# Patient Record
Sex: MALE | Race: WHITE | Employment: FULL TIME | URBAN - METROPOLITAN AREA
[De-identification: names, ages, dates, MRNs, and addresses within clinical notes are randomized per-mention and may not be internally consistent; named-entity substitution may affect disease eponyms.]

---

## 2017-03-15 ENCOUNTER — HOSPITAL ENCOUNTER (EMERGENCY)
Age: 57
Discharge: HOME | End: 2017-03-15
Attending: EMERGENCY MEDICINE
Payer: COMMERCIAL

## 2017-03-15 VITALS
HEIGHT: 67 IN | BODY MASS INDEX: 30.61 KG/M2 | TEMPERATURE: 98 F | SYSTOLIC BLOOD PRESSURE: 143 MMHG | OXYGEN SATURATION: 95 % | HEART RATE: 60 BPM | RESPIRATION RATE: 16 BRPM | WEIGHT: 195 LBS | DIASTOLIC BLOOD PRESSURE: 92 MMHG

## 2017-03-15 DIAGNOSIS — R25.1 SHAKINESS: Primary | ICD-10-CM

## 2017-03-15 LAB
BACTERIA URNS QL MICRO: 0 /HPF
CASTS URNS QL MICRO: 0 /LPF
EPI CELLS #/AREA URNS HPF: NORMAL /HPF
FLUAV AG NPH QL IA: NEGATIVE
FLUBV AG NPH QL IA: NEGATIVE
RBC #/AREA URNS HPF: NORMAL /HPF
WBC URNS QL MICRO: NORMAL /HPF

## 2017-03-15 PROCEDURE — 81003 URINALYSIS AUTO W/O SCOPE: CPT | Performed by: EMERGENCY MEDICINE

## 2017-03-15 PROCEDURE — 96374 THER/PROPH/DIAG INJ IV PUSH: CPT | Performed by: EMERGENCY MEDICINE

## 2017-03-15 PROCEDURE — 74011250636 HC RX REV CODE- 250/636: Performed by: EMERGENCY MEDICINE

## 2017-03-15 PROCEDURE — 99285 EMERGENCY DEPT VISIT HI MDM: CPT | Performed by: EMERGENCY MEDICINE

## 2017-03-15 PROCEDURE — 96361 HYDRATE IV INFUSION ADD-ON: CPT | Performed by: EMERGENCY MEDICINE

## 2017-03-15 PROCEDURE — 87804 INFLUENZA ASSAY W/OPTIC: CPT | Performed by: EMERGENCY MEDICINE

## 2017-03-15 PROCEDURE — 81015 MICROSCOPIC EXAM OF URINE: CPT | Performed by: EMERGENCY MEDICINE

## 2017-03-15 PROCEDURE — 96375 TX/PRO/DX INJ NEW DRUG ADDON: CPT | Performed by: EMERGENCY MEDICINE

## 2017-03-15 RX ORDER — PROCHLORPERAZINE EDISYLATE 5 MG/ML
5 INJECTION INTRAMUSCULAR; INTRAVENOUS
Status: COMPLETED | OUTPATIENT
Start: 2017-03-15 | End: 2017-03-15

## 2017-03-15 RX ORDER — DIPHENHYDRAMINE HYDROCHLORIDE 50 MG/ML
12.5 INJECTION, SOLUTION INTRAMUSCULAR; INTRAVENOUS
Status: COMPLETED | OUTPATIENT
Start: 2017-03-15 | End: 2017-03-15

## 2017-03-15 RX ADMIN — PROCHLORPERAZINE EDISYLATE 5 MG: 5 INJECTION INTRAMUSCULAR; INTRAVENOUS at 19:34

## 2017-03-15 RX ADMIN — DIPHENHYDRAMINE HYDROCHLORIDE 12.5 MG: 50 INJECTION, SOLUTION INTRAMUSCULAR; INTRAVENOUS at 19:34

## 2017-03-15 RX ADMIN — SODIUM CHLORIDE 1000 ML: 900 INJECTION, SOLUTION INTRAVENOUS at 19:33

## 2017-03-15 NOTE — LETTER
[address] [facility] EMERGENCY DEPT One [address] [address] 
[phone] Work/School Note Date: 3/15/2017 To Whom It May concern: 
 
Rogelio Roberts was seen and treated today in the emergency room by the following provider(s): 
Attending Provider: Dale Petty MD. Troyernestina Betancourtt Special Instructions: Work excuse today and tomorrow Sincerely, 
 
 
 
 
Dale Petty MD

## 2017-03-15 NOTE — ED NOTES
Patient awake, A&O x3. Patient states that since about 2pm today he has been feeling sick & very weak. Presently, patient complains of a burning sensation substernally/epigastric area. Patient does have history of reflux and took home medication with no relief. Patient also states he took himself off his amitriptyline and the \"sharp needle like pain\" on the right side of his chest has stopped in the last week. Patient denies nausea/vomiting. Patient states at points his entire body feels like he is being electrocuted. Also complains of general weakness. States he has been shaking uncontrollably off and on.

## 2017-03-15 NOTE — Clinical Note
Follow for fever or any worsening symptoms. Follow-up with Dr. Chas Reid or return to the emergency room with any worsening.  
Plenty of fluids

## 2017-03-15 NOTE — ED PROVIDER NOTES
HPI Comments: Here with nausea that began after lunch today. Drank Hole 19 AUBREY just in case low blood sugar. He has generalized body aches or associated with this. \"Feels like out of body\". Additionally during this time he has had a sense of some chills though no documented fever. Somewhere between any close contacts are sick. No lower GI symptoms. He last 3 weeks's had some mild urinary symptoms. He has a history of ureteral lithiasis but no obstructive uropathy symptoms at present. Has removed himself from long term amitriptyline use. No substance abuse history. Lots of personal stressors    Patient is a 49 y.o. male presenting with general illness. The history is provided by the patient and a parent. Generalized Body Aches   This is a new problem. The current episode started 6 to 12 hours ago. The problem occurs constantly. The problem has been gradually worsening. Pertinent negatives include no chest pain and no shortness of breath. Nothing aggravates the symptoms. Nothing relieves the symptoms. Past Medical History:   Diagnosis Date    Chest discomfort 11/18/2015    Tightness, pressure     Depression     Dyspnea 11/18/2015    Shortness of breath      GERD (gastroesophageal reflux disease)     controlled with meds     Hypoglycemia     Mixed hyperlipidemia 11/18/2015       Past Surgical History:   Procedure Laterality Date    COLONOSCOPY           Family History:   Problem Relation Age of Onset    Heart Attack Mother 66    High Cholesterol Father        Social History     Social History    Marital status: LEGALLY      Spouse name: N/A    Number of children: N/A    Years of education: N/A     Occupational History    Not on file.      Social History Main Topics    Smoking status: Never Smoker    Smokeless tobacco: Never Used    Alcohol use No    Drug use: No    Sexual activity: Not on file     Other Topics Concern    Not on file     Social History Narrative ALLERGIES: Iohexol and Penicillin g    Review of Systems   Constitutional: Negative for chills and fever. Respiratory: Negative. Negative for shortness of breath. Cardiovascular: Negative for chest pain. Gastrointestinal: Negative. Genitourinary: Negative. Musculoskeletal: Negative. Neurological: Negative. Hematological: Negative. Psychiatric/Behavioral: Positive for dysphoric mood. Negative for agitation, behavioral problems, confusion and decreased concentration. The patient is nervous/anxious. All other systems reviewed and are negative. Vitals:    03/15/17 1600 03/15/17 1733   BP: (!) 129/95    Pulse: 88    Resp: 18    Temp: 97.9 °F (36.6 °C)    SpO2: 98% 98%   Weight: 88.5 kg (195 lb)    Height: 5' 7\" (1.702 m)             Physical Exam   Constitutional: He appears well-developed and well-nourished. No distress. Not toxic or septic   HENT:   Head: Atraumatic. Nose: Nose normal. Right sinus exhibits no maxillary sinus tenderness and no frontal sinus tenderness. Left sinus exhibits no maxillary sinus tenderness and no frontal sinus tenderness. Mouth/Throat: Oropharynx is clear and moist. No oropharyngeal exudate. Eyes: No scleral icterus. Neck: Neck supple. Cardiovascular: Normal rate, regular rhythm and intact distal pulses. Pulmonary/Chest: Effort normal. No respiratory distress. He has no wheezes. Clear all fields   Abdominal: Soft. There is no tenderness. There is no rigidity, no rebound, no guarding and no CVA tenderness. Benign. No CVAT   Musculoskeletal: Normal range of motion. He exhibits no edema or tenderness. Lymphadenopathy:     He has no cervical adenopathy. Neurological: He is alert. He exhibits normal muscle tone. Coordination normal.   Skin: Skin is warm and dry. Psychiatric: His behavior is normal. Thought content normal. His mood appears anxious. His affect is not labile and not inappropriate.  His speech is not rapid and/or pressured. He is not agitated, not aggressive and not actively hallucinating. Nursing note and vitals reviewed. MDM  Number of Diagnoses or Management Options  Diagnosis management comments: No defined cause, could be infectious such as viral and possibly anxiety/stress and with this stopped a med. To follow closely for any changes.        Amount and/or Complexity of Data Reviewed  Clinical lab tests: ordered and reviewed  Decide to obtain previous medical records or to obtain history from someone other than the patient: yes  Obtain history from someone other than the patient: yes (father)    Risk of Complications, Morbidity, and/or Mortality  Presenting problems: moderate  Diagnostic procedures: low  Management options: moderate    Patient Progress  Patient progress: stable    ED Course       Procedures    Recent Results (from the past 12 hour(s))   INFLUENZA A & B AG (RAPID TEST)    Collection Time: 03/15/17  4:05 PM   Result Value Ref Range    Influenza A Ag NEGATIVE  NEG      Influenza B Ag NEGATIVE  NEG     URINE MICROSCOPIC    Collection Time: 03/15/17  7:45 PM   Result Value Ref Range    WBC 0-3 0 /hpf    RBC 0-3 0 /hpf    Epithelial cells 0-3 0 /hpf    Bacteria 0 0 /hpf    Casts 0 0 /lpf

## 2017-03-15 NOTE — ED TRIAGE NOTES
Per ems the pt has body ache and is under a lot of stress. Pt states he ate around 1400hrs today and started getting sick at his stomach. Pt self treated with otc meds. Pt denies vomiting. Pt states he cannot stop shaking and has pressure all over his entire body.

## 2017-04-21 PROBLEM — F41.9 ANXIETY DISORDER: Status: ACTIVE | Noted: 2017-04-21

## 2017-10-22 ENCOUNTER — HOSPITAL ENCOUNTER (EMERGENCY)
Age: 57
Discharge: HOME | End: 2017-10-22
Attending: EMERGENCY MEDICINE
Payer: COMMERCIAL

## 2017-10-22 VITALS
WEIGHT: 195 LBS | BODY MASS INDEX: 30.61 KG/M2 | HEIGHT: 67 IN | OXYGEN SATURATION: 100 % | DIASTOLIC BLOOD PRESSURE: 85 MMHG | SYSTOLIC BLOOD PRESSURE: 141 MMHG | TEMPERATURE: 98 F | HEART RATE: 72 BPM | RESPIRATION RATE: 16 BRPM

## 2017-10-22 DIAGNOSIS — K21.9 GASTROESOPHAGEAL REFLUX DISEASE, ESOPHAGITIS PRESENCE NOT SPECIFIED: Primary | ICD-10-CM

## 2017-10-22 DIAGNOSIS — F41.9 ACUTE ANXIETY: ICD-10-CM

## 2017-10-22 LAB
ALBUMIN SERPL-MCNC: 3.7 G/DL (ref 3.5–5)
ALBUMIN/GLOB SERPL: 1.1 {RATIO} (ref 1.2–3.5)
ALP SERPL-CCNC: 132 U/L (ref 50–136)
ALT SERPL-CCNC: 23 U/L (ref 12–65)
ANION GAP SERPL CALC-SCNC: 9 MMOL/L (ref 7–16)
AST SERPL-CCNC: 15 U/L (ref 15–37)
ATRIAL RATE: 78 BPM
BASOPHILS # BLD: 0 K/UL (ref 0–0.2)
BASOPHILS NFR BLD: 0 % (ref 0–2)
BILIRUB SERPL-MCNC: 0.4 MG/DL (ref 0.2–1.1)
BUN SERPL-MCNC: 15 MG/DL (ref 6–23)
CALCIUM SERPL-MCNC: 8.5 MG/DL (ref 8.3–10.4)
CALCULATED P AXIS, ECG09: 34 DEGREES
CALCULATED R AXIS, ECG10: 14 DEGREES
CALCULATED T AXIS, ECG11: 20 DEGREES
CHLORIDE SERPL-SCNC: 107 MMOL/L (ref 98–107)
CO2 SERPL-SCNC: 26 MMOL/L (ref 21–32)
CREAT SERPL-MCNC: 1.02 MG/DL (ref 0.8–1.5)
DIAGNOSIS, 93000: NORMAL
DIFFERENTIAL METHOD BLD: ABNORMAL
EOSINOPHIL # BLD: 0.1 K/UL (ref 0–0.8)
EOSINOPHIL NFR BLD: 1 % (ref 0.5–7.8)
ERYTHROCYTE [DISTWIDTH] IN BLOOD BY AUTOMATED COUNT: 12.3 % (ref 11.9–14.6)
GLOBULIN SER CALC-MCNC: 3.5 G/DL (ref 2.3–3.5)
GLUCOSE BLD STRIP.AUTO-MCNC: 83 MG/DL (ref 65–100)
GLUCOSE SERPL-MCNC: 105 MG/DL (ref 65–100)
HCT VFR BLD AUTO: 46 % (ref 41.1–50.3)
HGB BLD-MCNC: 16 G/DL (ref 13.6–17.2)
IMM GRANULOCYTES # BLD: 0 K/UL (ref 0–0.5)
IMM GRANULOCYTES NFR BLD: 0 % (ref 0–5)
LYMPHOCYTES # BLD: 1.5 K/UL (ref 0.5–4.6)
LYMPHOCYTES NFR BLD: 22 % (ref 13–44)
MCH RBC QN AUTO: 30.5 PG (ref 26.1–32.9)
MCHC RBC AUTO-ENTMCNC: 34.8 G/DL (ref 31.4–35)
MCV RBC AUTO: 87.8 FL (ref 79.6–97.8)
MONOCYTES # BLD: 0.3 K/UL (ref 0.1–1.3)
MONOCYTES NFR BLD: 5 % (ref 4–12)
NEUTS SEG # BLD: 5 K/UL (ref 1.7–8.2)
NEUTS SEG NFR BLD: 72 % (ref 43–78)
P-R INTERVAL, ECG05: 156 MS
PLATELET # BLD AUTO: 264 K/UL (ref 150–450)
PMV BLD AUTO: 10.3 FL (ref 10.8–14.1)
POTASSIUM SERPL-SCNC: 3.7 MMOL/L (ref 3.5–5.1)
PROT SERPL-MCNC: 7.2 G/DL (ref 6.3–8.2)
Q-T INTERVAL, ECG07: 368 MS
QRS DURATION, ECG06: 78 MS
QTC CALCULATION (BEZET), ECG08: 419 MS
RBC # BLD AUTO: 5.24 M/UL (ref 4.23–5.67)
SODIUM SERPL-SCNC: 142 MMOL/L (ref 136–145)
TROPONIN I SERPL-MCNC: <0.02 NG/ML (ref 0.02–0.05)
VENTRICULAR RATE, ECG03: 78 BPM
WBC # BLD AUTO: 6.9 K/UL (ref 4.3–11.1)

## 2017-10-22 PROCEDURE — 96374 THER/PROPH/DIAG INJ IV PUSH: CPT | Performed by: EMERGENCY MEDICINE

## 2017-10-22 PROCEDURE — 93005 ELECTROCARDIOGRAM TRACING: CPT | Performed by: EMERGENCY MEDICINE

## 2017-10-22 PROCEDURE — 81003 URINALYSIS AUTO W/O SCOPE: CPT | Performed by: EMERGENCY MEDICINE

## 2017-10-22 PROCEDURE — 99285 EMERGENCY DEPT VISIT HI MDM: CPT | Performed by: EMERGENCY MEDICINE

## 2017-10-22 PROCEDURE — 74011250637 HC RX REV CODE- 250/637: Performed by: EMERGENCY MEDICINE

## 2017-10-22 PROCEDURE — 82962 GLUCOSE BLOOD TEST: CPT

## 2017-10-22 PROCEDURE — 80053 COMPREHEN METABOLIC PANEL: CPT | Performed by: EMERGENCY MEDICINE

## 2017-10-22 PROCEDURE — 84484 ASSAY OF TROPONIN QUANT: CPT | Performed by: EMERGENCY MEDICINE

## 2017-10-22 PROCEDURE — 74011000250 HC RX REV CODE- 250: Performed by: EMERGENCY MEDICINE

## 2017-10-22 PROCEDURE — 85025 COMPLETE CBC W/AUTO DIFF WBC: CPT | Performed by: EMERGENCY MEDICINE

## 2017-10-22 PROCEDURE — 74011250636 HC RX REV CODE- 250/636: Performed by: EMERGENCY MEDICINE

## 2017-10-22 RX ORDER — LORAZEPAM 2 MG/ML
1 INJECTION INTRAMUSCULAR
Status: COMPLETED | OUTPATIENT
Start: 2017-10-22 | End: 2017-10-22

## 2017-10-22 RX ORDER — SODIUM CHLORIDE 0.9 % (FLUSH) 0.9 %
5-10 SYRINGE (ML) INJECTION EVERY 8 HOURS
Status: DISCONTINUED | OUTPATIENT
Start: 2017-10-22 | End: 2017-10-22 | Stop reason: HOSPADM

## 2017-10-22 RX ORDER — ZOLPIDEM TARTRATE 5 MG/1
5 TABLET ORAL
Qty: 12 TAB | Refills: 0 | Status: SHIPPED | OUTPATIENT
Start: 2017-10-22 | End: 2018-06-07 | Stop reason: SDUPTHER

## 2017-10-22 RX ORDER — LORAZEPAM 2 MG/ML
1 INJECTION INTRAMUSCULAR
Status: DISCONTINUED | OUTPATIENT
Start: 2017-10-22 | End: 2017-10-22

## 2017-10-22 RX ORDER — LIDOCAINE HYDROCHLORIDE 20 MG/ML
15 SOLUTION OROPHARYNGEAL
Status: COMPLETED | OUTPATIENT
Start: 2017-10-22 | End: 2017-10-22

## 2017-10-22 RX ORDER — METOCLOPRAMIDE 5 MG/1
5 TABLET ORAL
Qty: 40 TAB | Refills: 0 | Status: SHIPPED | OUTPATIENT
Start: 2017-10-22 | End: 2017-11-01

## 2017-10-22 RX ORDER — SODIUM CHLORIDE 0.9 % (FLUSH) 0.9 %
5-10 SYRINGE (ML) INJECTION AS NEEDED
Status: DISCONTINUED | OUTPATIENT
Start: 2017-10-22 | End: 2017-10-22 | Stop reason: HOSPADM

## 2017-10-22 RX ADMIN — Medication 30 ML: at 16:08

## 2017-10-22 RX ADMIN — LORAZEPAM 1 MG: 2 INJECTION INTRAMUSCULAR; INTRAVENOUS at 17:26

## 2017-10-22 RX ADMIN — LIDOCAINE HYDROCHLORIDE 15 ML: 20 SOLUTION ORAL; TOPICAL at 16:07

## 2017-10-22 NOTE — DISCHARGE INSTRUCTIONS

## 2017-10-22 NOTE — ED PROVIDER NOTES
HPI Comments: Patient complains of feeling lightheaded at [employer], got up to get some orange juice and started shaking all over, and started to tingle and face felt very flushed. EMS was called because he thought he was going to pass out. Patient states he has long-standing history of low blood sugar,, and that is very similar and felt like today. He also states he has some problems with anxiety and not sure if that was part of the whole episode. Patient is a 49 y.o. male presenting with fatigue. The history is provided by the patient and a parent. Fatigue   This is a new problem. The current episode started 3 to 5 hours ago. The problem has been gradually improving. There was no focality noted. Primary symptoms include loss of sensation (hands and face were a little numb). Pertinent negatives include no focal weakness, no loss of balance, no slurred speech, no speech difficulty, no memory loss, no movement disorder, no agitation, no visual change, no auditory change, no mental status change, no unresponsiveness and no disorientation. There has been no fever. Pertinent negatives include no shortness of breath, no chest pain, no vomiting, no altered mental status, no confusion, no headaches, no choking, no nausea, no bowel incontinence and no bladder incontinence. There were no medications administered prior to arrival. Associated medical issues do not include trauma, mood changes, bleeding disorder, seizures, dementia, CVA or clotting disorder.         Past Medical History:   Diagnosis Date    Chest discomfort 11/18/2015    Tightness, pressure     Depression     Dyspnea 11/18/2015    Shortness of breath      GERD (gastroesophageal reflux disease)     controlled with meds     Hypoglycemia     Mixed hyperlipidemia 11/18/2015       Past Surgical History:   Procedure Laterality Date    COLONOSCOPY           Family History:   Problem Relation Age of Onset    Heart Attack Mother 66    High Cholesterol Father Social History     Social History    Marital status: LEGALLY      Spouse name: N/A    Number of children: N/A    Years of education: N/A     Occupational History    Not on file. Social History Main Topics    Smoking status: Never Smoker    Smokeless tobacco: Never Used    Alcohol use No    Drug use: No    Sexual activity: Not on file     Other Topics Concern    Not on file     Social History Narrative         ALLERGIES: Iohexol and Penicillin g    Review of Systems   Constitutional: Positive for fatigue. Negative for appetite change and chills. Respiratory: Negative for choking and shortness of breath. Cardiovascular: Negative for chest pain, palpitations and leg swelling. Gastrointestinal: Positive for abdominal pain (eepigastric, pretty much daily, for which he takes Prilosec and an H2 blocker). Negative for bowel incontinence, nausea and vomiting. Genitourinary: Positive for difficulty urinating. Negative for bladder incontinence, penile pain and urgency. Neurological: Negative for focal weakness, speech difficulty, headaches and loss of balance. Psychiatric/Behavioral: Positive for sleep disturbance (chronic problems with sleeping). Negative for agitation, confusion, decreased concentration, dysphoric mood, memory loss and suicidal ideas. The patient is nervous/anxious. All other systems reviewed and are negative. Vitals:    10/22/17 1328   BP: 127/84   Pulse: 77   Resp: 16   Temp: 98 °F (36.7 °C)   SpO2: 96%   Weight: 88.5 kg (195 lb)   Height: 5' 7\" (1.702 m)            Physical Exam   Constitutional: He is oriented to person, place, and time. He appears well-developed and well-nourished. No distress. HENT:   Head: Normocephalic and atraumatic.    Right Ear: Tympanic membrane and external ear normal.   Left Ear: Tympanic membrane and external ear normal.   Mouth/Throat: Oropharynx is clear and moist.   Eyes: Conjunctivae and EOM are normal. Pupils are equal, round, and reactive to light. Neck: Normal range of motion. Neck supple. No tracheal deviation present. Cardiovascular: Normal rate, regular rhythm, normal heart sounds and intact distal pulses. Exam reveals no gallop and no friction rub. No murmur heard. Pulmonary/Chest: Effort normal and breath sounds normal. No respiratory distress. He has no wheezes. Abdominal: Soft. Bowel sounds are normal. He exhibits no shifting dullness, no distension, no pulsatile liver, no fluid wave, no abdominal bruit, no pulsatile midline mass and no mass. There is no hepatosplenomegaly. There is tenderness (mild) in the epigastric area. There is no rebound, no guarding and no CVA tenderness. No hernia. Musculoskeletal: Normal range of motion. He exhibits no edema. Lymphadenopathy:     He has no cervical adenopathy. Neurological: He is alert and oriented to person, place, and time. He displays normal reflexes. No cranial nerve deficit. Skin: Skin is warm and dry. No rash noted. He is not diaphoretic. No erythema. Psychiatric: His speech is normal and behavior is normal. His mood appears anxious. Thought content is not paranoid and not delusional. Cognition and memory are normal.   Nursing note and vitals reviewed.        MDM  Number of Diagnoses or Management Options  Acute anxiety: established and worsening  Gastroesophageal reflux disease, esophagitis presence not specified: new and requires workup     Amount and/or Complexity of Data Reviewed  Clinical lab tests: ordered and reviewed  Obtain history from someone other than the patient: yes  Review and summarize past medical records: yes  Independent visualization of images, tracings, or specimens: yes    Risk of Complications, Morbidity, and/or Mortality  Presenting problems: moderate  Diagnostic procedures: minimal  Management options: moderate    Patient Progress  Patient progress: improved    ED Course       Procedures    Results Reviewed:      Recent Results (from the past 24 hour(s))   EKG, 12 LEAD, INITIAL    Collection Time: 10/22/17  1:27 PM   Result Value Ref Range    Ventricular Rate 78 BPM    Atrial Rate 78 BPM    P-R Interval 156 ms    QRS Duration 78 ms    Q-T Interval 368 ms    QTC Calculation (Bezet) 419 ms    Calculated P Axis 34 degrees    Calculated R Axis 14 degrees    Calculated T Axis 20 degrees    Diagnosis       Normal sinus rhythm  Normal ECG  No previous ECGs available     CBC WITH AUTOMATED DIFF    Collection Time: 10/22/17  1:38 PM   Result Value Ref Range    WBC 6.9 4.3 - 11.1 K/uL    RBC 5.24 4.23 - 5.67 M/uL    HGB 16.0 13.6 - 17.2 g/dL    HCT 46.0 41.1 - 50.3 %    MCV 87.8 79.6 - 97.8 FL    MCH 30.5 26.1 - 32.9 PG    MCHC 34.8 31.4 - 35.0 g/dL    RDW 12.3 11.9 - 14.6 %    PLATELET 679 815 - 794 K/uL    MPV 10.3 (L) 10.8 - 14.1 FL    DF AUTOMATED      NEUTROPHILS 72 43 - 78 %    LYMPHOCYTES 22 13 - 44 %    MONOCYTES 5 4.0 - 12.0 %    EOSINOPHILS 1 0.5 - 7.8 %    BASOPHILS 0 0.0 - 2.0 %    IMMATURE GRANULOCYTES 0 0.0 - 5.0 %    ABS. NEUTROPHILS 5.0 1.7 - 8.2 K/UL    ABS. LYMPHOCYTES 1.5 0.5 - 4.6 K/UL    ABS. MONOCYTES 0.3 0.1 - 1.3 K/UL    ABS. EOSINOPHILS 0.1 0.0 - 0.8 K/UL    ABS. BASOPHILS 0.0 0.0 - 0.2 K/UL    ABS. IMM. GRANS. 0.0 0.0 - 0.5 K/UL   METABOLIC PANEL, COMPREHENSIVE    Collection Time: 10/22/17  1:38 PM   Result Value Ref Range    Sodium 142 136 - 145 mmol/L    Potassium 3.7 3.5 - 5.1 mmol/L    Chloride 107 98 - 107 mmol/L    CO2 26 21 - 32 mmol/L    Anion gap 9 7 - 16 mmol/L    Glucose 105 (H) 65 - 100 mg/dL    BUN 15 6 - 23 MG/DL    Creatinine 1.02 0.8 - 1.5 MG/DL    GFR est AA >60 >60 ml/min/1.73m2    GFR est non-AA >60 >60 ml/min/1.73m2    Calcium 8.5 8.3 - 10.4 MG/DL    Bilirubin, total 0.4 0.2 - 1.1 MG/DL    ALT (SGPT) 23 12 - 65 U/L    AST (SGOT) 15 15 - 37 U/L    Alk.  phosphatase 132 50 - 136 U/L    Protein, total 7.2 6.3 - 8.2 g/dL    Albumin 3.7 3.5 - 5.0 g/dL    Globulin 3.5 2.3 - 3.5 g/dL    A-G Ratio 1.1 (L) 1.2 - 3. 5     TROPONIN I    Collection Time: 10/22/17  1:38 PM   Result Value Ref Range    Troponin-I, Qt. <0.02 (L) 0.02 - 0.05 NG/ML       I discussed the results of all labs, procedures, radiographs, and treatments with the patient and available family. Treatment plan is agreed upon and the patient is ready for discharge. All voiced understanding of the discharge plan and medication instructions or changes as appropriate. Questions about treatment in the ED were answered. All were encouraged to return should symptoms worsen or new problems develop.

## 2017-10-22 NOTE — ED TRIAGE NOTES
Pt states that he thought that his blood sugar was low. Per EMS, it was 120's. He states that he drank OJ prior to EMS checking. States that he feels weak.

## 2018-04-26 ENCOUNTER — HOSPITAL ENCOUNTER (OUTPATIENT)
Dept: CT IMAGING | Age: 58
Discharge: HOME | End: 2018-04-26
Attending: INTERNAL MEDICINE
Payer: COMMERCIAL

## 2018-04-26 VITALS — BODY MASS INDEX: 29.82 KG/M2 | HEIGHT: 67 IN | WEIGHT: 190 LBS

## 2018-04-26 DIAGNOSIS — R07.89 CHEST DISCOMFORT: ICD-10-CM

## 2018-04-26 LAB — CREAT BLD-MCNC: 1.1 MG/DL (ref 0.8–1.5)

## 2018-04-26 PROCEDURE — 74011000258 HC RX REV CODE- 258: Performed by: INTERNAL MEDICINE

## 2018-04-26 PROCEDURE — 74011636320 HC RX REV CODE- 636/320: Performed by: INTERNAL MEDICINE

## 2018-04-26 PROCEDURE — 82565 ASSAY OF CREATININE: CPT

## 2018-04-26 PROCEDURE — 75574 CT ANGIO HRT W/3D IMAGE: CPT

## 2018-04-26 RX ORDER — SODIUM CHLORIDE 0.9 % (FLUSH) 0.9 %
10 SYRINGE (ML) INJECTION
Status: COMPLETED | OUTPATIENT
Start: 2018-04-26 | End: 2018-04-26

## 2018-04-26 RX ADMIN — SODIUM CHLORIDE 100 ML: 900 INJECTION, SOLUTION INTRAVENOUS at 07:54

## 2018-04-26 RX ADMIN — Medication 10 ML: at 07:54

## 2018-04-26 RX ADMIN — IOPAMIDOL 119 ML: 755 INJECTION, SOLUTION INTRAVENOUS at 07:54

## 2018-07-18 PROBLEM — K29.00 ACUTE GASTRITIS: Status: ACTIVE | Noted: 2018-07-18

## 2018-07-18 PROBLEM — K21.9 GASTROESOPHAGEAL REFLUX DISEASE WITHOUT ESOPHAGITIS: Status: ACTIVE | Noted: 2018-07-18

## 2018-07-18 PROBLEM — N20.0 KIDNEY STONES: Status: ACTIVE | Noted: 2017-11-17

## 2018-07-18 PROBLEM — K58.9 IBS (IRRITABLE BOWEL SYNDROME): Status: ACTIVE | Noted: 2018-07-18

## 2018-07-18 PROBLEM — R74.8 ELEVATED ALKALINE PHOSPHATASE LEVEL: Status: ACTIVE | Noted: 2018-07-18

## 2018-07-18 PROBLEM — E34.9 TESTOSTERONE DEFICIENCY: Status: ACTIVE | Noted: 2018-03-02

## 2018-07-18 PROBLEM — R93.2 ABNORMAL ULTRASOUND OF LIVER: Status: ACTIVE | Noted: 2018-07-18

## 2018-07-18 PROBLEM — K64.8 INTERNAL HEMORRHOIDS: Status: ACTIVE | Noted: 2018-07-18
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
## 2018-12-11 PROBLEM — M54.50 ACUTE LEFT-SIDED LOW BACK PAIN WITHOUT SCIATICA: Status: ACTIVE | Noted: 2018-12-11

## 2018-12-11 PROBLEM — S80.02XA CONTUSION OF LEFT KNEE: Status: ACTIVE | Noted: 2018-12-11

## 2019-01-21 ENCOUNTER — HOSPITAL ENCOUNTER (OUTPATIENT)
Dept: GENERAL RADIOLOGY | Age: 59
Discharge: HOME | End: 2019-01-21
Payer: COMMERCIAL

## 2019-01-21 DIAGNOSIS — K22.9 ESOPHAGEAL ABNORMALITY: ICD-10-CM

## 2019-01-21 DIAGNOSIS — R12 HEARTBURN: ICD-10-CM

## 2019-01-21 DIAGNOSIS — K58.2 IRRITABLE BOWEL SYNDROME WITH CONSTIPATION AND DIARRHEA: ICD-10-CM

## 2019-01-21 PROCEDURE — 74011000255 HC RX REV CODE- 255: Performed by: PHYSICIAN ASSISTANT

## 2019-01-21 PROCEDURE — 74220 X-RAY XM ESOPHAGUS 1CNTRST: CPT

## 2019-01-21 PROCEDURE — 74011000250 HC RX REV CODE- 250: Performed by: PHYSICIAN ASSISTANT

## 2019-01-21 RX ADMIN — ANTACID/ANTIFLATULENT 4 G: 380; 550; 10; 10 GRANULE, EFFERVESCENT ORAL at 09:03

## 2019-01-21 RX ADMIN — BARIUM SULFATE 135 ML: 980 POWDER, FOR SUSPENSION ORAL at 09:03

## 2019-01-21 RX ADMIN — BARIUM SULFATE 355 ML: 0.6 SUSPENSION ORAL at 09:06

## 2019-10-05 ENCOUNTER — APPOINTMENT (OUTPATIENT)
Dept: GENERAL RADIOLOGY | Age: 59
End: 2019-10-05
Attending: EMERGENCY MEDICINE
Payer: COMMERCIAL

## 2019-10-05 ENCOUNTER — HOSPITAL ENCOUNTER (EMERGENCY)
Age: 59
Discharge: HOME | End: 2019-10-05
Attending: EMERGENCY MEDICINE
Payer: COMMERCIAL

## 2019-10-05 VITALS
BODY MASS INDEX: 29.03 KG/M2 | DIASTOLIC BLOOD PRESSURE: 84 MMHG | SYSTOLIC BLOOD PRESSURE: 132 MMHG | HEART RATE: 65 BPM | TEMPERATURE: 97.9 F | HEIGHT: 67 IN | WEIGHT: 185 LBS | OXYGEN SATURATION: 95 % | RESPIRATION RATE: 16 BRPM

## 2019-10-05 DIAGNOSIS — G89.29 CHRONIC RIGHT SHOULDER PAIN: Primary | ICD-10-CM

## 2019-10-05 DIAGNOSIS — R07.9 CHEST PAIN, UNSPECIFIED TYPE: ICD-10-CM

## 2019-10-05 DIAGNOSIS — M25.511 CHRONIC RIGHT SHOULDER PAIN: Primary | ICD-10-CM

## 2019-10-05 LAB
ALBUMIN SERPL-MCNC: 3.8 G/DL (ref 3.5–5)
ALBUMIN/GLOB SERPL: 1.2 {RATIO} (ref 1.2–3.5)
ALP SERPL-CCNC: 138 U/L (ref 50–136)
ALT SERPL-CCNC: 29 U/L (ref 12–65)
ANION GAP SERPL CALC-SCNC: 4 MMOL/L (ref 7–16)
AST SERPL-CCNC: 14 U/L (ref 15–37)
ATRIAL RATE: 65 BPM
BASOPHILS # BLD: 0 K/UL (ref 0–0.2)
BASOPHILS NFR BLD: 0 % (ref 0–2)
BILIRUB SERPL-MCNC: 0.6 MG/DL (ref 0.2–1.1)
BUN SERPL-MCNC: 16 MG/DL (ref 6–23)
CALCIUM SERPL-MCNC: 8.6 MG/DL (ref 8.3–10.4)
CALCULATED P AXIS, ECG09: 75 DEGREES
CALCULATED R AXIS, ECG10: 28 DEGREES
CALCULATED T AXIS, ECG11: 38 DEGREES
CHLORIDE SERPL-SCNC: 110 MMOL/L (ref 98–107)
CO2 SERPL-SCNC: 28 MMOL/L (ref 21–32)
CREAT SERPL-MCNC: 0.98 MG/DL (ref 0.8–1.5)
DIAGNOSIS, 93000: NORMAL
DIFFERENTIAL METHOD BLD: NORMAL
EOSINOPHIL # BLD: 0 K/UL (ref 0–0.8)
EOSINOPHIL NFR BLD: 1 % (ref 0.5–7.8)
ERYTHROCYTE [DISTWIDTH] IN BLOOD BY AUTOMATED COUNT: 11.9 % (ref 11.9–14.6)
GLOBULIN SER CALC-MCNC: 3.2 G/DL (ref 2.3–3.5)
GLUCOSE SERPL-MCNC: 91 MG/DL (ref 65–100)
HCT VFR BLD AUTO: 47.2 % (ref 41.1–50.3)
HGB BLD-MCNC: 15.8 G/DL (ref 13.6–17.2)
IMM GRANULOCYTES # BLD AUTO: 0 K/UL (ref 0–0.5)
IMM GRANULOCYTES NFR BLD AUTO: 1 % (ref 0–5)
LYMPHOCYTES # BLD: 1.4 K/UL (ref 0.5–4.6)
LYMPHOCYTES NFR BLD: 22 % (ref 13–44)
MCH RBC QN AUTO: 30.3 PG (ref 26.1–32.9)
MCHC RBC AUTO-ENTMCNC: 33.5 G/DL (ref 31.4–35)
MCV RBC AUTO: 90.4 FL (ref 79.6–97.8)
MONOCYTES # BLD: 0.5 K/UL (ref 0.1–1.3)
MONOCYTES NFR BLD: 8 % (ref 4–12)
NEUTS SEG # BLD: 4.2 K/UL (ref 1.7–8.2)
NEUTS SEG NFR BLD: 69 % (ref 43–78)
NRBC # BLD: 0 K/UL (ref 0–0.2)
P-R INTERVAL, ECG05: 156 MS
PLATELET # BLD AUTO: 248 K/UL (ref 150–450)
PMV BLD AUTO: 9.9 FL (ref 9.4–12.3)
POTASSIUM SERPL-SCNC: 4 MMOL/L (ref 3.5–5.1)
PROT SERPL-MCNC: 7 G/DL (ref 6.3–8.2)
Q-T INTERVAL, ECG07: 386 MS
QRS DURATION, ECG06: 76 MS
QTC CALCULATION (BEZET), ECG08: 401 MS
RBC # BLD AUTO: 5.22 M/UL (ref 4.23–5.6)
SODIUM SERPL-SCNC: 142 MMOL/L (ref 136–145)
TROPONIN I SERPL-MCNC: <0.02 NG/ML (ref 0.02–0.05)
TROPONIN I SERPL-MCNC: <0.02 NG/ML (ref 0.02–0.05)
VENTRICULAR RATE, ECG03: 65 BPM
WBC # BLD AUTO: 6.2 K/UL (ref 4.3–11.1)

## 2019-10-05 PROCEDURE — 96374 THER/PROPH/DIAG INJ IV PUSH: CPT | Performed by: EMERGENCY MEDICINE

## 2019-10-05 PROCEDURE — 80053 COMPREHEN METABOLIC PANEL: CPT

## 2019-10-05 PROCEDURE — 71046 X-RAY EXAM CHEST 2 VIEWS: CPT

## 2019-10-05 PROCEDURE — 85025 COMPLETE CBC W/AUTO DIFF WBC: CPT

## 2019-10-05 PROCEDURE — 74011250636 HC RX REV CODE- 250/636: Performed by: EMERGENCY MEDICINE

## 2019-10-05 PROCEDURE — 74011250637 HC RX REV CODE- 250/637: Performed by: EMERGENCY MEDICINE

## 2019-10-05 PROCEDURE — 73030 X-RAY EXAM OF SHOULDER: CPT

## 2019-10-05 PROCEDURE — 84484 ASSAY OF TROPONIN QUANT: CPT

## 2019-10-05 PROCEDURE — 99285 EMERGENCY DEPT VISIT HI MDM: CPT | Performed by: EMERGENCY MEDICINE

## 2019-10-05 PROCEDURE — 96375 TX/PRO/DX INJ NEW DRUG ADDON: CPT | Performed by: EMERGENCY MEDICINE

## 2019-10-05 PROCEDURE — 93005 ELECTROCARDIOGRAM TRACING: CPT | Performed by: EMERGENCY MEDICINE

## 2019-10-05 RX ORDER — KETOROLAC TROMETHAMINE 30 MG/ML
15 INJECTION, SOLUTION INTRAMUSCULAR; INTRAVENOUS
Status: COMPLETED | OUTPATIENT
Start: 2019-10-05 | End: 2019-10-05

## 2019-10-05 RX ORDER — GUAIFENESIN 100 MG/5ML
324 LIQUID (ML) ORAL
Status: COMPLETED | OUTPATIENT
Start: 2019-10-05 | End: 2019-10-05

## 2019-10-05 RX ADMIN — KETOROLAC TROMETHAMINE 15 MG: 30 INJECTION, SOLUTION INTRAMUSCULAR at 11:38

## 2019-10-05 RX ADMIN — ASPIRIN 81 MG 324 MG: 81 TABLET ORAL at 11:38

## 2019-10-05 NOTE — LETTER
[address] [address] EMERGENCY DEPT 
[address] [place] [address] [phone] Work/School Note Date: 10/5/2019 To Whom It May concern: 
 
Sandeep Peters was seen and treated today in the emergency room by the following provider(s): 
No providers found.    
 
Sandeep Peters may return to work on 10/7/19 but only on light duty until seen by Orthopedic MD. 
 
Sincerely, 
 
 
 
 
Maricruz Mark RN

## 2019-10-05 NOTE — ED NOTES
I have reviewed discharge instructions with the patient. The patient verbalized understanding. Patient left ED via Discharge Method: ambulatory to Home with self. Opportunity for questions and clarification provided. Patient given 0 scripts. To continue your aftercare when you leave the hospital, you may receive an automated call from our care team to check in on how you are doing. This is a free service and part of our promise to provide the best care and service to meet your aftercare needs.  If you have questions, or wish to unsubscribe from this service please call [phone]. Thank you for Choosing our [facility] Emergency Department.

## 2019-10-05 NOTE — ED PROVIDER NOTES
Patient is a 67 yo male who presents with right shoulder pain and chest pain. 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 No SOB, no nausea or vomiting, no cough or fevers, no further complaints. Overall patient is very well appearing and in NAD.            Past Medical History:   Diagnosis Date    Chest discomfort 11/18/2015    Tightness, pressure     Depression     Dyspnea 11/18/2015    Shortness of breath      GERD (gastroesophageal reflux disease)     controlled with meds     Hypoglycemia     Mixed hyperlipidemia 11/18/2015       Past Surgical History:   Procedure Laterality Date    COLONOSCOPY           Family History:   Problem Relation Age of Onset    Heart Attack Mother 66    High Cholesterol Father        Social History     Socioeconomic History    Marital status:      Spouse name: Not on file    Number of children: Not on file    Years of education: Not on file    Highest education level: Not on file   Occupational History    Not on file   Social Needs    Financial resource strain: Not on file    Food insecurity:     Worry: Not on file     Inability: Not on file    Transportation needs:     Medical: Not on file     Non-medical: Not on file   Tobacco Use    Smoking status: Never Smoker    Smokeless tobacco: Never Used   Substance and Sexual Activity    Alcohol use: No    Drug use: No    Sexual activity: Not on file   Lifestyle    Physical activity:     Days per week: Not on file     Minutes per session: Not on file    Stress: Not on file   Relationships    Social connections:     Talks on phone: Not on file     Gets together: Not on file     Attends Rastafari service: Not on file     Active member of club or organization: Not on file     Attends meetings of clubs or organizations: Not on file     Relationship status: Not on file    Intimate partner violence:     Fear of current or ex partner: Not on file     Emotionally abused: Not on file     Physically abused: Not on file     Forced sexual activity: Not on file   Other Topics Concern    Not on file   Social History Narrative    Not on file         ALLERGIES: Iohexol and Penicillin g    Review of Systems   Constitutional: Negative for chills and fever. HENT: Negative for rhinorrhea and sore throat. Eyes: Negative for visual disturbance. Respiratory: Negative for cough and shortness of breath. Cardiovascular: Positive for chest pain. Negative for leg swelling. Gastrointestinal: Negative for abdominal pain, diarrhea, nausea and vomiting. Genitourinary: Negative for dysuria. Musculoskeletal: Positive for arthralgias. Negative for back pain and neck pain. Skin: Negative for rash. Neurological: Negative for weakness and headaches. Psychiatric/Behavioral: The patient is not nervous/anxious. Vitals:    10/05/19 1027   BP: 120/82   Pulse: 72   Resp: 16   Temp: 97.9 °F (36.6 °C)   SpO2: 95%   Weight: 83.9 kg (185 lb)   Height: 5' 7\" (1.702 m)            Physical Exam   Constitutional: He is oriented to person, place, and time. He appears well-developed and well-nourished. HENT:   Head: Normocephalic. Right Ear: External ear normal.   Left Ear: External ear normal.   Eyes: Pupils are equal, round, and reactive to light. Conjunctivae and EOM are normal.   Neck: Normal range of motion. Neck supple. No tracheal deviation present. Cardiovascular: Normal rate, regular rhythm, normal heart sounds and intact distal pulses. No murmur heard. Pulmonary/Chest: Effort normal and breath sounds normal. No respiratory distress. Abdominal: Soft. There is no tenderness. Musculoskeletal: Normal range of motion.  He exhibits tenderness (to palpation along right deltoid and pectoralis muscles and with ROM of right shoulder. Full ROM intact, radial pulses 2+ bilaterally. ). Neurological: He is alert and oriented to person, place, and time. No cranial nerve deficit. Skin: No rash noted. Nursing note and vitals reviewed. MDM  Number of Diagnoses or Management Options     Amount and/or Complexity of Data Reviewed  Clinical lab tests: ordered and reviewed  Tests in the radiology section of CPT®: ordered and reviewed  Tests in the medicine section of CPT®: ordered and reviewed  Review and summarize past medical records: yes    Risk of Complications, Morbidity, and/or Mortality  Presenting problems: high  Diagnostic procedures: high  Management options: high    Patient Progress  Patient progress: stable         Procedures  Recent Results (from the past 12 hour(s))   EKG, 12 LEAD, INITIAL    Collection Time: 10/05/19 10:33 AM   Result Value Ref Range    Ventricular Rate 65 BPM    Atrial Rate 65 BPM    P-R Interval 156 ms    QRS Duration 76 ms    Q-T Interval 386 ms    QTC Calculation (Bezet) 401 ms    Calculated P Axis 75 degrees    Calculated R Axis 28 degrees    Calculated T Axis 38 degrees    Diagnosis       !! AGE AND GENDER SPECIFIC ECG ANALYSIS !!   Normal sinus rhythm  Normal ECG  When compared with ECG of 22-OCT-2017 13:27,  Nonspecific T wave abnormality no longer evident in Anterior leads     CBC WITH AUTOMATED DIFF    Collection Time: 10/05/19 10:37 AM   Result Value Ref Range    WBC 6.2 4.3 - 11.1 K/uL    RBC 5.22 4.23 - 5.6 M/uL    HGB 15.8 13.6 - 17.2 g/dL    HCT 47.2 41.1 - 50.3 %    MCV 90.4 79.6 - 97.8 FL    MCH 30.3 26.1 - 32.9 PG    MCHC 33.5 31.4 - 35.0 g/dL    RDW 11.9 11.9 - 14.6 %    PLATELET 888 262 - 381 K/uL    MPV 9.9 9.4 - 12.3 FL    ABSOLUTE NRBC 0.00 0.0 - 0.2 K/uL    DF AUTOMATED      NEUTROPHILS 69 43 - 78 %    LYMPHOCYTES 22 13 - 44 %    MONOCYTES 8 4.0 - 12.0 %    EOSINOPHILS 1 0.5 - 7.8 %    BASOPHILS 0 0.0 - 2.0 % IMMATURE GRANULOCYTES 1 0.0 - 5.0 %    ABS. NEUTROPHILS 4.2 1.7 - 8.2 K/UL    ABS. LYMPHOCYTES 1.4 0.5 - 4.6 K/UL    ABS. MONOCYTES 0.5 0.1 - 1.3 K/UL    ABS. EOSINOPHILS 0.0 0.0 - 0.8 K/UL    ABS. BASOPHILS 0.0 0.0 - 0.2 K/UL    ABS. IMM. GRANS. 0.0 0.0 - 0.5 K/UL   METABOLIC PANEL, COMPREHENSIVE    Collection Time: 10/05/19 10:37 AM   Result Value Ref Range    Sodium 142 136 - 145 mmol/L    Potassium 4.0 3.5 - 5.1 mmol/L    Chloride 110 (H) 98 - 107 mmol/L    CO2 28 21 - 32 mmol/L    Anion gap 4 (L) 7 - 16 mmol/L    Glucose 91 65 - 100 mg/dL    BUN 16 6 - 23 MG/DL    Creatinine 0.98 0.8 - 1.5 MG/DL    GFR est AA >60 >60 ml/min/1.73m2    GFR est non-AA >60 >60 ml/min/1.73m2    Calcium 8.6 8.3 - 10.4 MG/DL    Bilirubin, total 0.6 0.2 - 1.1 MG/DL    ALT (SGPT) 29 12 - 65 U/L    AST (SGOT) 14 (L) 15 - 37 U/L    Alk. phosphatase 138 (H) 50 - 136 U/L    Protein, total 7.0 6.3 - 8.2 g/dL    Albumin 3.8 3.5 - 5.0 g/dL    Globulin 3.2 2.3 - 3.5 g/dL    A-G Ratio 1.2 1.2 - 3.5     TROPONIN I    Collection Time: 10/05/19 10:37 AM   Result Value Ref Range    Troponin-I, Qt. <0.02 (L) 0.02 - 0.05 NG/ML   TROPONIN I    Collection Time: 10/05/19  1:48 PM   Result Value Ref Range    Troponin-I, Qt. <0.02 (L) 0.02 - 0.05 NG/ML     Xr Chest Pa Lat    Result Date: 10/5/2019  Chest X-ray INDICATION: Right chest pain PA and lateral views of the chest were obtained. FINDINGS: The lungs are clear. There are no infiltrates or effusions. The heart size is normal.  The bony thorax is intact. IMPRESSION: No acute findings in the chest     Xr Shoulder Rt Ap/lat Min 2 V    Result Date: 10/5/2019  Right Shoulder INDICATION: Shoulder pain Three views of the right shoulder were obtained FINDINGS: There is no evidence of fracture or dislocation. No bony lesions are seen in the proximal right humerus or adjacent scapula.      IMPRESSION: Negative right shoulder     EKG:NSR without acute ischemic chagnes    65 yo male with chest pain and shoulder pain:    Patient is VERY well appearing, pain improved and pain seems very much musculoskeletal in nature as it is painful to palpation of deltoid/trapezius and pectoralis along anterior chest well and his HEART score is 1 for age only so given EKG and negative 3 hr delta troponin very low suspicion ACS. No SOB and EKG is NSR without evidence of right hear strain or any further concerns to suspect PE on history or physical including no swelling of legs so very low suspicion of PE or further acute intrathoracic process. Patient placed in sling for right shoulder and discussed ibuprofen/tylenol and also need for follow up with ortho for chronic shoulder pain and also cardiology for pain in chest which is also chronic for the past year per patient. Patient agrees to return with any worsening or change in pain or any further concerns.

## 2019-10-05 NOTE — ED TRIAGE NOTES
Pt to ED via EMS c/o right shoulder pain that was made worse after PT yesterday. States aching pain. Pt sent to PT for frozen shoulder by Dr. Meliton Nascimento. Pt now stating that he has chest pain radiating up to right jaw.

## 2021-02-16 LAB — SARS-COV-2, NAA: DETECTED

## 2021-02-18 ENCOUNTER — APPOINTMENT (OUTPATIENT)
Dept: GENERAL RADIOLOGY | Age: 61
End: 2021-02-18
Attending: EMERGENCY MEDICINE
Payer: COMMERCIAL

## 2021-02-18 ENCOUNTER — HOSPITAL ENCOUNTER (EMERGENCY)
Age: 61
Discharge: HOME | End: 2021-02-18
Attending: EMERGENCY MEDICINE
Payer: COMMERCIAL

## 2021-02-18 VITALS
BODY MASS INDEX: 28.04 KG/M2 | HEIGHT: 68 IN | HEART RATE: 90 BPM | OXYGEN SATURATION: 95 % | SYSTOLIC BLOOD PRESSURE: 126 MMHG | DIASTOLIC BLOOD PRESSURE: 79 MMHG | TEMPERATURE: 98.3 F | WEIGHT: 185 LBS | RESPIRATION RATE: 18 BRPM

## 2021-02-18 DIAGNOSIS — U07.1 COVID-19: Primary | ICD-10-CM

## 2021-02-18 LAB
ALBUMIN SERPL-MCNC: 3.5 G/DL (ref 3.5–5)
ALBUMIN/GLOB SERPL: 0.9 {RATIO} (ref 1.2–3.5)
ALP SERPL-CCNC: 120 U/L (ref 50–136)
ALT SERPL-CCNC: 21 U/L (ref 12–65)
ANION GAP SERPL CALC-SCNC: 7 MMOL/L (ref 7–16)
AST SERPL-CCNC: 23 U/L (ref 15–37)
ATRIAL RATE: 98 BPM
BASOPHILS # BLD: 0 K/UL (ref 0–0.2)
BASOPHILS NFR BLD: 0 % (ref 0–2)
BILIRUB SERPL-MCNC: 0.4 MG/DL (ref 0.2–1.1)
BUN SERPL-MCNC: 13 MG/DL (ref 6–23)
CALCIUM SERPL-MCNC: 8.4 MG/DL (ref 8.3–10.4)
CALCULATED P AXIS, ECG09: 48 DEGREES
CALCULATED R AXIS, ECG10: 34 DEGREES
CALCULATED T AXIS, ECG11: 22 DEGREES
CHLORIDE SERPL-SCNC: 101 MMOL/L (ref 98–107)
CO2 SERPL-SCNC: 28 MMOL/L (ref 21–32)
CREAT SERPL-MCNC: 1.11 MG/DL (ref 0.8–1.5)
DIAGNOSIS, 93000: NORMAL
DIFFERENTIAL METHOD BLD: ABNORMAL
EOSINOPHIL # BLD: 0 K/UL (ref 0–0.8)
EOSINOPHIL NFR BLD: 0 % (ref 0.5–7.8)
ERYTHROCYTE [DISTWIDTH] IN BLOOD BY AUTOMATED COUNT: 12.1 % (ref 11.9–14.6)
GLOBULIN SER CALC-MCNC: 3.8 G/DL (ref 2.3–3.5)
GLUCOSE SERPL-MCNC: 89 MG/DL (ref 65–100)
HCT VFR BLD AUTO: 47.9 % (ref 41.1–50.3)
HGB BLD-MCNC: 16.1 G/DL (ref 13.6–17.2)
IMM GRANULOCYTES # BLD AUTO: 0 K/UL (ref 0–0.5)
IMM GRANULOCYTES NFR BLD AUTO: 0 % (ref 0–5)
LYMPHOCYTES # BLD: 1 K/UL (ref 0.5–4.6)
LYMPHOCYTES NFR BLD: 22 % (ref 13–44)
MCH RBC QN AUTO: 29.9 PG (ref 26.1–32.9)
MCHC RBC AUTO-ENTMCNC: 33.6 G/DL (ref 31.4–35)
MCV RBC AUTO: 88.9 FL (ref 79.6–97.8)
MONOCYTES # BLD: 0.2 K/UL (ref 0.1–1.3)
MONOCYTES NFR BLD: 5 % (ref 4–12)
NEUTS SEG # BLD: 3.4 K/UL (ref 1.7–8.2)
NEUTS SEG NFR BLD: 73 % (ref 43–78)
NRBC # BLD: 0 K/UL (ref 0–0.2)
P-R INTERVAL, ECG05: 148 MS
PLATELET # BLD AUTO: 174 K/UL (ref 150–450)
PMV BLD AUTO: 9.8 FL (ref 9.4–12.3)
POTASSIUM SERPL-SCNC: 3.3 MMOL/L (ref 3.5–5.1)
PROT SERPL-MCNC: 7.3 G/DL (ref 6.3–8.2)
Q-T INTERVAL, ECG07: 328 MS
QRS DURATION, ECG06: 74 MS
QTC CALCULATION (BEZET), ECG08: 418 MS
RBC # BLD AUTO: 5.39 M/UL (ref 4.23–5.6)
SODIUM SERPL-SCNC: 136 MMOL/L (ref 136–145)
TROPONIN-HIGH SENSITIVITY: 7.8 PG/ML (ref 0–14)
VENTRICULAR RATE, ECG03: 98 BPM
WBC # BLD AUTO: 4.6 K/UL (ref 4.3–11.1)

## 2021-02-18 PROCEDURE — 74011250636 HC RX REV CODE- 250/636: Performed by: EMERGENCY MEDICINE

## 2021-02-18 PROCEDURE — 80053 COMPREHEN METABOLIC PANEL: CPT

## 2021-02-18 PROCEDURE — 71045 X-RAY EXAM CHEST 1 VIEW: CPT

## 2021-02-18 PROCEDURE — 85025 COMPLETE CBC W/AUTO DIFF WBC: CPT

## 2021-02-18 PROCEDURE — 93005 ELECTROCARDIOGRAM TRACING: CPT | Performed by: EMERGENCY MEDICINE

## 2021-02-18 PROCEDURE — 99284 EMERGENCY DEPT VISIT MOD MDM: CPT

## 2021-02-18 PROCEDURE — 84484 ASSAY OF TROPONIN QUANT: CPT

## 2021-02-18 PROCEDURE — 74011250637 HC RX REV CODE- 250/637: Performed by: EMERGENCY MEDICINE

## 2021-02-18 PROCEDURE — 96360 HYDRATION IV INFUSION INIT: CPT

## 2021-02-18 RX ORDER — BENZONATATE 100 MG/1
100 CAPSULE ORAL
Status: COMPLETED | OUTPATIENT
Start: 2021-02-18 | End: 2021-02-18

## 2021-02-18 RX ORDER — BENZONATATE 100 MG/1
100 CAPSULE ORAL
Qty: 30 CAP | Refills: 0 | Status: SHIPPED | OUTPATIENT
Start: 2021-02-18 | End: 2021-03-12

## 2021-02-18 RX ADMIN — SODIUM CHLORIDE 1000 ML: 900 INJECTION, SOLUTION INTRAVENOUS at 21:14

## 2021-02-18 RX ADMIN — BENZONATATE 100 MG: 100 CAPSULE ORAL at 21:14

## 2021-02-19 NOTE — ED TRIAGE NOTES
Pt walks to triage in no distress mask in place. Pt states he was diagnosed with covid on 2/15. States 2 days ago started having chest px, SOB, cough, and abdominal px. States he had a fever today of 101.4 at 4 pm took 2 tylenol and it went down. Denies vomiting but states some nausea. States he feels dizzy when he stands up. Denies any cardiac hx or respiratory hx. States he has had chest px in the past with negative stress test. Denies blood thinners. Denies diabetes.

## 2021-02-19 NOTE — ED PROVIDER NOTES
60-year-old male presents with generalized weakness, cough, shortness of breath, chest pain, and nausea.  He was diagnosed with Covid February 15.  He started feeling sick February 14.  He states his entire family is sick.  Denies history of pulmonary disease.  Not on home oxygen.  He reports poor appetite.  No vomiting. Fever 101.4 today. Took tylenol.      Positive For Covid-19  Associated symptoms: chest pain, congestion, cough, myalgias and nausea    Associated symptoms: no confusion, no rash and no vomiting         Past Medical History:   Diagnosis Date   • Chest discomfort 11/18/2015    Tightness, pressure    • Chronic left maxillary sinusitis    • Depression    • Dyspnea 11/18/2015    Shortness of breath     • GERD (gastroesophageal reflux disease)     controlled with meds    • Hypoglycemia    • Mixed hyperlipidemia 11/18/2015       Past Surgical History:   Procedure Laterality Date   • COLONOSCOPY     • HX HEENT Left 12/10/2020    FESS w/ L max antrostomy/ant ethmoidectomy- Sathya         Family History:   Problem Relation Age of Onset   • Heart Attack Mother 78   • High Cholesterol Father        Social History     Socioeconomic History   • Marital status:      Spouse name: Not on file   • Number of children: Not on file   • Years of education: Not on file   • Highest education level: Not on file   Occupational History   • Not on file   Social Needs   • Financial resource strain: Not on file   • Food insecurity     Worry: Not on file     Inability: Not on file   • Transportation needs     Medical: Not on file     Non-medical: Not on file   Tobacco Use   • Smoking status: Never Smoker   • Smokeless tobacco: Never Used   Substance and Sexual Activity   • Alcohol use: No   • Drug use: No   • Sexual activity: Not on file   Lifestyle   • Physical activity     Days per week: Not on file     Minutes per session: Not on file   • Stress: Not on file   Relationships   • Social connections     Talks on phone:  Not on file     Gets together: Not on file     Attends Islam service: Not on file     Active member of club or organization: Not on file     Attends meetings of clubs or organizations: Not on file     Relationship status: Not on file    Intimate partner violence     Fear of current or ex partner: Not on file     Emotionally abused: Not on file     Physically abused: Not on file     Forced sexual activity: Not on file   Other Topics Concern    Not on file   Social History Narrative    Not on file         ALLERGIES: Iohexol and Penicillin g    Review of Systems   Constitutional: Positive for activity change, appetite change, fatigue and fever. HENT: Positive for congestion. Eyes: Negative for visual disturbance. Respiratory: Positive for cough and shortness of breath. Cardiovascular: Positive for chest pain. Gastrointestinal: Positive for abdominal pain and nausea. Negative for vomiting. Musculoskeletal: Positive for myalgias. Skin: Negative for rash. Neurological: Positive for light-headedness. Psychiatric/Behavioral: Negative for confusion. Vitals:    02/18/21 1911 02/18/21 2014 02/18/21 2015 02/18/21 2019   BP: 118/76  111/73 105/73   Pulse: 100  93 90   Resp: 18      Temp: 98.3 °F (36.8 °C)      SpO2: 98% 97% 99% 96%   Weight: 83.9 kg (185 lb)      Height: 5' 8\" (1.727 m)               Physical Exam  Vitals signs and nursing note reviewed. Constitutional:       Appearance: He is well-developed. HENT:      Head: Normocephalic and atraumatic. Eyes:      Pupils: Pupils are equal, round, and reactive to light. Neck:      Musculoskeletal: Normal range of motion and neck supple. Cardiovascular:      Rate and Rhythm: Regular rhythm. Heart sounds: Normal heart sounds. Pulmonary:      Effort: Pulmonary effort is normal.      Breath sounds: Normal breath sounds. Abdominal:      Palpations: Abdomen is soft. Tenderness: There is no abdominal tenderness. Musculoskeletal: Normal range of motion. Skin:     General: Skin is warm and dry. Neurological:      Mental Status: He is alert. Psychiatric:         Behavior: Behavior normal.          MDM  Number of Diagnoses or Management Options  Diagnosis management comments: Parts of this document were created using dragon voice recognition software. The chart has been reviewed but errors may still be present. I wore appropriate PPE throughout this patient's ED visit. Zoe Peterson MD, 8:47 PM    9:51 PM  Sats remain above 90% with ambulation. Given fluids. No indication for admission. I discussed the results of all labs, procedures, radiographs, and treatments with the patient and available family. Treatment plan is agreed upon and the patient is ready for discharge. Questions about treatment in the ED and differential diagnosis of presenting condition were answered. Patient was given verbal discharge instructions including, but not limited to, importance of returning to the emergency department for any concern of worsening or continued symptoms. Instructions were given to follow up with a primary care provider or specialist within 1-2 days. Adverse effects of medications, if prescribed, were discussed and patient was advised to refrain from significant physical activity until followed up by primary care physician and to not drive or operate heavy machinery after taking any sedating substances.            Amount and/or Complexity of Data Reviewed  Clinical lab tests: reviewed and ordered (Results for orders placed or performed during the hospital encounter of 02/18/21  -CBC WITH AUTOMATED DIFF       Result                      Value             Ref Range           WBC                         4.6               4.3 - 11.1 K*       RBC                         5.39              4.23 - 5.6 M*       HGB                         16.1              13.6 - 17.2 *       HCT                         47.9 41.1 - 50.3 %       MCV                         88.9              79.6 - 97.8 *       MCH                         29.9              26.1 - 32.9 *       MCHC                        33.6              31.4 - 35.0 *       RDW                         12.1              11.9 - 14.6 %       PLATELET                    174               150 - 450 K/*       MPV                         9.8               9.4 - 12.3 FL       ABSOLUTE NRBC               0.00              0.0 - 0.2 K/*       DF                          AUTOMATED                             NEUTROPHILS                 73                43 - 78 %           LYMPHOCYTES                 22                13 - 44 %           MONOCYTES                   5                 4.0 - 12.0 %        EOSINOPHILS                 0 (L)             0.5 - 7.8 %         BASOPHILS                   0                 0.0 - 2.0 %         IMMATURE GRANULOCYTES       0                 0.0 - 5.0 %         ABS. NEUTROPHILS            3.4               1.7 - 8.2 K/*       ABS. LYMPHOCYTES            1.0               0.5 - 4.6 K/*       ABS. MONOCYTES              0.2               0.1 - 1.3 K/*       ABS. EOSINOPHILS            0.0               0.0 - 0.8 K/*       ABS. BASOPHILS              0.0               0.0 - 0.2 K/*       ABS. IMM.  GRANS.            0.0               0.0 - 0.5 K/*  -METABOLIC PANEL, COMPREHENSIVE       Result                      Value             Ref Range           Sodium                      136               136 - 145 mm*       Potassium                   3.3 (L)           3.5 - 5.1 mm*       Chloride                    101               98 - 107 mmo*       CO2                         28                21 - 32 mmol*       Anion gap                   7                 7 - 16 mmol/L       Glucose                     89                65 - 100 mg/*       BUN                         13                6 - 23 MG/DL        Creatinine                  1.11 0.8 - 1.5 MG*       GFR est AA                  >60               >60 ml/min/1*       GFR est non-AA              >60               >60 ml/min/1*       Calcium                     8.4               8.3 - 10.4 M*       Bilirubin, total            0.4               0.2 - 1.1 MG*       ALT (SGPT)                  21                12 - 65 U/L         AST (SGOT)                  23                15 - 37 U/L         Alk. phosphatase            120               50 - 136 U/L        Protein, total              7.3               6.3 - 8.2 g/*       Albumin                     3.5               3.5 - 5.0 g/*       Globulin                    3.8 (H)           2.3 - 3.5 g/*       A-G Ratio                   0.9 (L)           1.2 - 3.5      -TROPONIN-HIGH SENSITIVITY       Result                      Value             Ref Range           Troponin-High Sensitiv*     7.8               0 - 14 pg/mL   -EKG, 12 LEAD, INITIAL       Result                      Value             Ref Range           Ventricular Rate            98                BPM                 Atrial Rate                 98                BPM                 P-R Interval                148               ms                  QRS Duration                74                ms                  Q-T Interval                328               ms                  QTC Calculation (Bezet)     418               ms                  Calculated P Axis           48                degrees             Calculated R Axis           34                degrees             Calculated T Axis           22                degrees             Diagnosis                                                     Normal sinus rhythm   Nonspecific ST and T wave abnormality   Abnormal ECG   When compared with ECG of 05-OCT-2019 10:33,   Vent.  rate has increased BY  33 BPM   T wave inversion now evident in Anterior leads   Confirmed by Alicia Abbott ([id number]) on 2/18/2021 7:53:07 PM     )  Tests in the radiology section of CPT®: ordered and reviewed (Xr Chest Port    Result Date: 2/18/2021  PA CHEST X-RAY. Clinical Indication: Covid 19 positive , shortness of breath, cough, fever Comparison: Chest x-ray dated 10/5/2019 Findings: Single view of the chest submitted demonstrate the cardiac silhouette and mediastinum to be unremarkable. There is no pleural effusion or pneumothorax. The lungs are underexpanded bilaterally. No definite airspace consolidation or infiltrate.      Underexpanded lungs without definite acute abnormality.    )  Tests in the medicine section of CPT®: ordered and reviewed           Procedures

## 2021-02-21 ENCOUNTER — APPOINTMENT (OUTPATIENT)
Dept: CT IMAGING | Age: 61
DRG: 177 | End: 2021-02-21
Attending: HOSPITALIST
Payer: COMMERCIAL

## 2021-02-21 ENCOUNTER — HOSPITAL ENCOUNTER (INPATIENT)
Age: 61
LOS: 19 days | Discharge: HOME HEALTH | DRG: 177 | End: 2021-03-12
Attending: EMERGENCY MEDICINE | Admitting: INTERNAL MEDICINE
Payer: COMMERCIAL

## 2021-02-21 ENCOUNTER — APPOINTMENT (OUTPATIENT)
Dept: GENERAL RADIOLOGY | Age: 61
DRG: 177 | End: 2021-02-21
Attending: EMERGENCY MEDICINE
Payer: COMMERCIAL

## 2021-02-21 DIAGNOSIS — U07.1 PNEUMONIA DUE TO COVID-19 VIRUS: ICD-10-CM

## 2021-02-21 DIAGNOSIS — J12.82 PNEUMONIA DUE TO COVID-19 VIRUS: ICD-10-CM

## 2021-02-21 DIAGNOSIS — J96.01 ACUTE RESPIRATORY FAILURE WITH HYPOXIA (HCC): Primary | ICD-10-CM

## 2021-02-21 PROBLEM — J96.91 RESPIRATORY FAILURE WITH HYPOXIA (HCC): Status: ACTIVE | Noted: 2021-02-21

## 2021-02-21 LAB
ALBUMIN SERPL-MCNC: 3.2 G/DL (ref 3.5–5)
ALBUMIN/GLOB SERPL: 0.7 {RATIO} (ref 1.2–3.5)
ALP SERPL-CCNC: 108 U/L (ref 50–136)
ALT SERPL-CCNC: 28 U/L (ref 12–65)
ANION GAP SERPL CALC-SCNC: 10 MMOL/L (ref 7–16)
APPEARANCE UR: CLEAR
AST SERPL-CCNC: 34 U/L (ref 15–37)
ATRIAL RATE: 94 BPM
BACTERIA URNS QL MICRO: 0 /HPF
BASOPHILS # BLD: 0 K/UL (ref 0–0.2)
BASOPHILS NFR BLD: 0 % (ref 0–2)
BILIRUB SERPL-MCNC: 0.4 MG/DL (ref 0.2–1.1)
BILIRUB UR QL: NEGATIVE
BUN SERPL-MCNC: 12 MG/DL (ref 6–23)
CALCIUM SERPL-MCNC: 9 MG/DL (ref 8.3–10.4)
CALCULATED P AXIS, ECG09: 50 DEGREES
CALCULATED R AXIS, ECG10: 27 DEGREES
CALCULATED T AXIS, ECG11: 25 DEGREES
CASTS URNS QL MICRO: ABNORMAL /LPF
CHLORIDE SERPL-SCNC: 103 MMOL/L (ref 98–107)
CO2 SERPL-SCNC: 27 MMOL/L (ref 21–32)
COLOR UR: YELLOW
CREAT SERPL-MCNC: 1.16 MG/DL (ref 0.8–1.5)
DIAGNOSIS, 93000: NORMAL
DIFFERENTIAL METHOD BLD: ABNORMAL
EOSINOPHIL # BLD: 0 K/UL (ref 0–0.8)
EOSINOPHIL NFR BLD: 0 % (ref 0.5–7.8)
EPI CELLS #/AREA URNS HPF: 0 /HPF
ERYTHROCYTE [DISTWIDTH] IN BLOOD BY AUTOMATED COUNT: 12.1 % (ref 11.9–14.6)
FIBRINOGEN PPP-MCNC: 662 MG/DL (ref 190–501)
GLOBULIN SER CALC-MCNC: 4.3 G/DL (ref 2.3–3.5)
GLUCOSE SERPL-MCNC: 130 MG/DL (ref 65–100)
GLUCOSE UR STRIP.AUTO-MCNC: NEGATIVE MG/DL
HCT VFR BLD AUTO: 46.9 % (ref 41.1–50.3)
HGB BLD-MCNC: 15.8 G/DL (ref 13.6–17.2)
HGB UR QL STRIP: NEGATIVE
IMM GRANULOCYTES # BLD AUTO: 0 K/UL (ref 0–0.5)
IMM GRANULOCYTES NFR BLD AUTO: 1 % (ref 0–5)
KETONES UR QL STRIP.AUTO: 40 MG/DL
LACTATE SERPL-SCNC: 1.5 MMOL/L (ref 0.4–2)
LACTATE SERPL-SCNC: 1.7 MMOL/L (ref 0.4–2)
LEUKOCYTE ESTERASE UR QL STRIP.AUTO: NEGATIVE
LIPASE SERPL-CCNC: 185 U/L (ref 73–393)
LYMPHOCYTES # BLD: 0.9 K/UL (ref 0.5–4.6)
LYMPHOCYTES NFR BLD: 14 % (ref 13–44)
MCH RBC QN AUTO: 29.6 PG (ref 26.1–32.9)
MCHC RBC AUTO-ENTMCNC: 33.7 G/DL (ref 31.4–35)
MCV RBC AUTO: 88 FL (ref 79.6–97.8)
MONOCYTES # BLD: 0.4 K/UL (ref 0.1–1.3)
MONOCYTES NFR BLD: 6 % (ref 4–12)
NEUTS SEG # BLD: 4.9 K/UL (ref 1.7–8.2)
NEUTS SEG NFR BLD: 79 % (ref 43–78)
NITRITE UR QL STRIP.AUTO: NEGATIVE
NRBC # BLD: 0 K/UL (ref 0–0.2)
P-R INTERVAL, ECG05: 146 MS
PH UR STRIP: 6 [PH] (ref 5–9)
PLATELET # BLD AUTO: 195 K/UL (ref 150–450)
PMV BLD AUTO: 9.7 FL (ref 9.4–12.3)
POTASSIUM SERPL-SCNC: 3.4 MMOL/L (ref 3.5–5.1)
PROT SERPL-MCNC: 7.5 G/DL (ref 6.3–8.2)
PROT UR STRIP-MCNC: 100 MG/DL
Q-T INTERVAL, ECG07: 322 MS
QRS DURATION, ECG06: 74 MS
QTC CALCULATION (BEZET), ECG08: 402 MS
RBC # BLD AUTO: 5.33 M/UL (ref 4.23–5.6)
RBC #/AREA URNS HPF: ABNORMAL /HPF
SODIUM SERPL-SCNC: 140 MMOL/L (ref 136–145)
SP GR UR REFRACTOMETRY: 1.02 (ref 1–1.02)
UROBILINOGEN UR QL STRIP.AUTO: 0.2 EU/DL (ref 0.2–1)
VENTRICULAR RATE, ECG03: 94 BPM
WBC # BLD AUTO: 6.3 K/UL (ref 4.3–11.1)
WBC URNS QL MICRO: ABNORMAL /HPF

## 2021-02-21 PROCEDURE — 74011250637 HC RX REV CODE- 250/637: Performed by: INTERNAL MEDICINE

## 2021-02-21 PROCEDURE — 3E0333Z INTRODUCTION OF ANTI-INFLAMMATORY INTO PERIPHERAL VEIN, PERCUTANEOUS APPROACH: ICD-10-PCS | Performed by: INTERNAL MEDICINE

## 2021-02-21 PROCEDURE — 77010033678 HC OXYGEN DAILY

## 2021-02-21 PROCEDURE — 94640 AIRWAY INHALATION TREATMENT: CPT

## 2021-02-21 PROCEDURE — 71260 CT THORAX DX C+: CPT

## 2021-02-21 PROCEDURE — 93005 ELECTROCARDIOGRAM TRACING: CPT | Performed by: INTERNAL MEDICINE

## 2021-02-21 PROCEDURE — 74011000258 HC RX REV CODE- 258: Performed by: EMERGENCY MEDICINE

## 2021-02-21 PROCEDURE — 74011250636 HC RX REV CODE- 250/636: Performed by: EMERGENCY MEDICINE

## 2021-02-21 PROCEDURE — 71045 X-RAY EXAM CHEST 1 VIEW: CPT

## 2021-02-21 PROCEDURE — 94760 N-INVAS EAR/PLS OXIMETRY 1: CPT

## 2021-02-21 PROCEDURE — XW033E5 INTRODUCTION OF REMDESIVIR ANTI-INFECTIVE INTO PERIPHERAL VEIN, PERCUTANEOUS APPROACH, NEW TECHNOLOGY GROUP 5: ICD-10-PCS | Performed by: INTERNAL MEDICINE

## 2021-02-21 PROCEDURE — 81001 URINALYSIS AUTO W/SCOPE: CPT

## 2021-02-21 PROCEDURE — 74011000250 HC RX REV CODE- 250: Performed by: INTERNAL MEDICINE

## 2021-02-21 PROCEDURE — 87040 BLOOD CULTURE FOR BACTERIA: CPT

## 2021-02-21 PROCEDURE — 85025 COMPLETE CBC W/AUTO DIFF WBC: CPT

## 2021-02-21 PROCEDURE — 80053 COMPREHEN METABOLIC PANEL: CPT

## 2021-02-21 PROCEDURE — 74011250636 HC RX REV CODE- 250/636: Performed by: INTERNAL MEDICINE

## 2021-02-21 PROCEDURE — 2709999900 HC NON-CHARGEABLE SUPPLY

## 2021-02-21 PROCEDURE — 85384 FIBRINOGEN ACTIVITY: CPT

## 2021-02-21 PROCEDURE — 83605 ASSAY OF LACTIC ACID: CPT

## 2021-02-21 PROCEDURE — 96375 TX/PRO/DX INJ NEW DRUG ADDON: CPT

## 2021-02-21 PROCEDURE — 65270000029 HC RM PRIVATE

## 2021-02-21 PROCEDURE — 96365 THER/PROPH/DIAG IV INF INIT: CPT

## 2021-02-21 PROCEDURE — 74011000636 HC RX REV CODE- 636: Performed by: INTERNAL MEDICINE

## 2021-02-21 PROCEDURE — 74011250636 HC RX REV CODE- 250/636: Performed by: HOSPITALIST

## 2021-02-21 PROCEDURE — 36415 COLL VENOUS BLD VENIPUNCTURE: CPT

## 2021-02-21 PROCEDURE — 74011000258 HC RX REV CODE- 258: Performed by: INTERNAL MEDICINE

## 2021-02-21 PROCEDURE — 94664 DEMO&/EVAL PT USE INHALER: CPT

## 2021-02-21 PROCEDURE — 93005 ELECTROCARDIOGRAM TRACING: CPT | Performed by: EMERGENCY MEDICINE

## 2021-02-21 PROCEDURE — 83690 ASSAY OF LIPASE: CPT

## 2021-02-21 PROCEDURE — 99285 EMERGENCY DEPT VISIT HI MDM: CPT

## 2021-02-21 RX ORDER — ONDANSETRON 2 MG/ML
4 INJECTION INTRAMUSCULAR; INTRAVENOUS
Status: COMPLETED | OUTPATIENT
Start: 2021-02-21 | End: 2021-02-21

## 2021-02-21 RX ORDER — LORAZEPAM 2 MG/ML
1 INJECTION INTRAMUSCULAR ONCE
Status: COMPLETED | OUTPATIENT
Start: 2021-02-21 | End: 2021-02-21

## 2021-02-21 RX ORDER — PANTOPRAZOLE SODIUM 40 MG/1
40 TABLET, DELAYED RELEASE ORAL
Status: DISCONTINUED | OUTPATIENT
Start: 2021-02-22 | End: 2021-02-21 | Stop reason: SDUPTHER

## 2021-02-21 RX ORDER — ZINC SULFATE 50(220)MG
1 CAPSULE ORAL DAILY
Status: DISCONTINUED | OUTPATIENT
Start: 2021-02-22 | End: 2021-03-03

## 2021-02-21 RX ORDER — ALBUTEROL SULFATE 90 UG/1
2 AEROSOL, METERED RESPIRATORY (INHALATION)
Status: DISCONTINUED | OUTPATIENT
Start: 2021-02-21 | End: 2021-03-03

## 2021-02-21 RX ORDER — CETIRIZINE HCL 10 MG
10 TABLET ORAL
COMMUNITY
End: 2022-01-24

## 2021-02-21 RX ORDER — MORPHINE SULFATE 2 MG/ML
2 INJECTION, SOLUTION INTRAMUSCULAR; INTRAVENOUS ONCE
Status: COMPLETED | OUTPATIENT
Start: 2021-02-21 | End: 2021-02-21

## 2021-02-21 RX ORDER — FUROSEMIDE 10 MG/ML
40 INJECTION INTRAMUSCULAR; INTRAVENOUS ONCE
Status: DISCONTINUED | OUTPATIENT
Start: 2021-02-21 | End: 2021-02-21

## 2021-02-21 RX ORDER — FUROSEMIDE 10 MG/ML
20 INJECTION INTRAMUSCULAR; INTRAVENOUS ONCE
Status: COMPLETED | OUTPATIENT
Start: 2021-02-21 | End: 2021-02-21

## 2021-02-21 RX ORDER — MONTELUKAST SODIUM 10 MG/1
10 TABLET ORAL DAILY
Status: DISCONTINUED | OUTPATIENT
Start: 2021-02-21 | End: 2021-03-12 | Stop reason: HOSPADM

## 2021-02-21 RX ORDER — ASCORBIC ACID 500 MG
1000 TABLET ORAL 2 TIMES DAILY
Status: DISCONTINUED | OUTPATIENT
Start: 2021-02-21 | End: 2021-03-03

## 2021-02-21 RX ORDER — SODIUM CHLORIDE 9 MG/ML
250 INJECTION, SOLUTION INTRAVENOUS AS NEEDED
Status: CANCELLED | OUTPATIENT
Start: 2021-02-21

## 2021-02-21 RX ORDER — ONDANSETRON 2 MG/ML
4 INJECTION INTRAMUSCULAR; INTRAVENOUS
Status: DISCONTINUED | OUTPATIENT
Start: 2021-02-21 | End: 2021-03-12 | Stop reason: HOSPADM

## 2021-02-21 RX ORDER — TIZANIDINE 2 MG/1
4 TABLET ORAL
Status: DISCONTINUED | OUTPATIENT
Start: 2021-02-21 | End: 2021-03-12 | Stop reason: HOSPADM

## 2021-02-21 RX ORDER — LORAZEPAM 1 MG/1
1 TABLET ORAL
Status: DISCONTINUED | OUTPATIENT
Start: 2021-02-21 | End: 2021-02-24

## 2021-02-21 RX ORDER — HYOSCYAMINE SULFATE 0.12 MG/1
0.25 TABLET SUBLINGUAL
Status: DISCONTINUED | OUTPATIENT
Start: 2021-02-21 | End: 2021-03-12 | Stop reason: HOSPADM

## 2021-02-21 RX ORDER — ACETAMINOPHEN 650 MG/1
650 SUPPOSITORY RECTAL
Status: DISCONTINUED | OUTPATIENT
Start: 2021-02-21 | End: 2021-03-12 | Stop reason: HOSPADM

## 2021-02-21 RX ORDER — MELATONIN
2000 DAILY
Status: DISCONTINUED | OUTPATIENT
Start: 2021-02-22 | End: 2021-03-03

## 2021-02-21 RX ORDER — GUAIFENESIN 100 MG/5ML
200 SOLUTION ORAL
Status: DISCONTINUED | OUTPATIENT
Start: 2021-02-21 | End: 2021-03-12 | Stop reason: HOSPADM

## 2021-02-21 RX ORDER — ENOXAPARIN SODIUM 100 MG/ML
30 INJECTION SUBCUTANEOUS EVERY 12 HOURS
Status: DISCONTINUED | OUTPATIENT
Start: 2021-02-21 | End: 2021-03-12 | Stop reason: HOSPADM

## 2021-02-21 RX ORDER — FAMOTIDINE 20 MG/1
20 TABLET, FILM COATED ORAL 2 TIMES DAILY
Status: DISCONTINUED | OUTPATIENT
Start: 2021-02-21 | End: 2021-03-06

## 2021-02-21 RX ORDER — FUROSEMIDE 10 MG/ML
40 INJECTION INTRAMUSCULAR; INTRAVENOUS ONCE
Status: DISCONTINUED | OUTPATIENT
Start: 2021-02-21 | End: 2021-02-21 | Stop reason: SDUPTHER

## 2021-02-21 RX ORDER — UREA 10 %
220 LOTION (ML) TOPICAL DAILY
Status: DISCONTINUED | OUTPATIENT
Start: 2021-02-22 | End: 2021-02-21

## 2021-02-21 RX ORDER — BENZONATATE 100 MG/1
100 CAPSULE ORAL
Status: DISCONTINUED | OUTPATIENT
Start: 2021-02-21 | End: 2021-03-12 | Stop reason: HOSPADM

## 2021-02-21 RX ORDER — KETOROLAC TROMETHAMINE 15 MG/ML
15 INJECTION, SOLUTION INTRAMUSCULAR; INTRAVENOUS ONCE
Status: COMPLETED | OUTPATIENT
Start: 2021-02-21 | End: 2021-02-22

## 2021-02-21 RX ORDER — DEXAMETHASONE SODIUM PHOSPHATE 100 MG/10ML
6 INJECTION INTRAMUSCULAR; INTRAVENOUS EVERY 24 HOURS
Status: DISCONTINUED | OUTPATIENT
Start: 2021-02-21 | End: 2021-02-25

## 2021-02-21 RX ORDER — HYDROMORPHONE HYDROCHLORIDE 1 MG/ML
0.5 INJECTION, SOLUTION INTRAMUSCULAR; INTRAVENOUS; SUBCUTANEOUS ONCE
Status: COMPLETED | OUTPATIENT
Start: 2021-02-21 | End: 2021-02-21

## 2021-02-21 RX ORDER — SODIUM CHLORIDE 0.9 % (FLUSH) 0.9 %
10 SYRINGE (ML) INJECTION
Status: COMPLETED | OUTPATIENT
Start: 2021-02-21 | End: 2021-02-21

## 2021-02-21 RX ORDER — AMITRIPTYLINE HYDROCHLORIDE 10 MG/1
10 TABLET, FILM COATED ORAL
Status: DISCONTINUED | OUTPATIENT
Start: 2021-02-21 | End: 2021-03-12 | Stop reason: HOSPADM

## 2021-02-21 RX ORDER — MORPHINE SULFATE 2 MG/ML
1 INJECTION, SOLUTION INTRAMUSCULAR; INTRAVENOUS ONCE
Status: COMPLETED | OUTPATIENT
Start: 2021-02-21 | End: 2021-02-21

## 2021-02-21 RX ORDER — ACETAMINOPHEN 325 MG/1
650 TABLET ORAL
Status: DISCONTINUED | OUTPATIENT
Start: 2021-02-21 | End: 2021-03-12 | Stop reason: HOSPADM

## 2021-02-21 RX ORDER — CODEINE PHOSPHATE AND GUAIFENESIN 10; 100 MG/5ML; MG/5ML
5 SOLUTION ORAL
Status: DISCONTINUED | OUTPATIENT
Start: 2021-02-21 | End: 2021-03-12 | Stop reason: HOSPADM

## 2021-02-21 RX ORDER — ZOLPIDEM TARTRATE 5 MG/1
5 TABLET ORAL
Status: DISCONTINUED | OUTPATIENT
Start: 2021-02-21 | End: 2021-03-06

## 2021-02-21 RX ADMIN — GUAIFENESIN AND CODEINE PHOSPHATE 5 ML: 100; 10 SOLUTION ORAL at 12:20

## 2021-02-21 RX ADMIN — AMITRIPTYLINE HYDROCHLORIDE 10 MG: 10 TABLET, FILM COATED ORAL at 21:12

## 2021-02-21 RX ADMIN — SODIUM CHLORIDE 100 ML: 900 INJECTION, SOLUTION INTRAVENOUS at 21:53

## 2021-02-21 RX ADMIN — OXYCODONE HYDROCHLORIDE AND ACETAMINOPHEN 1000 MG: 500 TABLET ORAL at 12:19

## 2021-02-21 RX ADMIN — ONDANSETRON 4 MG: 2 INJECTION INTRAMUSCULAR; INTRAVENOUS at 09:28

## 2021-02-21 RX ADMIN — AZITHROMYCIN MONOHYDRATE 500 MG: 500 INJECTION, POWDER, LYOPHILIZED, FOR SOLUTION INTRAVENOUS at 10:00

## 2021-02-21 RX ADMIN — ALBUTEROL SULFATE 2 PUFF: 108 INHALANT RESPIRATORY (INHALATION) at 21:08

## 2021-02-21 RX ADMIN — MONTELUKAST 10 MG: 10 TABLET, FILM COATED ORAL at 12:20

## 2021-02-21 RX ADMIN — LORAZEPAM 1 MG: 2 INJECTION INTRAMUSCULAR; INTRAVENOUS at 10:16

## 2021-02-21 RX ADMIN — FAMOTIDINE 20 MG: 20 TABLET ORAL at 18:19

## 2021-02-21 RX ADMIN — ENOXAPARIN SODIUM 30 MG: 100 INJECTION SUBCUTANEOUS at 12:19

## 2021-02-21 RX ADMIN — IOPAMIDOL 75 ML: 755 INJECTION, SOLUTION INTRAVENOUS at 21:52

## 2021-02-21 RX ADMIN — OXYCODONE HYDROCHLORIDE AND ACETAMINOPHEN 1000 MG: 500 TABLET ORAL at 18:20

## 2021-02-21 RX ADMIN — REMDESIVIR 200 MG: 100 INJECTION, POWDER, LYOPHILIZED, FOR SOLUTION INTRAVENOUS at 16:49

## 2021-02-21 RX ADMIN — DEXAMETHASONE SODIUM PHOSPHATE 6 MG: 10 INJECTION INTRAMUSCULAR; INTRAVENOUS at 12:00

## 2021-02-21 RX ADMIN — FAMOTIDINE 20 MG: 20 TABLET ORAL at 12:20

## 2021-02-21 RX ADMIN — MORPHINE SULFATE 2 MG: 2 INJECTION, SOLUTION INTRAMUSCULAR; INTRAVENOUS at 09:28

## 2021-02-21 RX ADMIN — Medication 10 ML: at 21:13

## 2021-02-21 RX ADMIN — HYDROMORPHONE HYDROCHLORIDE 0.5 MG: 1 INJECTION, SOLUTION INTRAMUSCULAR; INTRAVENOUS; SUBCUTANEOUS at 10:00

## 2021-02-21 RX ADMIN — CEFTRIAXONE 2 G: 2 INJECTION, POWDER, FOR SOLUTION INTRAMUSCULAR; INTRAVENOUS at 10:00

## 2021-02-21 RX ADMIN — LORAZEPAM 1 MG: 1 TABLET ORAL at 22:13

## 2021-02-21 RX ADMIN — HYOSCYAMINE SULFATE 0.25 MG: 0.12 TABLET ORAL; SUBLINGUAL at 20:12

## 2021-02-21 RX ADMIN — TIZANIDINE 4 MG: 2 TABLET ORAL at 15:28

## 2021-02-21 RX ADMIN — FUROSEMIDE 20 MG: 10 INJECTION, SOLUTION INTRAMUSCULAR; INTRAVENOUS at 12:20

## 2021-02-21 RX ADMIN — SODIUM CHLORIDE 1000 ML: 900 INJECTION, SOLUTION INTRAVENOUS at 09:28

## 2021-02-21 RX ADMIN — MORPHINE SULFATE 1 MG: 2 INJECTION, SOLUTION INTRAMUSCULAR; INTRAVENOUS at 21:12

## 2021-02-21 RX ADMIN — ONDANSETRON 4 MG: 2 INJECTION INTRAMUSCULAR; INTRAVENOUS at 15:22

## 2021-02-21 NOTE — ED NOTES
TRANSFER - OUT REPORT:    Verbal report given to Shalini Buchanan RN (name) on Devyn Maher  being transferred to [facility] (unit) for routine progression of care       Report consisted of patients Situation, Background, Assessment and   Recommendations(SBAR). Information from the following report(s) SBAR, ED Summary, Procedure Summary, Intake/Output, MAR and Recent Results was reviewed with the receiving nurse. Lines:   Peripheral IV 02/21/21 Left Arm (Active)        Opportunity for questions and clarification was provided.       Patient transported with:   O2 @ 4 liters

## 2021-02-21 NOTE — PROGRESS NOTES
TRANSFER - IN REPORT:    Verbal report received from KAIA Aggarwal on India Escobar  being received from ED for routine progression of care      Report consisted of patients Situation, Background, Assessment and   Recommendations(SBAR). Information from the following report(s) SBAR was reviewed with the receiving nurse. Opportunity for questions and clarification was provided. Assessment will be completed upon patients arrival to unit and care will be assumed.

## 2021-02-21 NOTE — ED PROVIDER NOTES
The history is provided by the patient. Shortness of Breath  This is a new problem. The average episode lasts 3 days. The problem occurs continuously. The current episode started more than 2 days ago. The problem has been gradually worsening. Associated symptoms include a fever, cough, sputum production, vomiting and abdominal pain. Pertinent negatives include no headaches, no coryza, no rhinorrhea, no sore throat, no swollen glands, no ear pain, no neck pain, no hemoptysis, no wheezing, no PND, no orthopnea, no chest pain, no syncope, no rash, no leg pain, no leg swelling and no claudication. Precipitated by: Vicky. He has tried nothing for the symptoms. The treatment provided no relief. He has had no prior hospitalizations. He has had no prior ED visits. He has had no prior ICU admissions. Associated medical issues do not include asthma, COPD, pneumonia, chronic lung disease, PE, CAD, heart failure, past MI, DVT or recent surgery.         Past Medical History:   Diagnosis Date    Chest discomfort 11/18/2015    Tightness, pressure     Chronic left maxillary sinusitis     Depression     Dyspnea 11/18/2015    Shortness of breath      GERD (gastroesophageal reflux disease)     controlled with meds     Hypoglycemia     Mixed hyperlipidemia 11/18/2015       Past Surgical History:   Procedure Laterality Date    COLONOSCOPY      HX HEENT Left 12/10/2020    FESS w/ L max antrostomy/ant ethmoidectomy- Jeremiah Dove         Family History:   Problem Relation Age of Onset    Heart Attack Mother 66    High Cholesterol Father        Social History     Socioeconomic History    Marital status:      Spouse name: Not on file    Number of children: Not on file    Years of education: Not on file    Highest education level: Not on file   Occupational History    Not on file   Social Needs    Financial resource strain: Not on file    Food insecurity     Worry: Not on file     Inability: Not on file   [employer] needs     Medical: Not on file     Non-medical: Not on file   Tobacco Use    Smoking status: Never Smoker    Smokeless tobacco: Never Used   Substance and Sexual Activity    Alcohol use: No    Drug use: No    Sexual activity: Not on file   Lifestyle    Physical activity     Days per week: Not on file     Minutes per session: Not on file    Stress: Not on file   Relationships    Social connections     Talks on phone: Not on file     Gets together: Not on file     Attends Religion service: Not on file     Active member of club or organization: Not on file     Attends meetings of clubs or organizations: Not on file     Relationship status: Not on file    Intimate partner violence     Fear of current or ex partner: Not on file     Emotionally abused: Not on file     Physically abused: Not on file     Forced sexual activity: Not on file   Other Topics Concern    Not on file   Social History Narrative    Not on file         ALLERGIES: Iohexol and Penicillin g    Review of Systems   Constitutional: Positive for activity change, appetite change, chills, fatigue and fever. HENT: Negative for congestion, ear pain, rhinorrhea and sore throat. Respiratory: Positive for cough, sputum production and shortness of breath. Negative for hemoptysis and wheezing. Cardiovascular: Negative for chest pain, orthopnea, claudication, leg swelling, syncope and PND. Gastrointestinal: Positive for abdominal pain, diarrhea, nausea and vomiting. Negative for blood in stool and constipation. Musculoskeletal: Positive for myalgias. Negative for neck pain. Skin: Negative for rash. Neurological: Negative for headaches. All other systems reviewed and are negative. Vitals:    02/21/21 0911   BP: 134/80   Pulse: 99   Resp: (!) 36   Temp: 99.1 °F (37.3 °C)   SpO2: 94%   Weight: 83.9 kg (185 lb)   Height: 5' 8\" (1.727 m)            Physical Exam  Vitals signs and nursing note reviewed.    Constitutional:       General: He is in acute distress. Appearance: He is well-developed. HENT:      Head: Normocephalic and atraumatic. Right Ear: External ear normal.      Left Ear: External ear normal.   Eyes:      Extraocular Movements: Extraocular movements intact. Conjunctiva/sclera: Conjunctivae normal.      Pupils: Pupils are equal, round, and reactive to light. Neck:      Musculoskeletal: Normal range of motion and neck supple. Cardiovascular:      Rate and Rhythm: Normal rate and regular rhythm. Heart sounds: Normal heart sounds. No murmur. Pulmonary:      Effort: Pulmonary effort is normal. Tachypnea present. No accessory muscle usage. Breath sounds: Examination of the right-lower field reveals rhonchi. Examination of the left-lower field reveals rhonchi. Rhonchi present. No decreased breath sounds, wheezing or rales. Abdominal:      General: Bowel sounds are normal.      Palpations: Abdomen is soft. Tenderness: There is no abdominal tenderness. Musculoskeletal: Normal range of motion. Right lower leg: No edema. Left lower leg: No edema. Skin:     General: Skin is warm and dry. Capillary Refill: Capillary refill takes less than 2 seconds. Neurological:      General: No focal deficit present. Mental Status: He is alert and oriented to person, place, and time.    Psychiatric:         Mood and Affect: Mood normal.         Behavior: Behavior normal.              MDM  Number of Diagnoses or Management Options  Acute respiratory failure with hypoxia ([address]): new and requires workup  Pneumonia due to COVID-19 virus: new and requires workup     Amount and/or Complexity of Data Reviewed  Clinical lab tests: ordered and reviewed  Tests in the radiology section of CPT®: ordered and reviewed  Tests in the medicine section of CPT®: ordered and reviewed (EKG dated 21 February 2021 at 9:15 AM reveals a normal sinus rhythm with a rate of 94 bpm with a normal ND and QTc interval and normal axis. interpretation: normal EKG. )  Review and summarize past medical records: yes  Discuss the patient with other providers: yes    Risk of Complications, Morbidity, and/or Mortality  Presenting problems: high  Diagnostic procedures: moderate  Management options: moderate    Patient Progress  Patient progress: stable         Procedures    The patient was observed in the ED. Patient was complaining of pain and nausea as well as anxiety related to his disease process. He was given Zofran, morphine and Dilaudid for his discomfort and nausea and Ativan for his anxiety. Oxygenation improved well with oxygen of supplementation. Case discussed with hospitalist will admit. Results Reviewed:      Recent Results (from the past 24 hour(s))   EKG, 12 LEAD, INITIAL    Collection Time: 02/21/21  9:15 AM   Result Value Ref Range    Ventricular Rate 94 BPM    Atrial Rate 94 BPM    P-R Interval 146 ms    QRS Duration 74 ms    Q-T Interval 322 ms    QTC Calculation (Bezet) 402 ms    Calculated P Axis 50 degrees    Calculated R Axis 27 degrees    Calculated T Axis 25 degrees    Diagnosis       !! AGE AND GENDER SPECIFIC ECG ANALYSIS !! Normal sinus rhythm  Normal ECG  When compared with ECG of 18-FEB-2021 19:10,  No significant change was found     CBC WITH AUTOMATED DIFF    Collection Time: 02/21/21  9:21 AM   Result Value Ref Range    WBC 6.3 4.3 - 11.1 K/uL    RBC 5.33 4.23 - 5.6 M/uL    HGB 15.8 13.6 - 17.2 g/dL    HCT 46.9 41.1 - 50.3 %    MCV 88.0 79.6 - 97.8 FL    MCH 29.6 26.1 - 32.9 PG    MCHC 33.7 31.4 - 35.0 g/dL    RDW 12.1 11.9 - 14.6 %    PLATELET 167 551 - 958 K/uL    MPV 9.7 9.4 - 12.3 FL    ABSOLUTE NRBC 0.00 0.0 - 0.2 K/uL    DF AUTOMATED      NEUTROPHILS 79 (H) 43 - 78 %    LYMPHOCYTES 14 13 - 44 %    MONOCYTES 6 4.0 - 12.0 %    EOSINOPHILS 0 (L) 0.5 - 7.8 %    BASOPHILS 0 0.0 - 2.0 %    IMMATURE GRANULOCYTES 1 0.0 - 5.0 %    ABS. NEUTROPHILS 4.9 1.7 - 8.2 K/UL    ABS. LYMPHOCYTES 0.9 0.5 - 4.6 K/UL    ABS. MONOCYTES 0.4 0.1 - 1.3 K/UL    ABS. EOSINOPHILS 0.0 0.0 - 0.8 K/UL    ABS. BASOPHILS 0.0 0.0 - 0.2 K/UL    ABS. IMM. GRANS. 0.0 0.0 - 0.5 K/UL   METABOLIC PANEL, COMPREHENSIVE    Collection Time: 02/21/21  9:21 AM   Result Value Ref Range    Sodium 140 136 - 145 mmol/L    Potassium 3.4 (L) 3.5 - 5.1 mmol/L    Chloride 103 98 - 107 mmol/L    CO2 27 21 - 32 mmol/L    Anion gap 10 7 - 16 mmol/L    Glucose 130 (H) 65 - 100 mg/dL    BUN 12 6 - 23 MG/DL    Creatinine 1.16 0.8 - 1.5 MG/DL    GFR est AA >60 >60 ml/min/1.73m2    GFR est non-AA >60 >60 ml/min/1.73m2    Calcium 9.0 8.3 - 10.4 MG/DL    Bilirubin, total 0.4 0.2 - 1.1 MG/DL    ALT (SGPT) 28 12 - 65 U/L    AST (SGOT) 34 15 - 37 U/L    Alk. phosphatase 108 50 - 136 U/L    Protein, total 7.5 6.3 - 8.2 g/dL    Albumin 3.2 (L) 3.5 - 5.0 g/dL    Globulin 4.3 (H) 2.3 - 3.5 g/dL    A-G Ratio 0.7 (L) 1.2 - 3.5     LACTIC ACID    Collection Time: 02/21/21  9:21 AM   Result Value Ref Range    Lactic acid 1.7 0.4 - 2.0 MMOL/L   LIPASE    Collection Time: 02/21/21  9:21 AM   Result Value Ref Range    Lipase 185 73 - 393 U/L       XR CHEST PORT   Final Result   Increased pulmonary infiltrates without consolidation. FINDINGS can   be seen with volume overload and/or infection.

## 2021-02-21 NOTE — ED TRIAGE NOTES
Pt ambulatory to triage. Pt states tested positive for COVID on Sunday and is SOB. SpO2 in 60's at home. 84% on RA in triage. Taken to room 11. Tachypneic. Placed on 4LNC and increased to 92%. WOB is labored and remains tachypneic with SpO2 increases.

## 2021-02-21 NOTE — ED NOTES
Pt marcy Gaffneymargaux Joe updated on pt status. No questions or concerns at this time. Given information after code obtained.

## 2021-02-21 NOTE — PROGRESS NOTES
Zofran 2mL IV and Tizanidine 4mg PO given for patient complaints of nausea and upper abdominal cramps
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
## 2021-02-21 NOTE — PROGRESS NOTES
Diet changed to mechanical soft as patient is complaining o fit being hard to swallow more solid food.  He can swallow pills and eat soft foods no problem, I just want to make sure he is getting proper nutrients

## 2021-02-22 LAB
ABO + RH BLD: NORMAL
ALBUMIN SERPL-MCNC: 2.5 G/DL (ref 3.5–5)
ALBUMIN/GLOB SERPL: 0.7 {RATIO} (ref 1.2–3.5)
ALP SERPL-CCNC: 85 U/L (ref 50–136)
ALT SERPL-CCNC: 32 U/L (ref 12–65)
ANION GAP SERPL CALC-SCNC: 13 MMOL/L (ref 7–16)
AST SERPL-CCNC: 32 U/L (ref 15–37)
ATRIAL RATE: 94 BPM
BASOPHILS # BLD: 0 K/UL (ref 0–0.2)
BASOPHILS NFR BLD: 0 % (ref 0–2)
BILIRUB SERPL-MCNC: 0.2 MG/DL (ref 0.2–1.1)
BUN SERPL-MCNC: 18 MG/DL (ref 6–23)
CALCIUM SERPL-MCNC: 8.4 MG/DL (ref 8.3–10.4)
CALCULATED P AXIS, ECG09: 71 DEGREES
CALCULATED R AXIS, ECG10: 14 DEGREES
CALCULATED T AXIS, ECG11: 55 DEGREES
CHLORIDE SERPL-SCNC: 110 MMOL/L (ref 98–107)
CO2 SERPL-SCNC: 22 MMOL/L (ref 21–32)
CREAT SERPL-MCNC: 0.95 MG/DL (ref 0.8–1.5)
D DIMER PPP FEU-MCNC: 0.52 UG/ML(FEU)
DIAGNOSIS, 93000: NORMAL
DIFFERENTIAL METHOD BLD: ABNORMAL
EOSINOPHIL # BLD: 0 K/UL (ref 0–0.8)
EOSINOPHIL NFR BLD: 0 % (ref 0.5–7.8)
ERYTHROCYTE [DISTWIDTH] IN BLOOD BY AUTOMATED COUNT: 12.1 % (ref 11.9–14.6)
GLOBULIN SER CALC-MCNC: 3.8 G/DL (ref 2.3–3.5)
GLUCOSE SERPL-MCNC: 108 MG/DL (ref 65–100)
HCT VFR BLD AUTO: 40 % (ref 41.1–50.3)
HGB BLD-MCNC: 13.3 G/DL (ref 13.6–17.2)
IMM GRANULOCYTES # BLD AUTO: 0.1 K/UL (ref 0–0.5)
IMM GRANULOCYTES NFR BLD AUTO: 1 % (ref 0–5)
LYMPHOCYTES # BLD: 0.6 K/UL (ref 0.5–4.6)
LYMPHOCYTES NFR BLD: 10 % (ref 13–44)
MAGNESIUM SERPL-MCNC: 1.9 MG/DL (ref 1.8–2.4)
MCH RBC QN AUTO: 29.6 PG (ref 26.1–32.9)
MCHC RBC AUTO-ENTMCNC: 33.3 G/DL (ref 31.4–35)
MCV RBC AUTO: 88.9 FL (ref 79.6–97.8)
MONOCYTES # BLD: 0.6 K/UL (ref 0.1–1.3)
MONOCYTES NFR BLD: 10 % (ref 4–12)
NEUTS SEG # BLD: 4.9 K/UL (ref 1.7–8.2)
NEUTS SEG NFR BLD: 78 % (ref 43–78)
NRBC # BLD: 0 K/UL (ref 0–0.2)
P-R INTERVAL, ECG05: 172 MS
PLATELET # BLD AUTO: 204 K/UL (ref 150–450)
PMV BLD AUTO: 9.8 FL (ref 9.4–12.3)
POTASSIUM SERPL-SCNC: 3.6 MMOL/L (ref 3.5–5.1)
PROT SERPL-MCNC: 6.3 G/DL (ref 6.3–8.2)
Q-T INTERVAL, ECG07: 328 MS
QRS DURATION, ECG06: 92 MS
QTC CALCULATION (BEZET), ECG08: 410 MS
RBC # BLD AUTO: 4.5 M/UL (ref 4.23–5.6)
SODIUM SERPL-SCNC: 145 MMOL/L (ref 136–145)
VENTRICULAR RATE, ECG03: 94 BPM
WBC # BLD AUTO: 6.2 K/UL (ref 4.3–11.1)

## 2021-02-22 PROCEDURE — 36415 COLL VENOUS BLD VENIPUNCTURE: CPT

## 2021-02-22 PROCEDURE — 74011250636 HC RX REV CODE- 250/636: Performed by: INTERNAL MEDICINE

## 2021-02-22 PROCEDURE — 94762 N-INVAS EAR/PLS OXIMTRY CONT: CPT

## 2021-02-22 PROCEDURE — XW13325 TRANSFUSION OF CONVALESCENT PLASMA (NONAUTOLOGOUS) INTO PERIPHERAL VEIN, PERCUTANEOUS APPROACH, NEW TECHNOLOGY GROUP 5: ICD-10-PCS | Performed by: INTERNAL MEDICINE

## 2021-02-22 PROCEDURE — 94760 N-INVAS EAR/PLS OXIMETRY 1: CPT

## 2021-02-22 PROCEDURE — 65270000029 HC RM PRIVATE

## 2021-02-22 PROCEDURE — 77010033711 HC HIGH FLOW OXYGEN

## 2021-02-22 PROCEDURE — 85025 COMPLETE CBC W/AUTO DIFF WBC: CPT

## 2021-02-22 PROCEDURE — 74011250637 HC RX REV CODE- 250/637: Performed by: INTERNAL MEDICINE

## 2021-02-22 PROCEDURE — 80053 COMPREHEN METABOLIC PANEL: CPT

## 2021-02-22 PROCEDURE — 94640 AIRWAY INHALATION TREATMENT: CPT

## 2021-02-22 PROCEDURE — 85379 FIBRIN DEGRADATION QUANT: CPT

## 2021-02-22 PROCEDURE — 74011000250 HC RX REV CODE- 250: Performed by: INTERNAL MEDICINE

## 2021-02-22 PROCEDURE — 86900 BLOOD TYPING SEROLOGIC ABO: CPT

## 2021-02-22 PROCEDURE — 74011000258 HC RX REV CODE- 258: Performed by: INTERNAL MEDICINE

## 2021-02-22 PROCEDURE — 74011250636 HC RX REV CODE- 250/636: Performed by: HOSPITALIST

## 2021-02-22 PROCEDURE — 36430 TRANSFUSION BLD/BLD COMPNT: CPT

## 2021-02-22 PROCEDURE — 83735 ASSAY OF MAGNESIUM: CPT

## 2021-02-22 RX ORDER — SODIUM CHLORIDE 9 MG/ML
250 INJECTION, SOLUTION INTRAVENOUS AS NEEDED
Status: DISCONTINUED | OUTPATIENT
Start: 2021-02-22 | End: 2021-03-12 | Stop reason: HOSPADM

## 2021-02-22 RX ADMIN — BENZONATATE 100 MG: 100 CAPSULE ORAL at 21:04

## 2021-02-22 RX ADMIN — KETOROLAC TROMETHAMINE 15 MG: 15 INJECTION, SOLUTION INTRAMUSCULAR; INTRAVENOUS at 00:04

## 2021-02-22 RX ADMIN — ENOXAPARIN SODIUM 30 MG: 100 INJECTION SUBCUTANEOUS at 23:01

## 2021-02-22 RX ADMIN — MONTELUKAST 10 MG: 10 TABLET, FILM COATED ORAL at 08:20

## 2021-02-22 RX ADMIN — ZINC SULFATE 220 MG (50 MG) CAPSULE 1 CAPSULE: CAPSULE at 08:20

## 2021-02-22 RX ADMIN — ALBUTEROL SULFATE 2 PUFF: 108 INHALANT RESPIRATORY (INHALATION) at 07:40

## 2021-02-22 RX ADMIN — ENOXAPARIN SODIUM 30 MG: 100 INJECTION SUBCUTANEOUS at 11:24

## 2021-02-22 RX ADMIN — ALBUTEROL SULFATE 2 PUFF: 108 INHALANT RESPIRATORY (INHALATION) at 19:33

## 2021-02-22 RX ADMIN — ENOXAPARIN SODIUM 30 MG: 100 INJECTION SUBCUTANEOUS at 00:04

## 2021-02-22 RX ADMIN — OXYCODONE HYDROCHLORIDE AND ACETAMINOPHEN 1000 MG: 500 TABLET ORAL at 17:06

## 2021-02-22 RX ADMIN — FAMOTIDINE 20 MG: 20 TABLET ORAL at 08:20

## 2021-02-22 RX ADMIN — REMDESIVIR 100 MG: 100 INJECTION, POWDER, LYOPHILIZED, FOR SOLUTION INTRAVENOUS at 16:00

## 2021-02-22 RX ADMIN — LORAZEPAM 1 MG: 1 TABLET ORAL at 19:59

## 2021-02-22 RX ADMIN — LORAZEPAM 1 MG: 1 TABLET ORAL at 11:25

## 2021-02-22 RX ADMIN — GUAIFENESIN AND CODEINE PHOSPHATE 5 ML: 100; 10 SOLUTION ORAL at 08:20

## 2021-02-22 RX ADMIN — ALBUTEROL SULFATE 2 PUFF: 108 INHALANT RESPIRATORY (INHALATION) at 13:29

## 2021-02-22 RX ADMIN — OXYCODONE HYDROCHLORIDE AND ACETAMINOPHEN 1000 MG: 500 TABLET ORAL at 08:20

## 2021-02-22 RX ADMIN — VITAMIN D, TAB 1000IU (100/BT) 2000 UNITS: 25 TAB at 08:20

## 2021-02-22 RX ADMIN — DEXAMETHASONE SODIUM PHOSPHATE 6 MG: 10 INJECTION INTRAMUSCULAR; INTRAVENOUS at 11:25

## 2021-02-22 RX ADMIN — FAMOTIDINE 20 MG: 20 TABLET ORAL at 17:06

## 2021-02-22 RX ADMIN — AMITRIPTYLINE HYDROCHLORIDE 10 MG: 10 TABLET, FILM COATED ORAL at 23:01

## 2021-02-22 NOTE — PROGRESS NOTES
Briefed by staff on Mr. Sil Winters new diagnosis.  follow-up is planned to explore spiritual/emotional needs as patient is able.     Jose Husain 70  Board Certified

## 2021-02-22 NOTE — ACP (ADVANCE CARE PLANNING)
Advance Care Planning     General Advance Care Planning (ACP) Conversation      Date of Conversation: 2/21/2021  Conducted with: Patient with Decision Making Capacity    Healthcare Decision Maker:     Click here to complete [address] including selection of the [address] Relationship (ie \"Primary\")  Today we documented Decision Maker(s) consistent with Legal Next of Kin hierarchy.     Content/Action Overview:   Has NO ACP documents/care preferences - refer to ACP Clinical Specialist  Reviewed DNR/DNI and patient elects Full Code (Attempt Resuscitation)  Topics discussed: ventilation preferences and resuscitation preferences  Additional Comments: No POA     Length of Voluntary ACP Conversation in minutes:  16 minutes    Una Anna

## 2021-02-22 NOTE — PROGRESS NOTES
[facility]ist Service Progress Note    INTERVAL HISTORY  / Subjective: Worsening Shortness and cough     Assessment / Plan:  Acute hypoxemic respiratory failure secondary to bilateral COVID-19 pneumonia  -He had acute oxygen desaturations  87% on 4.5 NC, requiring high flow nasal cannula which  Improved his oxygen saturations to 90%, not at desired goal of 92% SPO2  -Note, high flow nasal cannula has long green tubing, bore size of tubing is adequate but long length the tubing is probably limiting flow rate  -Also he is not prone positioning, advised left, right, hyper angulated in supine, and prone positioning, prone orders   -Start EARLY  heated high flow nasal cannula ( Airvo) 40-60lpm, FIO2 Titrate for SPO2 > 92%  - Request CTA eval for P/E  - No evidence of volume overload on exam  - Again advised importance of Prone positioning                   This visit took in excess of 45 minutes of Critical Care time in evaluation and management of a critically ill or injured patient, such that the critical illness or injury acutely impairs one or more organ system: Acute Hypoxemic Respiratory failure  such that there is a high probability of imminent or life-threatening deterioration in the patient's condition needing immediate attention or presence of critical care physician near bedside for the above time . The time listed is exclusive of any procedures which may have been performed. Critical care time includes time spent at bedside performing patient interview and physical exam, time spent researching patient prior to interaction with patient, time spent discussing findings and treatment plan with patient and/or family, time spent discussing patient with consultants and colleagues, time spent reviewing pertinent laboratory and radiographic evaluations, time spent re-evaluating patient, or time spent discussing patient with nursing staff.          Chief Complaint : Shortness of Breath and Concern For COVID-19 (Coronavirus)        Objective:  Visit Vitals  /73 (BP 1 Location: Right upper arm, BP Patient Position: At rest;Supine)   Pulse 88   Temp 98.8 °F (37.1 °C)   Resp 18   Ht 5' 8\" (1.727 m)   Wt 83.9 kg (185 lb)   SpO2 92%   BMI 28.13 kg/m²                 Physical Exam:  General:  speaking in full sentences but visibly dyspneic, using sternal clinoid mastoid muscles HEENT: Pupils equal and reactive to light and accommodation, oropharynx is clear   Neck: Supple, no lymphadenopathy, no JVD   Lungs: diminished  Clear to auscultation bilaterally   Cardiovascular: Regular rate and rhythm with normal S1 and S2   Abdomen: Soft, nontender, nondistended, normoactive bowel sounds   Extremities: No cyanosis clubbing or edema   Neuro: Nonfocal, A&O x3   Psych: Normal affect     Intake and Output:  Date 02/20/21 1900 - 02/21/21 0659(Not Admitted) 02/21/21 0700 - 02/22/21 0659   Shift 1614-6519 24 Hour Total 3852-4692 5753-8835 24 Hour Total   INTAKE   P.O.   114  114     P.O.   114  114   I.V.   1050(1)  1050     Volume (sodium chloride 0.9 % bolus infusion 1,000 mL)   1000  1000     Volume (cefTRIAXone (ROCEPHIN) 2 g in 0.9% sodium chloride (MBP/ADV) 50 mL MBP)   50  50   Shift Total(mL/kg)   1164(13.9)  6107(29.1)   OUTPUT   Urine   250(0.2)  250     Urine Voided   250  250   Shift Total(mL/kg)   250(3)  250(3)   NET   914  914   Weight (kg)   83.9 83.9 83.9       LAB:  Admission on 02/21/2021   Component Date Value    WBC 02/21/2021 6.3     RBC 02/21/2021 5.33     HGB 02/21/2021 15.8     HCT 02/21/2021 46.9     MCV 02/21/2021 88.0     MCH 02/21/2021 29.6     MCHC 02/21/2021 33.7     RDW 02/21/2021 12.1     PLATELET 55/22/4207 984     MPV 02/21/2021 9.7     ABSOLUTE NRBC 02/21/2021 0.00     DF 02/21/2021 AUTOMATED     NEUTROPHILS 02/21/2021 79*    LYMPHOCYTES 02/21/2021 14     MONOCYTES 02/21/2021 6     EOSINOPHILS 02/21/2021 0*    BASOPHILS 02/21/2021 0     IMMATURE GRANULOCYTES 02/21/2021 1  ABS. NEUTROPHILS 02/21/2021 4.9     ABS. LYMPHOCYTES 02/21/2021 0.9     ABS. MONOCYTES 02/21/2021 0.4     ABS. EOSINOPHILS 02/21/2021 0.0     ABS. BASOPHILS 02/21/2021 0.0     ABS. IMM. GRANS. 02/21/2021 0.0     Sodium 02/21/2021 140     Potassium 02/21/2021 3.4*    Chloride 02/21/2021 103     CO2 02/21/2021 27     Anion gap 02/21/2021 10     Glucose 02/21/2021 130*    BUN 02/21/2021 12     Creatinine 02/21/2021 1.16     GFR est AA 02/21/2021 >60     GFR est non-AA 02/21/2021 >60     Calcium 02/21/2021 9.0     Bilirubin, total 02/21/2021 0.4     ALT (SGPT) 02/21/2021 28     AST (SGOT) 02/21/2021 34     Alk. phosphatase 02/21/2021 108     Protein, total 02/21/2021 7.5     Albumin 02/21/2021 3.2*    Globulin 02/21/2021 4.3*    A-G Ratio 02/21/2021 0.7*    Lactic acid 02/21/2021 1.7     Lactic acid 02/21/2021 1.5     Lipase 02/21/2021 185     Color 02/21/2021 YELLOW     Appearance 02/21/2021 CLEAR     Specific gravity 02/21/2021 1.018     pH (UA) 02/21/2021 6.0     Protein 02/21/2021 100*    Glucose 02/21/2021 Negative     Ketone 02/21/2021 40*    Bilirubin 02/21/2021 Negative     Blood 02/21/2021 Negative     Urobilinogen 02/21/2021 0.2     Nitrites 02/21/2021 Negative     Leukocyte Esterase 02/21/2021 Negative     WBC 02/21/2021 0-3     RBC 02/21/2021 0-3     Epithelial cells 02/21/2021 0     Bacteria 02/21/2021 0     Casts 02/21/2021 0-3     Ventricular Rate 02/21/2021 94     Atrial Rate 02/21/2021 94     P-R Interval 02/21/2021 146     QRS Duration 02/21/2021 74     Q-T Interval 02/21/2021 322     QTC Calculation (Bezet) 02/21/2021 402     Calculated P Axis 02/21/2021 50     Calculated R Axis 02/21/2021 27     Calculated T Axis 02/21/2021 25     Diagnosis 02/21/2021                      Value:!! AGE AND GENDER SPECIFIC ECG ANALYSIS !!   Normal sinus rhythm  Normal ECG  When compared with ECG of 18-FEB-2021 19:10,  No significant change was found  Confirmed by ST KACY RIZZO MD (), EDWARD SYLVESTER ([id number]) on 2/21/2021 4:49:02 PM      Fibrinogen 02/21/2021 662*    SARS-CoV-2, EL 02/15/2021 DETECTED*       IMAGING:  Xr Chest Port    Result Date: 2/21/2021  Increased pulmonary infiltrates without consolidation. FINDINGS can be seen with volume overload and/or infection. Xr Chest Port    Result Date: 2/18/2021  Underexpanded lungs without definite acute abnormality.       EKG:  Results for orders placed or performed in visit on 09/02/15   [facility] POC EKG ROUTINE W/ 12 LEADS, INTER & REP     Status: None    Impression    Normal               Buck Hernandez MD  2/21/2021 9:12 PM

## 2021-02-22 NOTE — PROGRESS NOTES
Pt c/o of cramping pain in LUQ of abdomen. Gave PRN hyoscyamine 0.25 as order. No relief. Tachypenic at 43 resp/min. Guarding abdomen. Coughing up white phlem. O2 at 4.5 L 88%. Increased O2 at to HF at 5 L. No response in O2. Increased O2 to 15 L HF, O2 only at 90%. MD on the floor, notified of changes. CT of chest w/ contrast ordered. Placed patient on HF heated NC via [employer]. Safety measures in place.

## 2021-02-22 NOTE — PROGRESS NOTES
[employer] received request to offer assistance with [address]. Spoke with nurse to ensure that patient was sufficiently alert and oriented to proceed with consult. Provided nurse with HCPOA document to share with Mr. Suhail Poole along with instructions for reaching the  for assistance if needed.  follow-up is planned as patient is available. Rev.  Steve Piedra MDiv, BS  Board Certified

## 2021-02-22 NOTE — ACP (ADVANCE CARE PLANNING)
[employer] received request to offer assistance with [address]. Spoke with nurse to ensure that patient was sufficiently alert and oriented to proceed with consult. Provided nurse with HCPOA document to share with Mr. Dana Cazares along with instructions for reaching the  for assistance if needed.  follow-up is planned as patient is available. Rev.  Brett Anderson MDiv, BS  Board Certified

## 2021-02-22 NOTE — H&P
[facility]  History and Physical    Patient ID:  Miki Flores  male  1960  [id number]    Admission Date: 2/21/2021  Chief Complaint: Worsening shortness of breath   Reason for Admission: Acute hypoxic respiratory failure secondary to COVID-19    ASSESSMENT & PLAN:    Acute hypoxic respiratory failure-the patient presented with sats of 88 to 84% on room air per ER staff. He is currently now on 4 L of oxygen and satting 95%. We will continue supplemental oxygen. Treat him appropriately for the underlying cause which is a COVID-19 and monitor for resolution. COVID-19 pneumonia-we will treat him appropriately with Decadron remdesivir and convalescent plasma. He is agreeable to all 3 modalities of therapy. Concern for possible superimposed bacterial pneumonia-we will check a pro-Ishan and lactic acid and start him empirically on ceftriaxone and doxycycline    Recurrent episodes of hypoglycemia at baseline for years per the patient -will monitor his blood sugars here in the hospital and manage him as appropriate    Continue other current medical management   further work-up and management based on his clinical course this    Disposition: For now a Covid unit. Likely to home on discharge  Diet: Regular  VTE ppx: Lovenox  GI ppx: Pepcid  CODE STATUS: Full  Surrogate decision-maker: Marcus Cedillo- [phone]    HISTORY OF PRESENT ILLNESS:  Patient is a 64 y.o. male with medical h/o anxiety and chronic gastric who was seen in the ER on February 18, 2021 and diagnosed with COVID-19 pneumonia. He was discharged to home but per him he continued to get progressively worse and short of breath. Per the patient he denies entire family are sick with the COVID-19. They did have a gathering for the Super Bowl and this is where he thinks he may have gotten infected. In the ER the patient was found to have O2 sats of 80 to 84% on room air. He was placed on 4 L of oxygen as above.   And the hospitalist were asked to admit him for further management and treatment. The patient is very anxious. He is short of breath and nausea. But he denies any fevers, chills, diarrhea or burning or pain on micturition. He has had worsening of his baseline nausea and vomiting over the last several days with the COVID-19. [facility] and has had extensive work-up in the past for the nausea and vomiting as well as general dyspeptic symptoms with no etiology found. He states that he does run in his family and they called it the Adrienne Jacques family curse. Allergies   Allergen Reactions    Iohexol Diarrhea     Other reaction(s): Abdominal pain-I    Penicillin G Swelling       Prior to Admission Medications   Prescriptions Last Dose Informant Patient Reported? Taking? LORazepam (ATIVAN) 1 mg tablet   No No   Sig: Take 1 Tab by mouth every eight (8) hours as needed for Anxiety. Max Daily Amount: 3 mg. Lactobacillus acidophilus 10 billion cell cap   Yes No   Si Billion One capsule daily   amitriptyline (ELAVIL) 10 mg tablet   Yes No   Sig: TAKE 1 TABLET BY MOUTH EVERYDAY AT BEDTIME   benzonatate (Tessalon Perles) 100 mg capsule   No No   Sig: Take 1 Cap by mouth three (3) times daily as needed for Cough for up to 7 days. famotidine (PEPCID) 20 mg tablet   Yes No   Sig: Take 20 mg by mouth two (2) times a day. guaiFENesin-codeine (GUAIFENESIN AC) 100-10 mg/5 mL solution   No No   Sig: Take 5 mL by mouth three (3) times daily as needed for Cough. Max Daily Amount: 15 mL.   hyoscyamine SL (LEVSIN/SL) 0.125 mg SL tablet   Yes No   Sig: TAKE 1 TABLET BY SUBLINGUAL ROUTE 4 TIMES EVERY DAY AS NEEDED FOR PAIN/CRAMPING   montelukast (SINGULAIR) 10 mg tablet   No No   Sig: Take 1 Tab by mouth daily. naproxen (NAPROSYN) 500 mg tablet   No No   Sig: Take 1 Tab by mouth two (2) times daily (with meals). omeprazole (PRILOSEC) 40 mg capsule   Yes No   Sig: Take 40 mg by mouth daily. ondansetron (ZOFRAN ODT) 8 mg disintegrating tablet   No No   Sig: Take 1 Tab by mouth every eight (8) hours as needed for Nausea. tiZANidine (ZANAFLEX) 4 mg tablet   No No   Sig: Take 1 Tab by mouth three (3) times daily as needed. zolpidem (AMBIEN) 5 mg tablet   No No   Sig: Take 1 Tab by mouth nightly as needed for Sleep. Max Daily Amount: 5 mg. Facility-Administered Medications: None         Past Medical History:   Diagnosis Date    Chest discomfort 11/18/2015    Tightness, pressure     Chronic left maxillary sinusitis     Depression     Dyspnea 11/18/2015    Shortness of breath      GERD (gastroesophageal reflux disease)     controlled with meds     Hypoglycemia     Mixed hyperlipidemia 11/18/2015   Past Medical History:   Diagnosis Date    Chest discomfort 11/18/2015    Tightness, pressure     Chronic left maxillary sinusitis     Depression     Dyspnea 11/18/2015    Shortness of breath      GERD (gastroesophageal reflux disease)     controlled with meds     Hypoglycemia     Mixed hyperlipidemia 11/18/2015      Procedure Laterality Date    COLONOSCOPY      HX HEENT Left 12/10/2020    FESS w/ L max antrostomy/ant ethmoidectomy- Jeremiah Dove         Social History     Tobacco Use    Smoking status: Never Smoker    Smokeless tobacco: Never Used   Substance Use Topics    Alcohol use: No       FAMILY HISTORY: Mother suffers from diabetes hypertension. Family History   Problem Relation Age of Onset    Heart Attack Mother 66    High Cholesterol Father        REVIEW OF SYSTEMS:  A 14 point review of systems was taken and pertinent positive as per HPI. PHYSICAL EXAM:    Visit Vitals  /73 (BP 1 Location: Right upper arm, BP Patient Position: At rest;Supine)   Pulse 86   Temp 98.8 °F (37.1 °C)   Resp 18   Ht 5' 8\" (1.727 m)   Wt 83.9 kg (185 lb)   SpO2 90%   BMI 28.13 kg/m²       General: Middle-aged gentleman in obvious distress secondary to shortness of breath and anxiety.   He is not speaking in full sentences and it takes him a while to gather his breath even though he is on 4 L of oxygen some use of accessory muscles .     HEENT: Pupils equal and reactive to light and accommodation, oropharynx is clear   Neck: Supple, no lymphadenopathy, no JVD     Lungs: Equal to auscultation bilaterally diminished in the bases   Cardiovascular: Regular rate and rhythm with normal S1 and S2     Abdomen: Soft, nontender, nondistended, normoactive bowel sounds   Extremities: No cyanosis clubbing or edema     Neuro: Nonfocal, A&O x3   Psych: Normal mood and affect     Intake/Output last 3 shifts:    Date 02/20/21 1900 - 02/21/21 0659(Not Admitted) 02/21/21 0700 - 02/22/21 0659   Shift 4290-7629 24 Hour Total 8457-3482 0673-0289 24 Hour Total   INTAKE   P.O.   114  114     P.O.   114  114   I.V.   1050(1)  1050     Volume (sodium chloride 0.9 % bolus infusion 1,000 mL)   1000  1000     Volume (cefTRIAXone (ROCEPHIN) 2 g in 0.9% sodium chloride (MBP/ADV) 50 mL MBP)   50  50   Shift Total(mL/kg)   2214(94.2)  8202(19.3)   OUTPUT   Urine   250(0.2)  250     Urine Voided   250  250   Shift Total(mL/kg)   250(3)  250(3)   NET   914  914   Weight (kg)   83.9 83.9 83.9         Labs:  CMP:   Lab Results   Component Value Date/Time     02/21/2021 09:21 AM    K 3.4 (L) 02/21/2021 09:21 AM     02/21/2021 09:21 AM    CO2 27 02/21/2021 09:21 AM    AGAP 10 02/21/2021 09:21 AM     (H) 02/21/2021 09:21 AM    BUN 12 02/21/2021 09:21 AM    CREA 1.16 02/21/2021 09:21 AM    GFRAA >60 02/21/2021 09:21 AM    GFRNA >60 02/21/2021 09:21 AM    CA 9.0 02/21/2021 09:21 AM    ALB 3.2 (L) 02/21/2021 09:21 AM    TBILI 0.4 02/21/2021 09:21 AM    TP 7.5 02/21/2021 09:21 AM    GLOB 4.3 (H) 02/21/2021 09:21 AM    AGRAT 0.7 (L) 02/21/2021 09:21 AM    ALT 28 02/21/2021 09:21 AM         CBC:    Lab Results   Component Value Date/Time    WBC 6.3 02/21/2021 09:21 AM    HGB 15.8 02/21/2021 09:21 AM    HCT 46.9 02/21/2021 09:21 AM  02/21/2021 09:21 AM       No results found for: INR, PTMR, PTP, PT1, PT2, INREXT, INREXT    ABG:  No results found for: PH, PHI, PCO2, PCO2I, PO2, PO2I, HCO3, HCO3I, FIO2, FIO2I        Lab Results   Component Value Date/Time    Troponin-I, Qt. <0.02 (L) 10/05/2019 01:48 PM         Imaging & Other Studies:    XR Results (maximum last 3): Results from Hospital Encounter encounter on 02/21/21   XR CHEST PORT    Narrative Portable chest xray      Comparison: 2/18/2021 and prior    Indication: Shortness of breath    Findings: Mild cardiac enlargement. Increased mild perihilar pulmonary  infiltrates without consolidation, effusion, pneumothorax. No discrete osseous  abnormality on the single view. Impression Increased pulmonary infiltrates without consolidation. FINDINGS can  be seen with volume overload and/or infection. Results from [facility] encounter on 02/18/21   XR CHEST PORT    Narrative PA CHEST X-RAY. Clinical Indication: Covid 19 positive , shortness of breath, cough, fever    Comparison: Chest x-ray dated 10/5/2019    Findings: Single view of the chest submitted demonstrate the cardiac silhouette  and mediastinum to be unremarkable. There is no pleural effusion or  pneumothorax. The lungs are underexpanded bilaterally. No definite airspace  consolidation or infiltrate. Impression Underexpanded lungs without definite acute abnormality. Results from [place] [facility] encounter on 10/05/19   XR CHEST PA LAT    Narrative Chest X-ray    INDICATION: Right chest pain    PA and lateral views of the chest were obtained. FINDINGS: The lungs are clear. There are no infiltrates or effusions. The heart  size is normal.  The bony thorax is intact. Impression IMPRESSION: No acute findings in the chest         CT Results (maximum last 3): Results from [facility] encounter on 04/26/18   CT CARDIAC OVER READ    Narrative CT CORONARY ANGIOGRAPHY WITH CONTRAST.     HISTORY: Chest pain. TECHNIQUE: 2.5 mm noncontrast axial scans with small field-of-view centered  about the heart were followed by 119 cc contrast intravenously and 0.625 mm  axial scans through the heart. FINDINGS: Please see cardiologist's report regarding the coronary arteries. Within the included portion of the thorax and abdomen: No pulmonary nodules, air  space disease or pleural effusion. No evidence of aortic dissection. No gross  bony lesions. Pulmonary arteries are grossly unremarkable. Multiple  low-attenuation foci scattered throughout the liver consistent with simple  cysts. No gross bony lesions. Impression IMPRESSION: Simple liver cysts, otherwise unremarkable. CT CORONARY ART W CA+ W EF    Narrative CT Coronary Arteries with Calcium 4/26/2018    INDICATION: 65-year-old male referred for cardiac CTA with a history of chest  pain and negative stress test in the past.    PROCEDURE: A 64 slice CT coronary angiogram procedure was performed. 110 mL of  Optiray 350 were administered intravenously followed by axial imaging through  the chest with a standard cardiac reconstruction protocol. Patient was given  oral beta blockers prior to the procedure. The average heart rate was 62 beats  per minute. The calcium score was calculated and left ventricular function was  not assessed. Technical Quality: Fair quality with some misregistration artifact noted to the  coronary arteries    INTERPRETATION:     Coronary Calcium Score: 0     Coronary Angiography:    1. The left main originates from the left cusp and divides into the left  anterior descending circumflex artery appears normal   2. The left anterior descending large vessel coursing the apex. It appears  normal throughout its very large caliber in the proximal segment. There is no  calcium identified.  A some misregistration artifact in the mid vessel which cuts  off and I believe overall it is normal. The distal LAD at the apex is normal  large diagonal branches appear normal  3. The left circumflex large vessel which appears normal. Provides a fairly  large obtuse marginal branch which appears normal   4. The dominant right coronary artery dominant right coronary artery which  appears normal.    Left Ventricle: Normal size and thickness   Left Atrium: Normal size left atrium with the normal appendage and pulmonary  veins. Right Ventricle: Normal size right ventricle   Right Atrium: Normal size  Pericardium: No pericardial effusion  Aorta: Normal caliber aorta with no calcium identified. Pulmonary Artery: Normal   Valves: Grossly normal-appearing valves   Mediastinum / Lung Fields: Normal. Lung fields. There are some cysts in the  liver. This portion be over read by radiology. Impression IMPRESSION:  1. Coronary Calcium Score 0    2. Normal appearing coronary arteries. 3. Normal chamber dimensions. Results from [facility] encounter on 17   CT ABD PELV W CONT       MRI Results (maximum last 3): Results from [facility] encounter on 10/14/08   MRI LUMBAR SPINE W/O CONT    Narrative [address]                                                            [facility]          NAME: Shaila Salo                                                           PT : 1960               SEX: M         MR#: [id number]     LOCATION/NS: MR-                 AGE: 47Y      ACCT: [de-identified]     ORDERING: Brennan Escobar                    PT TYPE: Julio Ramirez                         RADIOLOGIST: Perla James ([id number])  Final Report       ICD Codes / Adm. Diagnosis:                  /     Examination:  MRI LUMBAR SPINE WO  - [id number] - Oct 14 2008  8:27AM       MRI LUMBAR SPINE WITHOUT CONTRAST 10/14/08     Reason:   41year-old with bilateral lower extremity pain, no known injury. No history of prior surgery.   Patient has a known sebaceous cyst in the back. REPORT:      TECHNIQUE:  Sagittal T1, T2, STIR, axial T1 and T2-weighted sequences are   available for review. COMPARISON: None available. FINDINGS: There is degenerative disc signal present at L5-S1. There is   preservation of vertebral body height throughout. There is normal alignment   present. The remaining discs are normal in signal intensity. The conus is   normal in configuration and signal intensity throughout. Incompletely   evaluated, as seen on the  sequence, there are multiple bilateral renal   cysts. These measure up to 8 cm. There is a subcutaneous cystic structure   present which measures 12 x 26 x 25 mm. L2-3:  No significant disc herniation, neural foraminal narrowing or spinal   canal stenosis. L3-4:  No disc herniation, neural foraminal narrowing or spinal canal   stenosis. L4-5:  No significant disc herniation, neural foraminal narrowing or spinal   canal stenosis. L5-S1:  There is a circumferential disc bulge at this level with loss of   disc height and degenerative disc signal.  There is no significant mass   effect upon the descending or exiting nerve roots. IMPRESSION:      1. THERE IS DEGENERATIVE DISC DISEASE AT L5-S1 WITH A CIRCUMFERENTIAL DISC   BULGE, LOSS OF DISC HEIGHT AND LOSS OF DISC SIGNAL. 2. MULTIPLE BILATERAL RENAL CYSTS ARE PRESENT. 3. PROBABLE SEBACEOUS CYST. Interpreting/Reading Doctor: Aleisha Dickson ([id number])  Transcribed: PRECIOUS on 10/14/2008  Approved: Aleisha Dickson ([id number])  10/14/2008             Distribution:  Attending Doctor: Thu Coyne               Nuclear Medicine Results (maximum last 3): No results found for this or any previous visit. US Results (maximum last 3):   Results from [facility] encounter on 12/17/10   [address] [address]                                                            ULTRASOUND                          NAME: Ana María Ivan                                                           PT : 1960               SEX: M         MR#: [id number]     LOCATION/NS: US-                 AGE: [address]      ACCT: [de-identified]     ORDERING: Andrew Taylor                    PT TYPE: Jose Carrasco ([id number])  Final Report       ICD Codes / Adm. Diagnosis:    /     Examination:  ABDOMINAL ULTRASOUND  - [id number] - Dec 17 2010  8:00AM    Reason:  ABD PAIN    REPORT:  Abdominal Ultrasound    INDICATION:  Abdominal pain    FINDINGS: There are multiple cysts in the liver measuring up to 2 cm in   size. No definite solid liver masses seen. There are no lesions visualized   portions of the spleen or pancreas. . There is no bile duct dilatation. The   common bile duct measures 5 mm. There several fixed echogenic polyps in the   gallbladder. The major 6 mm in size. There is no wall thickening of   gallbladder distention. .    The right kidney measures 10.9 cm in length. The left kidney measures 11.7   cm. There is no hydronephrosis. There are complex cyst in both kidneys. The   cyst in the left kidney measures 7.6 cm in size. The cyst in the right   kidney measures 5.4 cm. The cysts were noted on prior ultrasound 2008   and are relatively stable in size. . There is no ascites. The aorta and   inferior vena cava are normal in caliber. IMPRESSION:   1. Stable small gallbladder polyps.   2. Stable cysts in the liver and kidneys        Interpreting/Reading Doctor: Jeff Brower ([id number])  Transcribed:  on 2010  Approved: Jeff Brower ([id number])  2010             Distribution:  Attending Doctor: Andrew Taylor           Results from Hospital Encounter encounter on 08   [address] [facility], [address] [place] [address]                                                            ULTRASOUND                          NAME: Matthew Cedeno                                                           PT : 1960               SEX: M         MR#: [id number]     LOCATION/NS: CT--                AGE: 47Y      ACCT: [de-identified]     ORDERING: Bronwyn KRISHNA              PT TYPE: Melia Hicks                         Jerilyn Sargent ([id number])               ICD Codes / Adm. Diagnosis:                  /     Examination:  ABDOMINAL ULTRASOUND  - [id number] - 2008 12:46PM       ABDOMINAL ULTRASOUND, 08:    Reason:   Renal and hepatic cysts demonstrated on the CT. TECHNIQUE:  Sonographic images of the abdomen were obtained. REPORT:       Multiple simple cysts are present throughout the liver,   measuring up to 1.8 cm in diameter in the left hepatic lobe. No solid   hepatic masses or biliary ductal dilatation is present. The pancreas is   normal in appearance. A few small polyps are present in the gallbladder. The common bile duct is 4.1 mm in diameter. The right kidney is 12.0 cm in   length. The right kidney contains a 4.7 cm simple cyst.    The left kidney contains a multicystic mass measuring 7.5 x 6.8 cm in   diameter. The septations are thin and linear and no solid nodular areas are   present. This may be a complex cyst.  The spleen is 10.0 cm in length. IMPRESSION:    1. MULTIPLE HEPATIC CYSTS. 2.     MULTI-SEPTATED CYST IN THE LEFT KIDNEY MEASURING UP TO 6.9 CM IN   DIAMETER. THIS MAY BE A CLASS IIF CYST. NO DEFINITE SOURCE FOR THE   PATIENT'S HEMATURIA IS IDENTIFIED. AS A NEXT STEP, THIS COULD BE FURTHER   CHARACTERIZED WITH A RENAL PROTOCOL CT WITHOUT AND WITH CONTRAST TO ASSESS   FOR ANY SOLID ENHANCEMENT.          Interpreting/Reading Doctor: Kathya Dan ([id number])  Transcribed: TLS on 02/15/2008  Approved: Kathya Dan ([id number])  02/15/2008 Distribution:  Attending Doctor: Kareen  Results (maximum last 3): No results found for this or any previous visit. LEXX Results (maximum last 3): No results found for this or any previous visit. IR Results (maximum last 3): No results found for this or any previous visit. VAS/US Results (maximum last 3): No results found for this or any previous visit. PET Results (maximum last 3): No results found for this or any previous visit. EKG Results     Procedure 720 Value Units Date/Time    EKG 12 LEAD INITIAL [[id number]] Collected: 02/21/21 0915    Order Status: Completed Updated: 02/21/21 1649     Ventricular Rate 94 BPM      Atrial Rate 94 BPM      P-R Interval 146 ms      QRS Duration 74 ms      Q-T Interval 322 ms      QTC Calculation (Bezet) 402 ms      Calculated P Axis 50 degrees      Calculated R Axis 27 degrees      Calculated T Axis 25 degrees      Diagnosis --     !! AGE AND GENDER SPECIFIC ECG ANALYSIS !!   Normal sinus rhythm  Normal ECG  When compared with ECG of 18-FEB-2021 19:10,  No significant change was found  Confirmed by ST KACY RIZZO MD (), EDWARD SYLVESTER ([id number]) on 2/21/2021 4:49:02 PM            Active Problems:    Patient Active Problem List    Diagnosis Date Noted    Respiratory failure with hypoxia ([facility] [address] 02/21/2021    COVID-19 02/21/2021    Fall 12/11/2018    Acute left-sided low back pain without sciatica 12/11/2018    Contusion of left knee 12/11/2018    Abnormal ultrasound of liver 07/18/2018    Acute gastritis 07/18/2018    Gastroesophageal reflux disease without esophagitis 07/18/2018    Internal hemorrhoids 07/18/2018    IBS (irritable bowel syndrome) 07/18/2018    Elevated alkaline phosphatase level 07/18/2018    Testosterone deficiency 03/02/2018    Kidney stones 11/17/2017    Anxiety disorder 04/21/2017    Mixed hyperlipidemia 11/18/2015    Chest discomfort 11/18/2015    Dyspnea 11/18/2015    Dyslipidemia 09/02/2015 Tess Taveras MD  [facility]  2/21/2021 11:12 AM

## 2021-02-22 NOTE — PROGRESS NOTES
Morphine 1 mg IV given to pt for pain 9/10 in abdomen as ordered. Pt in bed. Safety measures in place. Respirations even and unlabored.

## 2021-02-22 NOTE — PROGRESS NOTES
Am assessment completed. Pt is alert and oriented x 4 but very anxious. Had hycodan cough syrup this am. Jazzy Fire needs well. Consent signed for Plasma. Safety measures in place.

## 2021-02-22 NOTE — PROGRESS NOTES
[facility] Hospitalist Service  History and Physical    Patient ID:  Jorge Luis Haney  male  1960  [id number]    Addendum: renal mass noted on ct chest. Renal ultrasound ordered. Admission Date: 2/21/2021  Chief Complaint: Worsening shortness of breath   Reason for Admission: Acute hypoxic respiratory failure secondary to COVID-19    ASSESSMENT & PLAN:    Acute hypoxic respiratory failure- the patient presented with sats of 88 to 84% on room air per ER staff. He is currently now on 4 L of oxygen and satting 95%. We will continue supplemental oxygen. Treat him appropriately for the underlying cause which is a COVID-19 and monitor for resolution. February 22, 2021-  -Now on Airvo we will continue and wean As tolerated  -Continue steroids  -Encourage proning  -Continue incentive spirometry      COVID-19 pneumonia-  February 22, 2021  -Continue steroids and remdesivir. Convalescent plasma is pending  -Encourage proning    Concern for possible superimposed bacterial pneumonia-  February 22, 2021-continue ceftriaxone and doxycycline      Recurrent episodes of hypoglycemia at baseline for years per the patient -  February 22, 2021-continue to monitor blood sugars and cover with insulin if needed    Continue other current medical management   further work-up and management based on his clinical course this    Disposition: Continue on the COVID-19 unit for now. likely to home on discharge  Diet: Regular  VTE ppx: Lovenox  GI ppx: Pepcid  CODE STATUS: Full  Surrogate decision-maker: Tiffani Stauffer [phone]    HISTORY OF PRESENT ILLNESS:  Patient is a 64 y.o. male with medical h/o anxiety and chronic gastric who was seen in the ER on February 18, 2021 and diagnosed with COVID-19 pneumonia. He was discharged to home but per him he continued to get progressively worse and short of breath. Per the patient he denies entire family are sick with the COVID-19.   They did have a gathering for the Super Bowl and this is where he thinks he may have gotten infected. In the ER the patient was found to have O2 sats of 80 to 84% on room air. He was placed on 4 L of oxygen as above. And the hospitalist were asked to admit him for further management and treatment. The patient is very anxious. He is short of breath and nausea. But he denies any fevers, chills, diarrhea or burning or pain on micturition. He has had worsening of his baseline nausea and vomiting over the last several days with the COVID-19. [facility] and has had extensive work-up in the past for the nausea and vomiting as well as general dyspeptic symptoms with no etiology found. He states that he does run in his family and they called it the [employer] curse. 2021- he had to be placed on Airvo overnight. He still has not proned. Denies any chest pain fevers chills nausea \"of breath. Feels like the dyspnea comes and goes. Afebrile overnight. Allergies   Allergen Reactions    Iohexol Diarrhea     Other reaction(s): Abdominal pain-I    Penicillin G Swelling       Prior to Admission Medications   Prescriptions Last Dose Informant Patient Reported? Taking? LORazepam (ATIVAN) 1 mg tablet   No No   Sig: Take 1 Tab by mouth every eight (8) hours as needed for Anxiety. Max Daily Amount: 3 mg. Lactobacillus acidophilus 10 billion cell cap   Yes No   Si Billion One capsule daily   amitriptyline (ELAVIL) 10 mg tablet   Yes No   Sig: TAKE 1 TABLET BY MOUTH EVERYDAY AT BEDTIME   benzonatate (Tessalon Perles) 100 mg capsule   No No   Sig: Take 1 Cap by mouth three (3) times daily as needed for Cough for up to 7 days. famotidine (PEPCID) 20 mg tablet   Yes No   Sig: Take 20 mg by mouth two (2) times a day. guaiFENesin-codeine (GUAIFENESIN AC) 100-10 mg/5 mL solution   No No   Sig: Take 5 mL by mouth three (3) times daily as needed for Cough.  Max Daily Amount: 15 mL.   hyoscyamine SL (LEVSIN/SL) 0.125 mg SL tablet   Yes No   Sig: TAKE 1 TABLET BY SUBLINGUAL ROUTE 4 TIMES EVERY DAY AS NEEDED FOR PAIN/CRAMPING   montelukast (SINGULAIR) 10 mg tablet   No No   Sig: Take 1 Tab by mouth daily. naproxen (NAPROSYN) 500 mg tablet   No No   Sig: Take 1 Tab by mouth two (2) times daily (with meals). omeprazole (PRILOSEC) 40 mg capsule   Yes No   Sig: Take 40 mg by mouth daily. ondansetron (ZOFRAN ODT) 8 mg disintegrating tablet   No No   Sig: Take 1 Tab by mouth every eight (8) hours as needed for Nausea. tiZANidine (ZANAFLEX) 4 mg tablet   No No   Sig: Take 1 Tab by mouth three (3) times daily as needed. zolpidem (AMBIEN) 5 mg tablet   No No   Sig: Take 1 Tab by mouth nightly as needed for Sleep. Max Daily Amount: 5 mg. Facility-Administered Medications: None       Diagnosis Date    Chest discomfort 11/18/2015    Tightness, pressure     Chronic left maxillary sinusitis     Depression     Dyspnea 11/18/2015    Shortness of breath      GERD (gastroesophageal reflux disease)     controlled with meds     Hypoglycemia     Mixed hyperlipidemia 11/18/2015   Past Medical History:   Diagnosis Date    Chest discomfort 11/18/2015    Tightness, pressure     Chronic left maxillary sinusitis     Depression     Dyspnea 11/18/2015    Shortness of breath      GERD (gastroesophageal reflux disease)     controlled with meds     Hypoglycemia     Mixed hyperlipidemia 11/18/2015      Procedure Laterality Date    COLONOSCOPY      HX HEENT Left 12/10/2020    FESS w/ L max antrostomy/ant ethmoidectomy- Marydel Fair         Social History     Tobacco Use    Smoking status: Never Smoker    Smokeless tobacco: Never Used   Substance Use Topics    Alcohol use: No       FAMILY HISTORY: Mother suffers from diabetes hypertension.   Family History   Problem Relation Age of Onset    Heart Attack Mother 66    High Cholesterol Father        REVIEW OF SYSTEMS:  A 14 point review of systems was taken and pertinent positive as per HPI. PHYSICAL EXAM:    Visit Vitals  /73 (BP 1 Location: Right upper arm, BP Patient Position: At rest;Supine)   Pulse 86   Temp 98.8 °F (37.1 °C)   Resp 18   Ht 5' 8\" (1.727 m)   Wt 83.9 kg (185 lb)   SpO2 90%   BMI 28.13 kg/m²       General: Middle-aged gentleman in obvious distress secondary to shortness of breath and anxiety. He is not speaking in full sentences but no use of accessory muscles of respiration today. Appears ill. HEENT: Pupils equal and reactive to light and accommodation, oropharynx is clear   Neck: Supple, no lymphadenopathy, no JVD     Lungs: Equal to auscultation bilaterally.   Air entry equal bilaterally and diminished in the bases   Cardiovascular: Regular rate and rhythm with normal S1 and S2     Abdomen: Soft, nontender, nondistended, normoactive bowel sounds   Extremities: No cyanosis clubbing or edema     Neuro: Nonfocal, A&O x3   Psych: Normal mood and affect     Intake/Output last 3 shifts:    Date 02/20/21 1900 - 02/21/21 0659(Not Admitted) 02/21/21 0700 - 02/22/21 0659   Shift 7897-6267 24 Hour Total 2755-6113 6101-6982 24 Hour Total   INTAKE   P.O.   114  114     P.O.   114  114   I.V.   1050(1)  1050     Volume (sodium chloride 0.9 % bolus infusion 1,000 mL)   1000  1000     Volume (cefTRIAXone (ROCEPHIN) 2 g in 0.9% sodium chloride (MBP/ADV) 50 mL MBP)   50  50   Shift Total(mL/kg)   6569(63.3)  0728(04.7)   OUTPUT   Urine   250(0.2)  250     Urine Voided   250  250   Shift Total(mL/kg)   250(3)  250(3)   NET   914  914   Weight (kg)   83.9 83.9 83.9         Labs:  CMP:   Lab Results   Component Value Date/Time     02/21/2021 09:21 AM    K 3.4 (L) 02/21/2021 09:21 AM     02/21/2021 09:21 AM    CO2 27 02/21/2021 09:21 AM    AGAP 10 02/21/2021 09:21 AM     (H) 02/21/2021 09:21 AM    BUN 12 02/21/2021 09:21 AM    CREA 1.16 02/21/2021 09:21 AM    GFRAA >60 02/21/2021 09:21 AM    GFRNA >60 02/21/2021 09:21 AM    CA 9.0 02/21/2021 09:21 AM    ALB 3.2 (L) 02/21/2021 09:21 AM    TBILI 0.4 02/21/2021 09:21 AM    TP 7.5 02/21/2021 09:21 AM    GLOB 4.3 (H) 02/21/2021 09:21 AM    AGRAT 0.7 (L) 02/21/2021 09:21 AM    ALT 28 02/21/2021 09:21 AM         CBC:    Lab Results   Component Value Date/Time    WBC 6.3 02/21/2021 09:21 AM    HGB 15.8 02/21/2021 09:21 AM    HCT 46.9 02/21/2021 09:21 AM     02/21/2021 09:21 AM       No results found for: INR, PTMR, PTP, PT1, PT2, INREXT, INREXT    ABG:  No results found for: PH, PHI, PCO2, PCO2I, PO2, PO2I, HCO3, HCO3I, FIO2, FIO2I        Lab Results   Component Value Date/Time    Troponin-I, Qt. <0.02 (L) 10/05/2019 01:48 PM         Imaging & Other Studies:    XR Results (maximum last 3): Results from Hospital Encounter encounter on 02/21/21   XR CHEST PORT    Narrative Portable chest xray      Comparison: 2/18/2021 and prior    Indication: Shortness of breath    Findings: Mild cardiac enlargement. Increased mild perihilar pulmonary  infiltrates without consolidation, effusion, pneumothorax. No discrete osseous  abnormality on the single view. Impression Increased pulmonary infiltrates without consolidation. FINDINGS can  be seen with volume overload and/or infection. Results from [place] encounter on 02/18/21   XR CHEST PORT    Narrative PA CHEST X-RAY. Clinical Indication: Covid 19 positive , shortness of breath, cough, fever    Comparison: Chest x-ray dated 10/5/2019    Findings: Single view of the chest submitted demonstrate the cardiac silhouette  and mediastinum to be unremarkable. There is no pleural effusion or  pneumothorax. The lungs are underexpanded bilaterally. No definite airspace  consolidation or infiltrate. Impression Underexpanded lungs without definite acute abnormality.    Results from [place] encounter on 10/05/19   XR CHEST PA LAT    Narrative Chest X-ray    INDICATION: Right chest pain    PA and lateral views of the chest were obtained. FINDINGS: The lungs are clear. There are no infiltrates or effusions. The heart  size is normal.  The bony thorax is intact. Impression IMPRESSION: No acute findings in the chest         CT Results (maximum last 3): Results from [place] encounter on 04/26/18   CT CARDIAC OVER READ    Narrative CT CORONARY ANGIOGRAPHY WITH CONTRAST. HISTORY: Chest pain. TECHNIQUE: 2.5 mm noncontrast axial scans with small field-of-view centered  about the heart were followed by 119 cc contrast intravenously and 0.625 mm  axial scans through the heart. FINDINGS: Please see cardiologist's report regarding the coronary arteries. Within the included portion of the thorax and abdomen: No pulmonary nodules, air  space disease or pleural effusion. No evidence of aortic dissection. No gross  bony lesions. Pulmonary arteries are grossly unremarkable. Multiple  low-attenuation foci scattered throughout the liver consistent with simple  cysts. No gross bony lesions. Impression IMPRESSION: Simple liver cysts, otherwise unremarkable. CT CORONARY ART W CA+ W EF    Narrative CT Coronary Arteries with Calcium 4/26/2018    INDICATION: 37-year-old male referred for cardiac CTA with a history of chest  pain and negative stress test in the past.    PROCEDURE: A 64 slice CT coronary angiogram procedure was performed. 110 mL of  Optiray 350 were administered intravenously followed by axial imaging through  the chest with a standard cardiac reconstruction protocol. Patient was given  oral beta blockers prior to the procedure. The average heart rate was 62 beats  per minute. The calcium score was calculated and left ventricular function was  not assessed. Technical Quality: Fair quality with some misregistration artifact noted to the  coronary arteries    INTERPRETATION:     Coronary Calcium Score: 0     Coronary Angiography:    1.  The left main originates from the left cusp and divides into the left  anterior descending circumflex artery appears normal   2. The left anterior descending large vessel coursing the apex. It appears  normal throughout its very large caliber in the proximal segment. There is no  calcium identified. A some misregistration artifact in the mid vessel which cuts  off and I believe overall it is normal. The distal LAD at the apex is normal  large diagonal branches appear normal  3. The left circumflex large vessel which appears normal. Provides a fairly  large obtuse marginal branch which appears normal   4. The dominant right coronary artery dominant right coronary artery which  appears normal.    Left Ventricle: Normal size and thickness   Left Atrium: Normal size left atrium with the normal appendage and pulmonary  veins. Right Ventricle: Normal size right ventricle   Right Atrium: Normal size  Pericardium: No pericardial effusion  Aorta: Normal caliber aorta with no calcium identified. Pulmonary Artery: Normal   Valves: Grossly normal-appearing valves   Mediastinum / Lung Fields: Normal. Lung fields. There are some cysts in the  liver. This portion be over read by radiology. Impression IMPRESSION:  1. Coronary Calcium Score 0    2. Normal appearing coronary arteries. 3. Normal chamber dimensions. Results from [facility] encounter on 17   CT ABD PELV W CONT       MRI Results (maximum last 3):   Results from [place] encounter on 10/14/08   MRI LUMBAR SPINE W/O CONT    Narrative [address]                                                            [facility]          NAME: Matthew Cedeno                                                           PT : 1960               SEX: M         MR#: [id number]     LOCATION/NS: MR-                 AGE: 47Y      ACCT: [de-identified]     ORDERING: Jose Manuel Esquivel PT TYPE: O                         RADIOLOGIST: Jos Hill ([id number])  Final Report       ICD Codes / Adm. Diagnosis:                  /     Examination:  MRI LUMBAR SPINE WO  - [id number] - Oct 14 2008  8:27AM       MRI LUMBAR SPINE WITHOUT CONTRAST 10/14/08     Reason:   41year-old with bilateral lower extremity pain, no known injury. No history of prior surgery. Patient has a known sebaceous cyst in the back. REPORT:      TECHNIQUE:  Sagittal T1, T2, STIR, axial T1 and T2-weighted sequences are   available for review. COMPARISON: None available. FINDINGS: There is degenerative disc signal present at L5-S1. There is   preservation of vertebral body height throughout. There is normal alignment   present. The remaining discs are normal in signal intensity. The conus is   normal in configuration and signal intensity throughout. Incompletely   evaluated, as seen on the  sequence, there are multiple bilateral renal   cysts. These measure up to 8 cm. There is a subcutaneous cystic structure   present which measures 12 x 26 x 25 mm. L2-3:  No significant disc herniation, neural foraminal narrowing or spinal   canal stenosis. L3-4:  No disc herniation, neural foraminal narrowing or spinal canal   stenosis. L4-5:  No significant disc herniation, neural foraminal narrowing or spinal   canal stenosis. L5-S1:  There is a circumferential disc bulge at this level with loss of   disc height and degenerative disc signal.  There is no significant mass   effect upon the descending or exiting nerve roots. IMPRESSION:      1. THERE IS DEGENERATIVE DISC DISEASE AT L5-S1 WITH A CIRCUMFERENTIAL DISC   BULGE, LOSS OF DISC HEIGHT AND LOSS OF DISC SIGNAL. 2. MULTIPLE BILATERAL RENAL CYSTS ARE PRESENT. 3. PROBABLE SEBACEOUS CYST.      Interpreting/Reading Doctor: Jos Hill ([id number])  Transcribed: TLS on 10/14/2008  Approved: Jos Hill ([id number])  10/14/2008             Distribution: Attending Doctor: Mert Sosa               Nuclear Medicine Results (maximum last 3): No results found for this or any previous visit. US Results (maximum last 3): [facility]   [place] [address]                    [address] [facility] [place], [address]                                                            ULTRASOUND                          NAME: Carlos Brewster                                                           PT : 1960               SEX: M         MR#: [id number]     LOCATION/NS: US-                 AGE: [address] [place]      ACCT: [de-identified]     ORDERING: Mert Sosa                    PT TYPE: Deshawn Winton                         RADIOLOGISTPrincella Dance ([id number])  Final Report       ICD Codes / Adm. Diagnosis:    /     Examination:  ABDOMINAL ULTRASOUND  - [id number] - Dec 17 2010  8:00AM    Reason:  ABD PAIN    REPORT:  Abdominal Ultrasound    INDICATION:  Abdominal pain    FINDINGS: There are multiple cysts in the liver measuring up to 2 cm in   size. No definite solid liver masses seen. There are no lesions visualized   portions of the spleen or pancreas. . There is no bile duct dilatation. The   common bile duct measures 5 mm. There several fixed echogenic polyps in the   gallbladder. The major 6 mm in size. There is no wall thickening of   gallbladder distention. .    The right kidney measures 10.9 cm in length. The left kidney measures 11.7   cm. There is no hydronephrosis. There are complex cyst in both kidneys. The   cyst in the left kidney measures 7.6 cm in size. The cyst in the right   kidney measures 5.4 cm. The cysts were noted on prior ultrasound 2008   and are relatively stable in size. . There is no ascites. The aorta and   inferior vena cava are normal in caliber. IMPRESSION:   1. Stable small gallbladder polyps.   2. Stable cysts in the liver and kidneys        Interpreting/Reading Doctor: Chapis Chacon ([id number])  Transcribed:  on 2010  Approved: Chapis Chacon ([id number])  2010             Distribution:  Attending Doctor: Braulio Aguilar           Results from Hospital Encounter encounter on 08   [address], [address] [place] [address] [facility]                                                            ULTRASOUND                          NAME: Kalpana Carlisle                                                           PT : 1960               SEX: M         MR#: [id number]     LOCATION/NS: CT--                AGE: 47Y      ACCT: [de-identified]     ORDERING: Martinez KRISHNA              [facility] ([id number])               ICD Codes / Adm. Diagnosis:                  /     Examination:  ABDOMINAL ULTRASOUND  - [id number] - 2008 12:46PM       ABDOMINAL ULTRASOUND, 08:    Reason:   Renal and hepatic cysts demonstrated on the CT. TECHNIQUE:  Sonographic images of the abdomen were obtained. REPORT:       Multiple simple cysts are present throughout the liver,   measuring up to 1.8 cm in diameter in the left hepatic lobe. No solid   hepatic masses or biliary ductal dilatation is present. The pancreas is   normal in appearance. A few small polyps are present in the gallbladder. The common bile duct is 4.1 mm in diameter. The right kidney is 12.0 cm in   length. The right kidney contains a 4.7 cm simple cyst.    The left kidney contains a multicystic mass measuring 7.5 x 6.8 cm in   diameter. The septations are thin and linear and no solid nodular areas are   present. This may be a complex cyst.  The spleen is 10.0 cm in length. IMPRESSION:    1. MULTIPLE HEPATIC CYSTS. 2.     MULTI-SEPTATED CYST IN THE LEFT KIDNEY MEASURING UP TO 6.9 CM IN   DIAMETER. THIS MAY BE A CLASS IIF CYST. NO DEFINITE SOURCE FOR THE   PATIENT'S HEMATURIA IS IDENTIFIED. AS A NEXT STEP, THIS COULD BE FURTHER   CHARACTERIZED WITH A RENAL PROTOCOL CT WITHOUT AND WITH CONTRAST TO ASSESS   FOR ANY SOLID ENHANCEMENT. Interpreting/Reading Doctor: Coco Rocha ([id number])  Transcribed: TLS on 02/15/2008  Approved: Coco Rocha ([id number])  02/15/2008             Distribution:  Attending Doctor: Yanique Genao Results (maximum last 3): No results found for this or any previous visit. LEXX Results (maximum last 3): No results found for this or any previous visit. IR Results (maximum last 3): No results found for this or any previous visit. VAS/US Results (maximum last 3): No results found for this or any previous visit. PET Results (maximum last 3): No results found for this or any previous visit. EKG Results     Procedure 720 Value Units Date/Time    EKG 12 LEAD INITIAL [[id number]] Collected: 02/21/21 0915    Order Status: Completed Updated: 02/21/21 1649     Ventricular Rate 94 BPM      Atrial Rate 94 BPM      P-R Interval 146 ms      QRS Duration 74 ms      Q-T Interval 322 ms      QTC Calculation (Bezet) 402 ms      Calculated P Axis 50 degrees      Calculated R Axis 27 degrees      Calculated T Axis 25 degrees      Diagnosis --     !! AGE AND GENDER SPECIFIC ECG ANALYSIS !!   Normal sinus rhythm  Normal ECG  When compared with ECG of 18-FEB-2021 19:10,  No significant change was found  Confirmed by ST KACY RIZZO MD (), EDWARD SYLVESTER ([id number]) on 2/21/2021 4:49:02 PM            Active Problems:    Patient Active Problem List    Diagnosis Date Noted    Respiratory failure with hypoxia ([address] 02/21/2021    COVID-19 02/21/2021    Fall 12/11/2018    Acute left-sided low back pain without sciatica 12/11/2018    Contusion of left knee 12/11/2018    Abnormal ultrasound of liver 07/18/2018    Acute gastritis 07/18/2018    Gastroesophageal reflux disease without esophagitis 07/18/2018    Internal hemorrhoids 07/18/2018    IBS (irritable bowel syndrome) 07/18/2018    Elevated alkaline phosphatase level 07/18/2018    Testosterone deficiency 03/02/2018    Kidney stones 11/17/2017    Anxiety disorder 04/21/2017    Mixed hyperlipidemia 11/18/2015    Chest discomfort 11/18/2015    Dyspnea 11/18/2015    Dyslipidemia 09/02/2015         Hortensia Pelayo MD  [facility]  2/21/2021 11:12 AM

## 2021-02-22 NOTE — PROGRESS NOTES
Follow-up phone call to convey care and concern and explore spiritual/emotional concerns.      Jose Grimaldo 70  Board Certified

## 2021-02-23 ENCOUNTER — APPOINTMENT (OUTPATIENT)
Dept: ULTRASOUND IMAGING | Age: 61
DRG: 177 | End: 2021-02-23
Attending: INTERNAL MEDICINE
Payer: COMMERCIAL

## 2021-02-23 LAB
ALBUMIN SERPL-MCNC: 2.7 G/DL (ref 3.5–5)
ALBUMIN/GLOB SERPL: 0.8 {RATIO} (ref 1.2–3.5)
ALP SERPL-CCNC: 88 U/L (ref 50–136)
ALT SERPL-CCNC: 28 U/L (ref 12–65)
ANION GAP SERPL CALC-SCNC: 6 MMOL/L (ref 7–16)
AST SERPL-CCNC: 28 U/L (ref 15–37)
BASOPHILS # BLD: 0 K/UL (ref 0–0.2)
BASOPHILS NFR BLD: 0 % (ref 0–2)
BILIRUB SERPL-MCNC: 0.4 MG/DL (ref 0.2–1.1)
BLD PROD TYP BPU: NORMAL
BLOOD BANK CMNT PATIENT-IMP: NORMAL
BPU ID: NORMAL
BUN SERPL-MCNC: 23 MG/DL (ref 6–23)
CALCIUM SERPL-MCNC: 8.4 MG/DL (ref 8.3–10.4)
CHLORIDE SERPL-SCNC: 110 MMOL/L (ref 98–107)
CO2 SERPL-SCNC: 27 MMOL/L (ref 21–32)
CREAT SERPL-MCNC: 0.79 MG/DL (ref 0.8–1.5)
D DIMER PPP FEU-MCNC: 0.53 UG/ML(FEU)
DIFFERENTIAL METHOD BLD: ABNORMAL
EOSINOPHIL # BLD: 0 K/UL (ref 0–0.8)
EOSINOPHIL NFR BLD: 0 % (ref 0.5–7.8)
ERYTHROCYTE [DISTWIDTH] IN BLOOD BY AUTOMATED COUNT: 12 % (ref 11.9–14.6)
GLOBULIN SER CALC-MCNC: 3.5 G/DL (ref 2.3–3.5)
GLUCOSE SERPL-MCNC: 120 MG/DL (ref 65–100)
HCT VFR BLD AUTO: 39.9 % (ref 41.1–50.3)
HGB BLD-MCNC: 13.6 G/DL (ref 13.6–17.2)
IMM GRANULOCYTES # BLD AUTO: 0.1 K/UL (ref 0–0.5)
IMM GRANULOCYTES NFR BLD AUTO: 1 % (ref 0–5)
LYMPHOCYTES # BLD: 0.6 K/UL (ref 0.5–4.6)
LYMPHOCYTES NFR BLD: 7 % (ref 13–44)
MCH RBC QN AUTO: 30.2 PG (ref 26.1–32.9)
MCHC RBC AUTO-ENTMCNC: 34.1 G/DL (ref 31.4–35)
MCV RBC AUTO: 88.5 FL (ref 79.6–97.8)
MONOCYTES # BLD: 0.8 K/UL (ref 0.1–1.3)
MONOCYTES NFR BLD: 8 % (ref 4–12)
NEUTS SEG # BLD: 8.3 K/UL (ref 1.7–8.2)
NEUTS SEG NFR BLD: 84 % (ref 43–78)
NRBC # BLD: 0 K/UL (ref 0–0.2)
PLATELET # BLD AUTO: 246 K/UL (ref 150–450)
PMV BLD AUTO: 9.8 FL (ref 9.4–12.3)
POTASSIUM SERPL-SCNC: 3.6 MMOL/L (ref 3.5–5.1)
PROT SERPL-MCNC: 6.2 G/DL (ref 6.3–8.2)
RBC # BLD AUTO: 4.51 M/UL (ref 4.23–5.6)
SODIUM SERPL-SCNC: 143 MMOL/L (ref 138–145)
STATUS OF UNIT,%ST: NORMAL
UNIT DIVISION, %UDIV: NORMAL
WBC # BLD AUTO: 9.9 K/UL (ref 4.3–11.1)

## 2021-02-23 PROCEDURE — 85379 FIBRIN DEGRADATION QUANT: CPT

## 2021-02-23 PROCEDURE — 94640 AIRWAY INHALATION TREATMENT: CPT

## 2021-02-23 PROCEDURE — 80053 COMPREHEN METABOLIC PANEL: CPT

## 2021-02-23 PROCEDURE — 94760 N-INVAS EAR/PLS OXIMETRY 1: CPT

## 2021-02-23 PROCEDURE — 74011000258 HC RX REV CODE- 258: Performed by: INTERNAL MEDICINE

## 2021-02-23 PROCEDURE — 77010033711 HC HIGH FLOW OXYGEN

## 2021-02-23 PROCEDURE — 74011000250 HC RX REV CODE- 250: Performed by: INTERNAL MEDICINE

## 2021-02-23 PROCEDURE — 65270000029 HC RM PRIVATE

## 2021-02-23 PROCEDURE — 74011250636 HC RX REV CODE- 250/636: Performed by: INTERNAL MEDICINE

## 2021-02-23 PROCEDURE — 76770 US EXAM ABDO BACK WALL COMP: CPT

## 2021-02-23 PROCEDURE — 85025 COMPLETE CBC W/AUTO DIFF WBC: CPT

## 2021-02-23 PROCEDURE — 94762 N-INVAS EAR/PLS OXIMTRY CONT: CPT

## 2021-02-23 PROCEDURE — 74011250637 HC RX REV CODE- 250/637: Performed by: INTERNAL MEDICINE

## 2021-02-23 RX ORDER — FUROSEMIDE 10 MG/ML
20 INJECTION INTRAMUSCULAR; INTRAVENOUS ONCE
Status: COMPLETED | OUTPATIENT
Start: 2021-02-23 | End: 2021-02-23

## 2021-02-23 RX ADMIN — OXYCODONE HYDROCHLORIDE AND ACETAMINOPHEN 1000 MG: 500 TABLET ORAL at 16:33

## 2021-02-23 RX ADMIN — ALBUTEROL SULFATE 2 PUFF: 108 INHALANT RESPIRATORY (INHALATION) at 20:00

## 2021-02-23 RX ADMIN — ALBUTEROL SULFATE 2 PUFF: 108 INHALANT RESPIRATORY (INHALATION) at 15:15

## 2021-02-23 RX ADMIN — FUROSEMIDE 20 MG: 10 INJECTION, SOLUTION INTRAMUSCULAR; INTRAVENOUS at 16:32

## 2021-02-23 RX ADMIN — LORAZEPAM 1 MG: 1 TABLET ORAL at 08:22

## 2021-02-23 RX ADMIN — OXYCODONE HYDROCHLORIDE AND ACETAMINOPHEN 1000 MG: 500 TABLET ORAL at 08:22

## 2021-02-23 RX ADMIN — ENOXAPARIN SODIUM 30 MG: 100 INJECTION SUBCUTANEOUS at 23:29

## 2021-02-23 RX ADMIN — VITAMIN D, TAB 1000IU (100/BT) 2000 UNITS: 25 TAB at 08:22

## 2021-02-23 RX ADMIN — DEXAMETHASONE SODIUM PHOSPHATE 6 MG: 10 INJECTION INTRAMUSCULAR; INTRAVENOUS at 12:21

## 2021-02-23 RX ADMIN — AMITRIPTYLINE HYDROCHLORIDE 10 MG: 10 TABLET, FILM COATED ORAL at 22:48

## 2021-02-23 RX ADMIN — FAMOTIDINE 20 MG: 20 TABLET ORAL at 08:22

## 2021-02-23 RX ADMIN — ONDANSETRON 4 MG: 2 INJECTION INTRAMUSCULAR; INTRAVENOUS at 22:47

## 2021-02-23 RX ADMIN — ALBUTEROL SULFATE 2 PUFF: 108 INHALANT RESPIRATORY (INHALATION) at 02:30

## 2021-02-23 RX ADMIN — LORAZEPAM 1 MG: 1 TABLET ORAL at 22:48

## 2021-02-23 RX ADMIN — FAMOTIDINE 20 MG: 20 TABLET ORAL at 16:33

## 2021-02-23 RX ADMIN — REMDESIVIR 100 MG: 100 INJECTION, POWDER, LYOPHILIZED, FOR SOLUTION INTRAVENOUS at 16:35

## 2021-02-23 RX ADMIN — ZINC SULFATE 220 MG (50 MG) CAPSULE 1 CAPSULE: CAPSULE at 08:22

## 2021-02-23 RX ADMIN — MONTELUKAST 10 MG: 10 TABLET, FILM COATED ORAL at 08:22

## 2021-02-23 RX ADMIN — ALBUTEROL SULFATE 2 PUFF: 108 INHALANT RESPIRATORY (INHALATION) at 07:21

## 2021-02-23 RX ADMIN — ENOXAPARIN SODIUM 30 MG: 100 INJECTION SUBCUTANEOUS at 12:21

## 2021-02-23 RX ADMIN — ONDANSETRON 4 MG: 2 INJECTION INTRAMUSCULAR; INTRAVENOUS at 04:17

## 2021-02-23 NOTE — PROGRESS NOTES
[facility]  History and Physical    Patient ID:  Diomedes Kingston  male  1960  [id number]      Admission Date: 2/21/2021  Chief Complaint: Worsening shortness of breath   Reason for Admission: Acute hypoxic respiratory failure secondary to COVID-19    ASSESSMENT & PLAN:    Acute hypoxic respiratory failure- the patient initially presented with sats of 88 to 84% on room air per ER staff.  He was placed on 4 L of oxygen and satting 95%.  But decompensated overnight on February 21, 2021 and had to be placed on Airvo.  We will continue [place] and wean as tolerated.  He is actively proning and doing incentive spirometry.  We will give 1 dose of Lasix      COVID-19 pneumonia-    -Continue steroids and remdesivir.  He is status post convalescent plasma x1  -Continue to encourage proning    Concern for possible superimposed bacterial pneumonia-  - Continue ceftriaxone and doxycycline      Recurrent episodes of hypoglycemia at baseline for years per the patient -  -continue to monitor blood sugars and cover with insulin if needed    Renal mass noted on ct chest- Renal ultrasound ordered.  The patient states that the mass is not new and he is known about it for about 15 years.  Wants to have ultrasound.      Continue other current medical management   further work-up and management based on his clinical course this    Disposition: Continue on the COVID-19 unit for now.  likely to home on discharge  Diet: Regular  VTE ppx: Lovenox  GI ppx: Pepcid  CODE STATUS: Full  Surrogate decision-maker: Homar Brunson [phone]    HISTORY OF PRESENT ILLNESS:  Patient is a 60 y.o. male with medical h/o anxiety and chronic gastric who was seen in the ER on February 18, 2021 and diagnosed with COVID-19 pneumonia.  He was discharged to home but per him he continued to get progressively worse and short of breath.  Per the patient he denies entire family are sick with the COVID-19.  They did have a gathering for the  [employer] and this is where he thinks he may have gotten infected. In the ER the patient was found to have O2 sats of 80 to 84% on room air. He was placed on 4 L of oxygen as above. And the hospitalist were asked to admit him for further management and treatment. The patient is very anxious. He is short of breath and nausea. But he denies any fevers, chills, diarrhea or burning or pain on micturition. He has had worsening of his baseline nausea and vomiting over the last several days with the COVID-19. [facility] and has had extensive work-up in the past for the nausea and vomiting as well as general dyspeptic symptoms with no etiology found. He states that he does run in his family and they called it the [place] curse. 2021- he had to be placed on Airvo overnight. He still has not proned. Denies any chest pain fevers chills nausea \"of breath. Feels like the dyspnea comes and goes. Afebrile overnight. Allergies   Allergen Reactions    Iohexol Diarrhea     Other reaction(s): Abdominal pain-I    Penicillin G Swelling       Prior to Admission Medications   Prescriptions Last Dose Informant Patient Reported? Taking? LORazepam (ATIVAN) 1 mg tablet   No No   Sig: Take 1 Tab by mouth every eight (8) hours as needed for Anxiety. Max Daily Amount: 3 mg. Lactobacillus acidophilus 10 billion cell cap   Yes No   Si Billion One capsule daily   amitriptyline (ELAVIL) 10 mg tablet   Yes No   Sig: TAKE 1 TABLET BY MOUTH EVERYDAY AT BEDTIME   benzonatate (Tessalon Perles) 100 mg capsule   No No   Sig: Take 1 Cap by mouth three (3) times daily as needed for Cough for up to 7 days. famotidine (PEPCID) 20 mg tablet   Yes No   Sig: Take 20 mg by mouth two (2) times a day. guaiFENesin-codeine (GUAIFENESIN AC) 100-10 mg/5 mL solution   No No   Sig: Take 5 mL by mouth three (3) times daily as needed for Cough. Max Daily Amount: 15 mL. hyoscyamine SL (LEVSIN/SL) 0.125 mg SL tablet   Yes No   Sig: TAKE 1 TABLET BY SUBLINGUAL ROUTE 4 TIMES EVERY DAY AS NEEDED FOR PAIN/CRAMPING   montelukast (SINGULAIR) 10 mg tablet   No No   Sig: Take 1 Tab by mouth daily. naproxen (NAPROSYN) 500 mg tablet   No No   Sig: Take 1 Tab by mouth two (2) times daily (with meals). omeprazole (PRILOSEC) 40 mg capsule   Yes No   Sig: Take 40 mg by mouth daily. ondansetron (ZOFRAN ODT) 8 mg disintegrating tablet   No No   Sig: Take 1 Tab by mouth every eight (8) hours as needed for Nausea. tiZANidine (ZANAFLEX) 4 mg tablet   No No   Sig: Take 1 Tab by mouth three (3) times daily as needed. zolpidem (AMBIEN) 5 mg tablet   No No   Sig: Take 1 Tab by mouth nightly as needed for Sleep. Max Daily Amount: 5 mg. Facility-Administered Medications: None       Diagnosis Date    Chest discomfort 11/18/2015    Tightness, pressure     Chronic left maxillary sinusitis     Depression     Dyspnea 11/18/2015    Shortness of breath      GERD (gastroesophageal reflux disease)     controlled with meds     Hypoglycemia     Mixed hyperlipidemia 11/18/2015   Past Medical History:   Diagnosis Date    Chest discomfort 11/18/2015    Tightness, pressure     Chronic left maxillary sinusitis     Depression     Dyspnea 11/18/2015    Shortness of breath      GERD (gastroesophageal reflux disease)     controlled with meds     Hypoglycemia     Mixed hyperlipidemia 11/18/2015      Procedure Laterality Date    COLONOSCOPY      HX HEENT Left 12/10/2020    FESS w/ L max antrostomy/ant ethmoidectomy- Marco Menendez         Social History     Tobacco Use    Smoking status: Never Smoker    Smokeless tobacco: Never Used   Substance Use Topics    Alcohol use: No       FAMILY HISTORY: Mother suffers from diabetes hypertension.   Family History   Problem Relation Age of Onset    Heart Attack Mother 66    High Cholesterol Father        REVIEW OF SYSTEMS:  A 14 point review of systems was taken and pertinent positive as per HPI. PHYSICAL EXAM:    Visit Vitals  /73 (BP 1 Location: Right upper arm, BP Patient Position: At rest;Supine)   Pulse 86   Temp 98.8 °F (37.1 °C)   Resp 18   Ht 5' 8\" (1.727 m)   Wt 83.9 kg (185 lb)   SpO2 90%   BMI 28.13 kg/m²       General: Middle-aged gentleman in obvious distress secondary to shortness of breath and anxiety. He is not speaking in full sentences but no use of accessory muscles of respiration today. Appears ill. HEENT: Pupils equal and reactive to light and accommodation, oropharynx is clear   Neck: Supple, no lymphadenopathy, no JVD     Lungs: Equal to auscultation bilaterally.   Air entry equal bilaterally and diminished in the bases   Cardiovascular: Regular rate and rhythm with normal S1 and S2     Abdomen: Soft, nontender, nondistended, normoactive bowel sounds   Extremities: No cyanosis clubbing or edema     Neuro: Nonfocal, A&O x3   Psych: Normal mood and affect     Intake/Output last 3 shifts:    Date 02/20/21 1900 - 02/21/21 0659(Not Admitted) 02/21/21 0700 - 02/22/21 0659   Shift 8896-9081 24 Hour Total 9915-8734 7468-6970 24 Hour Total   INTAKE   P.O.   114  114     P.O.   114  114   I.V.   1050(1)  1050     Volume (sodium chloride 0.9 % bolus infusion 1,000 mL)   1000  1000     Volume (cefTRIAXone (ROCEPHIN) 2 g in 0.9% sodium chloride (MBP/ADV) 50 mL MBP)   50  50   Shift Total(mL/kg)   2671(19.5)  8023(17.8)   OUTPUT   Urine   250(0.2)  250     Urine Voided   250  250   Shift Total(mL/kg)   250(3)  250(3)   NET   914  914   Weight (kg)   83.9 83.9 83.9         Labs:  CMP:   Lab Results   Component Value Date/Time     02/21/2021 09:21 AM    K 3.4 (L) 02/21/2021 09:21 AM     02/21/2021 09:21 AM    CO2 27 02/21/2021 09:21 AM    AGAP 10 02/21/2021 09:21 AM     (H) 02/21/2021 09:21 AM    BUN 12 02/21/2021 09:21 AM    CREA 1.16 02/21/2021 09:21 AM    GFRAA >60 02/21/2021 09:21 AM    GFRNA >60 02/21/2021 09:21 AM CA 9.0 02/21/2021 09:21 AM    ALB 3.2 (L) 02/21/2021 09:21 AM    TBILI 0.4 02/21/2021 09:21 AM    TP 7.5 02/21/2021 09:21 AM    GLOB 4.3 (H) 02/21/2021 09:21 AM    AGRAT 0.7 (L) 02/21/2021 09:21 AM    ALT 28 02/21/2021 09:21 AM         CBC:    Lab Results   Component Value Date/Time    WBC 6.3 02/21/2021 09:21 AM    HGB 15.8 02/21/2021 09:21 AM    HCT 46.9 02/21/2021 09:21 AM     02/21/2021 09:21 AM       No results found for: INR, PTMR, PTP, PT1, PT2, INREXT, INREXT    ABG:  No results found for: PH, PHI, PCO2, PCO2I, PO2, PO2I, HCO3, HCO3I, FIO2, FIO2I        Lab Results   Component Value Date/Time    Troponin-I, Qt. <0.02 (L) 10/05/2019 01:48 PM         Imaging & Other Studies:    XR Results (maximum last 3): Results from Hospital Encounter encounter on 02/21/21   XR CHEST PORT    Narrative Portable chest xray      Comparison: 2/18/2021 and prior    Indication: Shortness of breath    Findings: Mild cardiac enlargement. Increased mild perihilar pulmonary  infiltrates without consolidation, effusion, pneumothorax. No discrete osseous  abnormality on the single view. Impression Increased pulmonary infiltrates without consolidation. FINDINGS can  be seen with volume overload and/or infection. Results from [place] encounter on 02/18/21   XR CHEST PORT    Narrative PA CHEST X-RAY. Clinical Indication: Covid 19 positive , shortness of breath, cough, fever    Comparison: Chest x-ray dated 10/5/2019    Findings: Single view of the chest submitted demonstrate the cardiac silhouette  and mediastinum to be unremarkable. There is no pleural effusion or  pneumothorax. The lungs are underexpanded bilaterally. No definite airspace  consolidation or infiltrate. Impression Underexpanded lungs without definite acute abnormality.    Results from [facility] encounter on 10/05/19   XR CHEST PA LAT    Narrative Chest X-ray    INDICATION: Right chest pain    PA and lateral views of the chest were obtained. FINDINGS: The lungs are clear. There are no infiltrates or effusions. The heart  size is normal.  The bony thorax is intact. Impression IMPRESSION: No acute findings in the chest         CT Results (maximum last 3): Results from [place] [facility] encounter on 04/26/18   CT CARDIAC OVER READ    Narrative CT CORONARY ANGIOGRAPHY WITH CONTRAST. HISTORY: Chest pain. TECHNIQUE: 2.5 mm noncontrast axial scans with small field-of-view centered  about the heart were followed by 119 cc contrast intravenously and 0.625 mm  axial scans through the heart. FINDINGS: Please see cardiologist's report regarding the coronary arteries. Within the included portion of the thorax and abdomen: No pulmonary nodules, air  space disease or pleural effusion. No evidence of aortic dissection. No gross  bony lesions. Pulmonary arteries are grossly unremarkable. Multiple  low-attenuation foci scattered throughout the liver consistent with simple  cysts. No gross bony lesions. Impression IMPRESSION: Simple liver cysts, otherwise unremarkable. CT CORONARY ART W CA+ W EF    Narrative CT Coronary Arteries with Calcium 4/26/2018    INDICATION: 57-year-old male referred for cardiac CTA with a history of chest  pain and negative stress test in the past.    PROCEDURE: A 64 slice CT coronary angiogram procedure was performed. 110 mL of  Optiray 350 were administered intravenously followed by axial imaging through  the chest with a standard cardiac reconstruction protocol. Patient was given  oral beta blockers prior to the procedure. The average heart rate was 62 beats  per minute. The calcium score was calculated and left ventricular function was  not assessed. Technical Quality: Fair quality with some misregistration artifact noted to the  coronary arteries    INTERPRETATION:     Coronary Calcium Score: 0     Coronary Angiography:    1.  The left main originates from the left cusp and divides into the left  anterior descending circumflex artery appears normal   2. The left anterior descending large vessel coursing the apex. It appears  normal throughout its very large caliber in the proximal segment. There is no  calcium identified. A some misregistration artifact in the mid vessel which cuts  off and I believe overall it is normal. The distal LAD at the apex is normal  large diagonal branches appear normal  3. The left circumflex large vessel which appears normal. Provides a fairly  large obtuse marginal branch which appears normal   4. The dominant right coronary artery dominant right coronary artery which  appears normal.    Left Ventricle: Normal size and thickness   Left Atrium: Normal size left atrium with the normal appendage and pulmonary  veins. Right Ventricle: Normal size right ventricle   Right Atrium: Normal size  Pericardium: No pericardial effusion  Aorta: Normal caliber aorta with no calcium identified. Pulmonary Artery: Normal   Valves: Grossly normal-appearing valves   Mediastinum / Lung Fields: Normal. Lung fields. There are some cysts in the  liver. This portion be over read by radiology. Impression IMPRESSION:  1. Coronary Calcium Score 0    2. Normal appearing coronary arteries. 3. Normal chamber dimensions. Results from [facility] encounter on 17   CT ABD PELV W CONT       MRI Results (maximum last 3):   Results from [facility] encounter on 10/14/08   MRI LUMBAR SPINE W/O CONT    Narrative [address]                                                            [facility]          NAME: Evelin Castellanos                                                           PT : 1960               SEX: M         MR#: [id number]     LOCATION/NS: MR-                 AGE: 47Y      ACCT: [de-identified]     ORDERING: Raisa Palacios PT TYPE: O                         RADIOLOGIST: Candy Thomas ([id number])  Final Report       ICD Codes / Adm. Diagnosis:                  /     Examination:  MRI LUMBAR SPINE WO  - [id number] - Oct 14 2008  8:27AM       MRI LUMBAR SPINE WITHOUT CONTRAST 10/14/08     Reason:   41year-old with bilateral lower extremity pain, no known injury. No history of prior surgery. Patient has a known sebaceous cyst in the back. REPORT:      TECHNIQUE:  Sagittal T1, T2, STIR, axial T1 and T2-weighted sequences are   available for review. COMPARISON: None available. FINDINGS: There is degenerative disc signal present at L5-S1. There is   preservation of vertebral body height throughout. There is normal alignment   present. The remaining discs are normal in signal intensity. The conus is   normal in configuration and signal intensity throughout. Incompletely   evaluated, as seen on the  sequence, there are multiple bilateral renal   cysts. These measure up to 8 cm. There is a subcutaneous cystic structure   present which measures 12 x 26 x 25 mm. L2-3:  No significant disc herniation, neural foraminal narrowing or spinal   canal stenosis. L3-4:  No disc herniation, neural foraminal narrowing or spinal canal   stenosis. L4-5:  No significant disc herniation, neural foraminal narrowing or spinal   canal stenosis. L5-S1:  There is a circumferential disc bulge at this level with loss of   disc height and degenerative disc signal.  There is no significant mass   effect upon the descending or exiting nerve roots. IMPRESSION:      1. THERE IS DEGENERATIVE DISC DISEASE AT L5-S1 WITH A CIRCUMFERENTIAL DISC   BULGE, LOSS OF DISC HEIGHT AND LOSS OF DISC SIGNAL. 2. MULTIPLE BILATERAL RENAL CYSTS ARE PRESENT. 3. PROBABLE SEBACEOUS CYST.      Interpreting/Reading Doctor: Candy Thomas ([id number])  Transcribed: TLS on 10/14/2008  Approved: Candy Thomas ([id number])  10/14/2008             Distribution: Attending Doctor: Michelle Yates               Nuclear Medicine Results (maximum last 3): No results found for this or any previous visit. US Results (maximum last 3): Results from [facility] encounter on 12/17/10   [place] [address]                    [address] [facility]                                            Kathryn Tijerina [facility]                                                            ULTRASOUND                          NAME: Dave Caicedo                                                           PT : 1960               SEX: M         MR#: [id number]     LOCATION/NS: US-                 AGE: [address]      ACCT: [de-identified]     ORDERING: Michelle Yates                    PT TYPE: Nicho Lowe                         RADIOLOGISTGaKinderhook Ye ([id number])  Final Report       ICD Codes / Adm. Diagnosis:    /     Examination:  ABDOMINAL ULTRASOUND  - [id number] - Dec 17 2010  8:00AM    Reason:  ABD PAIN    REPORT:  Abdominal Ultrasound    INDICATION:  Abdominal pain    FINDINGS: There are multiple cysts in the liver measuring up to 2 cm in   size. No definite solid liver masses seen. There are no lesions visualized   portions of the spleen or pancreas. . There is no bile duct dilatation. The   common bile duct measures 5 mm. There several fixed echogenic polyps in the   gallbladder. The major 6 mm in size. There is no wall thickening of   gallbladder distention. .    The right kidney measures 10.9 cm in length. The left kidney measures 11.7   cm. There is no hydronephrosis. There are complex cyst in both kidneys. The   cyst in the left kidney measures 7.6 cm in size. The cyst in the right   kidney measures 5.4 cm. The cysts were noted on prior ultrasound 2008   and are relatively stable in size. . There is no ascites. The aorta and   inferior vena cava are normal in caliber. IMPRESSION:   1. Stable small gallbladder polyps.   2. Stable cysts in the liver and kidneys        Interpreting/Reading Doctor: Terell Arechiga ([id number])  Transcribed:  on 2010  Approved: Terell rAechiga ([id number])  2010             Distribution:  Attending Doctor: Francisco Powell           Results from Hospital Encounter encounter on 08   [address]                                            [address] [place] [address] [facility]                                                            ULTRASOUND                          NAME: Connie Salas                                                           PT : 1960               SEX: M         MR#: [id number]     LOCATION/NS: CT--                AGE: 47Y      ACCT: [de-identified]     ORDERING: Yesica Joshua FNP              PT TYPE: Britney Villa ([id number])               ICD Codes / Adm. Diagnosis:                  /     Examination:  ABDOMINAL ULTRASOUND  - [id number] - 2008 12:46PM       ABDOMINAL ULTRASOUND, 08:    Reason:   Renal and hepatic cysts demonstrated on the CT. TECHNIQUE:  Sonographic images of the abdomen were obtained. REPORT:       Multiple simple cysts are present throughout the liver,   measuring up to 1.8 cm in diameter in the left hepatic lobe. No solid   hepatic masses or biliary ductal dilatation is present. The pancreas is   normal in appearance. A few small polyps are present in the gallbladder. The common bile duct is 4.1 mm in diameter. The right kidney is 12.0 cm in   length. The right kidney contains a 4.7 cm simple cyst.    The left kidney contains a multicystic mass measuring 7.5 x 6.8 cm in   diameter. The septations are thin and linear and no solid nodular areas are   present. This may be a complex cyst.  The spleen is 10.0 cm in length. IMPRESSION:    1. MULTIPLE HEPATIC CYSTS. 2.     MULTI-SEPTATED CYST IN THE LEFT KIDNEY MEASURING UP TO 6.9 CM IN   DIAMETER. THIS MAY BE A CLASS IIF CYST. NO DEFINITE SOURCE FOR THE   PATIENT'S HEMATURIA IS IDENTIFIED. AS A NEXT STEP, THIS COULD BE FURTHER   CHARACTERIZED WITH A RENAL PROTOCOL CT WITHOUT AND WITH CONTRAST TO ASSESS   FOR ANY SOLID ENHANCEMENT. Interpreting/Reading Doctor: Brennan Walker ([id number])  Transcribed: TLS on 02/15/2008  Approved: Brennan Walker ([id number])  02/15/2008             Distribution:  Attending Doctor: Seble Zimmerman Results (maximum last 3): No results found for this or any previous visit. LEXX Results (maximum last 3): No results found for this or any previous visit. IR Results (maximum last 3): No results found for this or any previous visit. VAS/US Results (maximum last 3): No results found for this or any previous visit. PET Results (maximum last 3): No results found for this or any previous visit. EKG Results     Procedure 720 Value Units Date/Time    EKG 12 LEAD INITIAL [[id number]] Collected: 02/21/21 0915    Order Status: Completed Updated: 02/21/21 1649     Ventricular Rate 94 BPM      Atrial Rate 94 BPM      P-R Interval 146 ms      QRS Duration 74 ms      Q-T Interval 322 ms      QTC Calculation (Bezet) 402 ms      Calculated P Axis 50 degrees      Calculated R Axis 27 degrees      Calculated T Axis 25 degrees      Diagnosis --     !! AGE AND GENDER SPECIFIC ECG ANALYSIS !!   Normal sinus rhythm  Normal ECG  When compared with ECG of 18-FEB-2021 19:10,  No significant change was found  Confirmed by ST KACY RIZZO MD (), EDWARD SYLVESTER ([id number]) on 2/21/2021 4:49:02 PM            Active Problems:    Patient Active Problem List    Diagnosis Date Noted    Respiratory failure with hypoxia ([address] 02/21/2021    COVID-19 02/21/2021    Fall 12/11/2018    Acute left-sided low back pain without sciatica 12/11/2018    Contusion of left knee 12/11/2018    Abnormal ultrasound of liver 07/18/2018    Acute gastritis 07/18/2018    Gastroesophageal reflux disease without esophagitis 07/18/2018    Internal hemorrhoids 07/18/2018    IBS (irritable bowel syndrome) 07/18/2018    Elevated alkaline phosphatase level 07/18/2018    Testosterone deficiency 03/02/2018    Kidney stones 11/17/2017    Anxiety disorder 04/21/2017    Mixed hyperlipidemia 11/18/2015    Chest discomfort 11/18/2015    Dyspnea 11/18/2015    Dyslipidemia 09/02/2015         Negro Chávez MD  [facility]  2/21/2021 11:12 AM clear

## 2021-02-23 NOTE — PROGRESS NOTES
Shift Assessment. Pt is A&O x 4. Pt cardiac rhythm is regular. Pt lungs are diminished. Pt is on airvo 40L at 60%. Bowel sounds present. Pt denies nausea. Pt states he has pain. See MAR for pain intervention. Pt is resting in bed with the bed in the lowest position and the call light within reach.

## 2021-02-23 NOTE — ROUTINE PROCESS
Am assessment completed. Verbalizes needs well. Up with assist. Has been very nauseated. Had zofran earlier and has also received an Ativan. Continue to monitor.

## 2021-02-24 LAB
ALBUMIN SERPL-MCNC: 2.4 G/DL (ref 3.5–5)
ALBUMIN/GLOB SERPL: 0.6 {RATIO} (ref 1.2–3.5)
ALP SERPL-CCNC: 89 U/L (ref 50–136)
ALT SERPL-CCNC: 31 U/L (ref 12–65)
ANION GAP SERPL CALC-SCNC: 9 MMOL/L (ref 7–16)
AST SERPL-CCNC: 28 U/L (ref 15–37)
BILIRUB SERPL-MCNC: 0.4 MG/DL (ref 0.2–1.1)
BUN SERPL-MCNC: 25 MG/DL (ref 6–23)
CALCIUM SERPL-MCNC: 8.4 MG/DL (ref 8.3–10.4)
CHLORIDE SERPL-SCNC: 110 MMOL/L (ref 98–107)
CO2 SERPL-SCNC: 26 MMOL/L (ref 21–32)
CREAT SERPL-MCNC: 0.81 MG/DL (ref 0.8–1.5)
D DIMER PPP FEU-MCNC: 0.49 UG/ML(FEU)
GLOBULIN SER CALC-MCNC: 3.8 G/DL (ref 2.3–3.5)
GLUCOSE SERPL-MCNC: 106 MG/DL (ref 65–100)
POTASSIUM SERPL-SCNC: 3.8 MMOL/L (ref 3.5–5.1)
PROT SERPL-MCNC: 6.2 G/DL (ref 6.3–8.2)
SODIUM SERPL-SCNC: 145 MMOL/L (ref 136–145)

## 2021-02-24 PROCEDURE — 74011000258 HC RX REV CODE- 258: Performed by: INTERNAL MEDICINE

## 2021-02-24 PROCEDURE — 94640 AIRWAY INHALATION TREATMENT: CPT

## 2021-02-24 PROCEDURE — 77010033711 HC HIGH FLOW OXYGEN

## 2021-02-24 PROCEDURE — 74011250636 HC RX REV CODE- 250/636: Performed by: INTERNAL MEDICINE

## 2021-02-24 PROCEDURE — 94762 N-INVAS EAR/PLS OXIMTRY CONT: CPT

## 2021-02-24 PROCEDURE — 80053 COMPREHEN METABOLIC PANEL: CPT

## 2021-02-24 PROCEDURE — 74011250637 HC RX REV CODE- 250/637: Performed by: INTERNAL MEDICINE

## 2021-02-24 PROCEDURE — 74011000250 HC RX REV CODE- 250: Performed by: INTERNAL MEDICINE

## 2021-02-24 PROCEDURE — 65270000029 HC RM PRIVATE

## 2021-02-24 PROCEDURE — 85379 FIBRIN DEGRADATION QUANT: CPT

## 2021-02-24 RX ORDER — FUROSEMIDE 10 MG/ML
40 INJECTION INTRAMUSCULAR; INTRAVENOUS ONCE
Status: COMPLETED | OUTPATIENT
Start: 2021-02-24 | End: 2021-02-24

## 2021-02-24 RX ORDER — POTASSIUM CHLORIDE 20 MEQ/1
40 TABLET, EXTENDED RELEASE ORAL ONCE
Status: COMPLETED | OUTPATIENT
Start: 2021-02-24 | End: 2021-02-24

## 2021-02-24 RX ORDER — LORAZEPAM 2 MG/ML
1 INJECTION INTRAMUSCULAR ONCE
Status: COMPLETED | OUTPATIENT
Start: 2021-02-24 | End: 2021-02-24

## 2021-02-24 RX ORDER — LORAZEPAM 1 MG/1
1 TABLET ORAL
Status: DISCONTINUED | OUTPATIENT
Start: 2021-02-24 | End: 2021-03-12 | Stop reason: HOSPADM

## 2021-02-24 RX ADMIN — FAMOTIDINE 20 MG: 20 TABLET ORAL at 16:37

## 2021-02-24 RX ADMIN — REMDESIVIR 100 MG: 100 INJECTION, POWDER, LYOPHILIZED, FOR SOLUTION INTRAVENOUS at 18:12

## 2021-02-24 RX ADMIN — OXYCODONE HYDROCHLORIDE AND ACETAMINOPHEN 1000 MG: 500 TABLET ORAL at 08:01

## 2021-02-24 RX ADMIN — LORAZEPAM 1 MG: 1 TABLET ORAL at 08:00

## 2021-02-24 RX ADMIN — ONDANSETRON 4 MG: 2 INJECTION INTRAMUSCULAR; INTRAVENOUS at 08:00

## 2021-02-24 RX ADMIN — ALBUTEROL SULFATE 2 PUFF: 108 INHALANT RESPIRATORY (INHALATION) at 08:30

## 2021-02-24 RX ADMIN — FUROSEMIDE 40 MG: 10 INJECTION, SOLUTION INTRAMUSCULAR; INTRAVENOUS at 08:00

## 2021-02-24 RX ADMIN — AMITRIPTYLINE HYDROCHLORIDE 10 MG: 10 TABLET, FILM COATED ORAL at 21:23

## 2021-02-24 RX ADMIN — DEXAMETHASONE SODIUM PHOSPHATE 6 MG: 10 INJECTION INTRAMUSCULAR; INTRAVENOUS at 12:00

## 2021-02-24 RX ADMIN — ENOXAPARIN SODIUM 30 MG: 100 INJECTION SUBCUTANEOUS at 11:44

## 2021-02-24 RX ADMIN — LORAZEPAM 1 MG: 1 TABLET ORAL at 21:23

## 2021-02-24 RX ADMIN — POTASSIUM CHLORIDE 40 MEQ: 1500 TABLET, EXTENDED RELEASE ORAL at 08:00

## 2021-02-24 RX ADMIN — ALBUTEROL SULFATE 2 PUFF: 108 INHALANT RESPIRATORY (INHALATION) at 14:10

## 2021-02-24 RX ADMIN — LORAZEPAM 1 MG: 2 INJECTION INTRAMUSCULAR; INTRAVENOUS at 16:37

## 2021-02-24 RX ADMIN — FAMOTIDINE 20 MG: 20 TABLET ORAL at 08:00

## 2021-02-24 RX ADMIN — VITAMIN D, TAB 1000IU (100/BT) 2000 UNITS: 25 TAB at 08:00

## 2021-02-24 RX ADMIN — TIZANIDINE 4 MG: 2 TABLET ORAL at 08:00

## 2021-02-24 RX ADMIN — MONTELUKAST 10 MG: 10 TABLET, FILM COATED ORAL at 08:00

## 2021-02-24 RX ADMIN — FAMOTIDINE 20 MG: 20 TABLET ORAL at 07:59

## 2021-02-24 RX ADMIN — ZINC SULFATE 220 MG (50 MG) CAPSULE 1 CAPSULE: CAPSULE at 08:01

## 2021-02-24 RX ADMIN — ALBUTEROL SULFATE 2 PUFF: 108 INHALANT RESPIRATORY (INHALATION) at 19:20

## 2021-02-24 RX ADMIN — OXYCODONE HYDROCHLORIDE AND ACETAMINOPHEN 1000 MG: 500 TABLET ORAL at 16:37

## 2021-02-24 NOTE — PROGRESS NOTES
[facility]  History and Physical    Patient ID:  Celio Irizarry  male  1960  [id number]      Admission Date: 2/21/2021  Chief Complaint: Worsening shortness of breath   Reason for Admission: Acute hypoxic respiratory failure secondary to COVID-19    ASSESSMENT & PLAN:    Acute hypoxic respiratory failure- the patient initially presented with sats of 88 to 84% on room air per ER staff. He was placed on 4 L of oxygen and satting 95%. But decompensated overnight on February 21, 2021 and had to be placed on Airvo. We will continue [place] and wean as tolerated. He has been using incentive spirometry but has to be reminded to do so today. Very anxious-we will increase his Ativan that he takes at home from every 8 hours to every 6 hours with a one-time dose of 1 mg IV    COVID-19 pneumonia-    -Continue steroids and remdesivir. He is status post convalescent plasma x1  -Continue to encourage proning    Concern for possible superimposed bacterial pneumonia-  - Continue ceftriaxone and doxycycline      Recurrent episodes of hypoglycemia at baseline for years per the patient -  -continue to monitor blood sugars and cover with insulin if needed    Renal mass noted on ct chest- Renal ultrasound ordered. The patient states that the mass is not new and he is known about it for about 15 years. Renal ultrasound results are pending. Continue other current medical management   further work-up and management based on his clinical course this    Disposition: Continue on the COVID-19 unit for now. likely to home on discharge. Hopefully will be improved in the next 3 to 4 days. Diet: Regular  VTE ppx: Lovenox  GI ppx: Pepcid  CODE STATUS: Full  Surrogate decision-maker: Yancy Dominguez [phone]    HISTORY OF PRESENT ILLNESS:  Patient is a 64 y.o. male with medical h/o anxiety and chronic gastric who was seen in the ER on February 18, 2021 and diagnosed with COVID-19 pneumonia.   He was discharged to home but per him he continued to get progressively worse and short of breath. Per the patient he denies entire family are sick with the COVID-19. They did have a gathering for the Super Bowl and this is where he thinks he may have gotten infected. In the ER the patient was found to have O2 sats of 80 to 84% on room air. He was placed on 4 L of oxygen as above. And the hospitalist were asked to admit him for further management and treatment. The patient is very anxious. He is short of breath and nausea. But he denies any fevers, chills, diarrhea or burning or pain on micturition. He has had worsening of his baseline nausea and vomiting over the last several days with the COVID-19. [facility] and has had extensive work-up in the past for the nausea and vomiting as well as general dyspeptic symptoms with no etiology found. He states that he does run in his family and they called it the PLUQ curse. 2021- he had to be placed on Airvo overnight. He still has not proned. Denies any chest pain fevers chills nausea \"of breath. Feels like the dyspnea comes and goes. Afebrile overnight. Allergies   Allergen Reactions    Iohexol Diarrhea     Other reaction(s): Abdominal pain-I    Penicillin G Swelling       Prior to Admission Medications   Prescriptions Last Dose Informant Patient Reported? Taking? LORazepam (ATIVAN) 1 mg tablet   No No   Sig: Take 1 Tab by mouth every eight (8) hours as needed for Anxiety. Max Daily Amount: 3 mg. Lactobacillus acidophilus 10 billion cell cap   Yes No   Si Billion One capsule daily   amitriptyline (ELAVIL) 10 mg tablet   Yes No   Sig: TAKE 1 TABLET BY MOUTH EVERYDAY AT BEDTIME   benzonatate (Tessalon Perles) 100 mg capsule   No No   Sig: Take 1 Cap by mouth three (3) times daily as needed for Cough for up to 7 days.    famotidine (PEPCID) 20 mg tablet   Yes No   Sig: Take 20 mg by mouth two (2) times a day. guaiFENesin-codeine (GUAIFENESIN AC) 100-10 mg/5 mL solution   No No   Sig: Take 5 mL by mouth three (3) times daily as needed for Cough. Max Daily Amount: 15 mL.   hyoscyamine SL (LEVSIN/SL) 0.125 mg SL tablet   Yes No   Sig: TAKE 1 TABLET BY SUBLINGUAL ROUTE 4 TIMES EVERY DAY AS NEEDED FOR PAIN/CRAMPING   montelukast (SINGULAIR) 10 mg tablet   No No   Sig: Take 1 Tab by mouth daily. naproxen (NAPROSYN) 500 mg tablet   No No   Sig: Take 1 Tab by mouth two (2) times daily (with meals). omeprazole (PRILOSEC) 40 mg capsule   Yes No   Sig: Take 40 mg by mouth daily. ondansetron (ZOFRAN ODT) 8 mg disintegrating tablet   No No   Sig: Take 1 Tab by mouth every eight (8) hours as needed for Nausea. tiZANidine (ZANAFLEX) 4 mg tablet   No No   Sig: Take 1 Tab by mouth three (3) times daily as needed. zolpidem (AMBIEN) 5 mg tablet   No No   Sig: Take 1 Tab by mouth nightly as needed for Sleep. Max Daily Amount: 5 mg.       Facility-Administered Medications: None       Diagnosis Date    Chest discomfort 11/18/2015    Tightness, pressure     Chronic left maxillary sinusitis     Depression     Dyspnea 11/18/2015    Shortness of breath      GERD (gastroesophageal reflux disease)     controlled with meds     Hypoglycemia     Mixed hyperlipidemia 11/18/2015   Past Medical History:   Diagnosis Date    Chest discomfort 11/18/2015    Tightness, pressure     Chronic left maxillary sinusitis     Depression     Dyspnea 11/18/2015    Shortness of breath      GERD (gastroesophageal reflux disease)     controlled with meds     Hypoglycemia     Mixed hyperlipidemia 11/18/2015      Procedure Laterality Date    COLONOSCOPY      HX HEENT Left 12/10/2020    FESS w/ L max antrostomy/ant ethmoidectomy- Yash Horton         Social History     Tobacco Use    Smoking status: Never Smoker    Smokeless tobacco: Never Used   Substance Use Topics    Alcohol use: No       FAMILY HISTORY: Mother suffers from diabetes hypertension. Family History   Problem Relation Age of Onset    Heart Attack Mother 66    High Cholesterol Father        REVIEW OF SYSTEMS:  A 14 point review of systems was taken and pertinent positive as per HPI. PHYSICAL EXAM:    Visit Vitals  /73 (BP 1 Location: Right upper arm, BP Patient Position: At rest;Supine)   Pulse 86   Temp 98.8 °F (37.1 °C)   Resp 18   Ht 5' 8\" (1.727 m)   Wt 83.9 kg (185 lb)   SpO2 90%   BMI 28.13 kg/m²       General: Middle-aged gentleman in obvious distress secondary to shortness of breath and anxiety. He is speaking in full sentences. no use of accessory muscles of respiration today. Appears less ill. Looks anxious. Tachypneic      HEENT: Pupils equal and reactive to light and accommodation, oropharynx is clear   Neck: Supple, no lymphadenopathy, no JVD     Lungs: Equal to auscultation bilaterally.   Air entry equal bilaterally and diminished in the bases   Cardiovascular: Regular rate and rhythm with normal S1 and S2     Abdomen: Soft, nontender, nondistended, normoactive bowel sounds   Extremities: No cyanosis clubbing or edema     Neuro: Nonfocal, A&O x3   Psych: Normal mood and affect     Intake/Output last 3 shifts:    Date 02/20/21 1900 - 02/21/21 0659(Not Admitted) 02/21/21 0700 - 02/22/21 0659   Shift 8250-0093 24 Hour Total 7672-6668 1491-9396 24 Hour Total   INTAKE   P.O.   114  114     P.O.   114  114   I.V.   1050(1)  1050     Volume (sodium chloride 0.9 % bolus infusion 1,000 mL)   1000  1000     Volume (cefTRIAXone (ROCEPHIN) 2 g in 0.9% sodium chloride (MBP/ADV) 50 mL MBP)   50  50   Shift Total(mL/kg)   1663(45.7)  7416(62.0)   OUTPUT   Urine   250(0.2)  250     Urine Voided   250  250   Shift Total(mL/kg)   250(3)  250(3)   NET   914  914   Weight (kg)   83.9 83.9 83.9         Labs:  CMP:   Lab Results   Component Value Date/Time     02/21/2021 09:21 AM    K 3.4 (L) 02/21/2021 09:21 AM     02/21/2021 09:21 AM    CO2 27 02/21/2021 09:21 AM    AGAP 10 02/21/2021 09:21 AM     (H) 02/21/2021 09:21 AM    BUN 12 02/21/2021 09:21 AM    CREA 1.16 02/21/2021 09:21 AM    GFRAA >60 02/21/2021 09:21 AM    GFRNA >60 02/21/2021 09:21 AM    CA 9.0 02/21/2021 09:21 AM    ALB 3.2 (L) 02/21/2021 09:21 AM    TBILI 0.4 02/21/2021 09:21 AM    TP 7.5 02/21/2021 09:21 AM    GLOB 4.3 (H) 02/21/2021 09:21 AM    AGRAT 0.7 (L) 02/21/2021 09:21 AM    ALT 28 02/21/2021 09:21 AM         CBC:    Lab Results   Component Value Date/Time    WBC 6.3 02/21/2021 09:21 AM    HGB 15.8 02/21/2021 09:21 AM    HCT 46.9 02/21/2021 09:21 AM     02/21/2021 09:21 AM       No results found for: INR, PTMR, PTP, PT1, PT2, INREXT, INREXT    ABG:  No results found for: PH, PHI, PCO2, PCO2I, PO2, PO2I, HCO3, HCO3I, FIO2, FIO2I        Lab Results   Component Value Date/Time    Troponin-I, Qt. <0.02 (L) 10/05/2019 01:48 PM         Imaging & Other Studies:    XR Results (maximum last 3): Results from Hospital Encounter encounter on 02/21/21   XR CHEST PORT    Narrative Portable chest xray      Comparison: 2/18/2021 and prior    Indication: Shortness of breath    Findings: Mild cardiac enlargement. Increased mild perihilar pulmonary  infiltrates without consolidation, effusion, pneumothorax. No discrete osseous  abnormality on the single view. Impression Increased pulmonary infiltrates without consolidation. FINDINGS can  be seen with volume overload and/or infection. Results from [facility] encounter on 02/18/21   XR CHEST PORT    Narrative PA CHEST X-RAY. Clinical Indication: Covid 19 positive , shortness of breath, cough, fever    Comparison: Chest x-ray dated 10/5/2019    Findings: Single view of the chest submitted demonstrate the cardiac silhouette  and mediastinum to be unremarkable. There is no pleural effusion or  pneumothorax. The lungs are underexpanded bilaterally. No definite airspace  consolidation or infiltrate.       Impression Underexpanded lungs without definite acute abnormality. Results from [facility] encounter on 10/05/19   XR CHEST PA LAT    Narrative Chest X-ray    INDICATION: Right chest pain    PA and lateral views of the chest were obtained. FINDINGS: The lungs are clear. There are no infiltrates or effusions. The heart  size is normal.  The bony thorax is intact. Impression IMPRESSION: No acute findings in the chest         CT Results (maximum last 3): Results from [facility] encounter on 04/26/18   CT CARDIAC OVER READ    Narrative CT CORONARY ANGIOGRAPHY WITH CONTRAST. HISTORY: Chest pain. TECHNIQUE: 2.5 mm noncontrast axial scans with small field-of-view centered  about the heart were followed by 119 cc contrast intravenously and 0.625 mm  axial scans through the heart. FINDINGS: Please see cardiologist's report regarding the coronary arteries. Within the included portion of the thorax and abdomen: No pulmonary nodules, air  space disease or pleural effusion. No evidence of aortic dissection. No gross  bony lesions. Pulmonary arteries are grossly unremarkable. Multiple  low-attenuation foci scattered throughout the liver consistent with simple  cysts. No gross bony lesions. Impression IMPRESSION: Simple liver cysts, otherwise unremarkable. CT CORONARY ART W CA+ W EF    Narrative CT Coronary Arteries with Calcium 4/26/2018    INDICATION: 61-year-old male referred for cardiac CTA with a history of chest  pain and negative stress test in the past.    PROCEDURE: A 64 slice CT coronary angiogram procedure was performed. 110 mL of  Optiray 350 were administered intravenously followed by axial imaging through  the chest with a standard cardiac reconstruction protocol. Patient was given  oral beta blockers prior to the procedure. The average heart rate was 62 beats  per minute. The calcium score was calculated and left ventricular function was  not assessed.     Technical Quality: Fair quality with some misregistration artifact noted to the  coronary arteries    INTERPRETATION:     Coronary Calcium Score: 0     Coronary Angiography:    1. The left main originates from the left cusp and divides into the left  anterior descending circumflex artery appears normal   2. The left anterior descending large vessel coursing the apex. It appears  normal throughout its very large caliber in the proximal segment. There is no  calcium identified. A some misregistration artifact in the mid vessel which cuts  off and I believe overall it is normal. The distal LAD at the apex is normal  large diagonal branches appear normal  3. The left circumflex large vessel which appears normal. Provides a fairly  large obtuse marginal branch which appears normal   4. The dominant right coronary artery dominant right coronary artery which  appears normal.    Left Ventricle: Normal size and thickness   Left Atrium: Normal size left atrium with the normal appendage and pulmonary  veins. Right Ventricle: Normal size right ventricle   Right Atrium: Normal size  Pericardium: No pericardial effusion  Aorta: Normal caliber aorta with no calcium identified. Pulmonary Artery: Normal   Valves: Grossly normal-appearing valves   Mediastinum / Lung Fields: Normal. Lung fields. There are some cysts in the  liver. This portion be over read by radiology. Impression IMPRESSION:  1. Coronary Calcium Score 0    2. Normal appearing coronary arteries. 3. Normal chamber dimensions. Results from [facility] encounter on 04/06/17   CT ABD PELV W CONT       MRI Results (maximum last 3):   Results from [facility] encounter on 10/14/08   MRI LUMBAR SPINE W/O CONT    Narrative [id number] [address]                    [address]                                            [address] [place] [address]                                                            [facility]          NAME: Connie Salas PT : 1960               SEX: M         MR#: [id number]     LOCATION/NS: MR-                 AGE: 47Y      ACCT: [de-identified]     ORDERING: Rashi Santizo                    PT TYPE: O                         RADIOLOGIST: Jos Hill ([id number])  Final Report       ICD Codes / Adm. Diagnosis:                  /     Examination:  MRI LUMBAR SPINE WO  - [id number] - Oct 14 2008  8:27AM       MRI LUMBAR SPINE WITHOUT CONTRAST 10/14/08     Reason:   41year-old with bilateral lower extremity pain, no known injury. No history of prior surgery. Patient has a known sebaceous cyst in the back. REPORT:      TECHNIQUE:  Sagittal T1, T2, STIR, axial T1 and T2-weighted sequences are   available for review. COMPARISON: None available. FINDINGS: There is degenerative disc signal present at L5-S1. There is   preservation of vertebral body height throughout. There is normal alignment   present. The remaining discs are normal in signal intensity. The conus is   normal in configuration and signal intensity throughout. Incompletely   evaluated, as seen on the  sequence, there are multiple bilateral renal   cysts. These measure up to 8 cm. There is a subcutaneous cystic structure   present which measures 12 x 26 x 25 mm. L2-3:  No significant disc herniation, neural foraminal narrowing or spinal   canal stenosis. L3-4:  No disc herniation, neural foraminal narrowing or spinal canal   stenosis. L4-5:  No significant disc herniation, neural foraminal narrowing or spinal   canal stenosis. L5-S1:  There is a circumferential disc bulge at this level with loss of   disc height and degenerative disc signal.  There is no significant mass   effect upon the descending or exiting nerve roots. IMPRESSION:      1. THERE IS DEGENERATIVE DISC DISEASE AT L5-S1 WITH A CIRCUMFERENTIAL DISC   BULGE, LOSS OF DISC HEIGHT AND LOSS OF DISC SIGNAL.   2. MULTIPLE BILATERAL RENAL CYSTS ARE PRESENT. 3. PROBABLE SEBACEOUS CYST. Interpreting/Reading Doctor: Dulce Beltran ([id number])  Transcribed: TLS on 10/14/2008  Approved: Dulce Beltran ([id number])  10/14/2008             Distribution:  Attending Doctor: Susy Appiah               Nuclear Medicine Results (maximum last 3): No results found for this or any previous visit. US Results (maximum last 3): Results from [facility] encounter on 12/17/10   [place] [id number]                    [address] [address]                                                            ULTRASOUND                          NAME: Juliana Echavarria                                                           PT : 1960               SEX: M         MR#: [id number]     LOCATION/NS: US-                 AGE: [address]      ACCT: [de-identified]     ORDERING: Susy Appiah                    PT TYPE: Isa BECKERPachecophilipp Teixeira ([id number])  Final Report       ICD Codes / Adm. Diagnosis:    /     Examination:  ABDOMINAL ULTRASOUND  - [id number] - Dec 17 2010  8:00AM    Reason:  ABD PAIN    REPORT:  Abdominal Ultrasound    INDICATION:  Abdominal pain    FINDINGS: There are multiple cysts in the liver measuring up to 2 cm in   size. No definite solid liver masses seen. There are no lesions visualized   portions of the spleen or pancreas. . There is no bile duct dilatation. The   common bile duct measures 5 mm. There several fixed echogenic polyps in the   gallbladder. The major 6 mm in size. There is no wall thickening of   gallbladder distention. .    The right kidney measures 10.9 cm in length. The left kidney measures 11.7   cm. There is no hydronephrosis. There are complex cyst in both kidneys. The   cyst in the left kidney measures 7.6 cm in size. The cyst in the right   kidney measures 5.4 cm.  The cysts were noted on prior ultrasound 2008   and are relatively stable in size. . There is no ascites. The aorta and   inferior vena cava are normal in caliber. IMPRESSION:   1. Stable small gallbladder polyps. 2. Stable cysts in the liver and kidneys        Interpreting/Reading Doctor: Carole Aviles ([id number])  Transcribed:  on 2010  Approved: Carole Aviles ([id number])  2010             Distribution:  Attending Doctor: Christi Gill           Results from Hospital Encounter encounter on 08   [address]                                                            ULTRASOUND                          NAME: Nithin Zavala                                                           PT : 1960               SEX: M         MR#: [id number]     LOCATION/NS: CT--                AGE: 47Y      ACCT: [de-identified]     ORDERING: Lyric KRISHNA              PT TYPE: Myrna Chuck                         RADIOLOGISTSmonica Jose Maria ([id number])               ICD Codes / Adm. Diagnosis:                  /     Examination:  ABDOMINAL ULTRASOUND  - [id number] - 2008 12:46PM       ABDOMINAL ULTRASOUND, 08:    Reason:   Renal and hepatic cysts demonstrated on the CT. TECHNIQUE:  Sonographic images of the abdomen were obtained. REPORT:       Multiple simple cysts are present throughout the liver,   measuring up to 1.8 cm in diameter in the left hepatic lobe. No solid   hepatic masses or biliary ductal dilatation is present. The pancreas is   normal in appearance. A few small polyps are present in the gallbladder. The common bile duct is 4.1 mm in diameter. The right kidney is 12.0 cm in   length. The right kidney contains a 4.7 cm simple cyst.    The left kidney contains a multicystic mass measuring 7.5 x 6.8 cm in   diameter.   The septations are thin and linear and no solid nodular areas are   present. This may be a complex cyst.  The spleen is 10.0 cm in length. IMPRESSION:    1. MULTIPLE HEPATIC CYSTS. 2.     MULTI-SEPTATED CYST IN THE LEFT KIDNEY MEASURING UP TO 6.9 CM IN   DIAMETER. THIS MAY BE A CLASS IIF CYST. NO DEFINITE SOURCE FOR THE   PATIENT'S HEMATURIA IS IDENTIFIED. AS A NEXT STEP, THIS COULD BE FURTHER   CHARACTERIZED WITH A RENAL PROTOCOL CT WITHOUT AND WITH CONTRAST TO ASSESS   FOR ANY SOLID ENHANCEMENT. Interpreting/Reading Doctor: Zachary Bamberger ([id number])  Transcribed: TLS on 02/15/2008  Approved: Zachary Bamberger ([id number])  02/15/2008             Distribution:  Attending Doctor: Michelle Holcomb Results (maximum last 3): No results found for this or any previous visit. LEXX Results (maximum last 3): No results found for this or any previous visit. IR Results (maximum last 3): No results found for this or any previous visit. VAS/US Results (maximum last 3): No results found for this or any previous visit. PET Results (maximum last 3): No results found for this or any previous visit. EKG Results     Procedure 720 Value Units Date/Time    EKG 12 LEAD INITIAL [[id number]] Collected: 02/21/21 0915    Order Status: Completed Updated: 02/21/21 1649     Ventricular Rate 94 BPM      Atrial Rate 94 BPM      P-R Interval 146 ms      QRS Duration 74 ms      Q-T Interval 322 ms      QTC Calculation (Bezet) 402 ms      Calculated P Axis 50 degrees      Calculated R Axis 27 degrees      Calculated T Axis 25 degrees      Diagnosis --     !! AGE AND GENDER SPECIFIC ECG ANALYSIS !!   Normal sinus rhythm  Normal ECG  When compared with ECG of 18-FEB-2021 19:10,  No significant change was found  Confirmed by ST KACY RIZZO MD (), EDWARD SYLVESTER ([id number]) on 2/21/2021 4:49:02 PM            Active Problems:    Patient Active Problem List    Diagnosis Date Noted    Respiratory failure with hypoxia ([address] 02/21/2021    COVID-19 02/21/2021  Fall 12/11/2018    Acute left-sided low back pain without sciatica 12/11/2018    Contusion of left knee 12/11/2018    Abnormal ultrasound of liver 07/18/2018    Acute gastritis 07/18/2018    Gastroesophageal reflux disease without esophagitis 07/18/2018    Internal hemorrhoids 07/18/2018    IBS (irritable bowel syndrome) 07/18/2018    Elevated alkaline phosphatase level 07/18/2018    Testosterone deficiency 03/02/2018    Kidney stones 11/17/2017    Anxiety disorder 04/21/2017    Mixed hyperlipidemia 11/18/2015    Chest discomfort 11/18/2015    Dyspnea 11/18/2015    Dyslipidemia 09/02/2015         Namita Peter MD  [facility] Hospitalist Service  2/21/2021 11:12 AM

## 2021-02-24 NOTE — PROGRESS NOTES
Shift Assessment. Pt is A&O X 4. Pt cardiac rhythm is regular. Pt lungs are diminished. Pt is on airvo 50L at 70%. Bowel sounds present. Pt denies pain. Pt reports nausea. Pt is resting in bed with the bed in the lowest position and the call light within reach.

## 2021-02-24 NOTE — PROGRESS NOTES
Chart screened by CM for d/c planning. Pt continues to have high O2 needs. He is on 50L O2 heated HFNC Airvo 70%. Receiving Remdesivir (day 3/5). Pt has a Dx of Anxiety D/O and has been having frequent episodes of anxiety. PT/OT evals remain on hold until pt's respiratory status will allow him to participate. D/C plan is undetermined at this time. No immediate needs identified. CM will continue to follow and remain available if any needs arise.

## 2021-02-25 LAB
ALBUMIN SERPL-MCNC: 2.5 G/DL (ref 3.5–5)
ALBUMIN/GLOB SERPL: 0.7 {RATIO} (ref 1.2–3.5)
ALP SERPL-CCNC: 93 U/L (ref 50–136)
ALT SERPL-CCNC: 26 U/L (ref 12–65)
ANION GAP SERPL CALC-SCNC: 5 MMOL/L (ref 7–16)
AST SERPL-CCNC: 14 U/L (ref 15–37)
BILIRUB SERPL-MCNC: 0.6 MG/DL (ref 0.2–1.1)
BUN SERPL-MCNC: 30 MG/DL (ref 6–23)
CALCIUM SERPL-MCNC: 8.6 MG/DL (ref 8.3–10.4)
CHLORIDE SERPL-SCNC: 112 MMOL/L (ref 98–107)
CO2 SERPL-SCNC: 27 MMOL/L (ref 21–32)
CREAT SERPL-MCNC: 0.85 MG/DL (ref 0.8–1.5)
GLOBULIN SER CALC-MCNC: 3.7 G/DL (ref 2.3–3.5)
GLUCOSE SERPL-MCNC: 121 MG/DL (ref 65–100)
POTASSIUM SERPL-SCNC: 4 MMOL/L (ref 3.5–5.1)
PROT SERPL-MCNC: 6.2 G/DL (ref 6.3–8.2)
SODIUM SERPL-SCNC: 144 MMOL/L (ref 136–145)

## 2021-02-25 PROCEDURE — 80053 COMPREHEN METABOLIC PANEL: CPT

## 2021-02-25 PROCEDURE — 74011000250 HC RX REV CODE- 250: Performed by: INTERNAL MEDICINE

## 2021-02-25 PROCEDURE — 65270000029 HC RM PRIVATE

## 2021-02-25 PROCEDURE — 74011250636 HC RX REV CODE- 250/636: Performed by: FAMILY MEDICINE

## 2021-02-25 PROCEDURE — 94640 AIRWAY INHALATION TREATMENT: CPT

## 2021-02-25 PROCEDURE — 94762 N-INVAS EAR/PLS OXIMTRY CONT: CPT

## 2021-02-25 PROCEDURE — 74011000258 HC RX REV CODE- 258: Performed by: INTERNAL MEDICINE

## 2021-02-25 PROCEDURE — 77010033711 HC HIGH FLOW OXYGEN

## 2021-02-25 PROCEDURE — 36415 COLL VENOUS BLD VENIPUNCTURE: CPT

## 2021-02-25 PROCEDURE — 74011250636 HC RX REV CODE- 250/636: Performed by: INTERNAL MEDICINE

## 2021-02-25 PROCEDURE — 74011250637 HC RX REV CODE- 250/637: Performed by: INTERNAL MEDICINE

## 2021-02-25 RX ORDER — LOPERAMIDE HYDROCHLORIDE 2 MG/1
2 CAPSULE ORAL
Status: DISCONTINUED | OUTPATIENT
Start: 2021-02-25 | End: 2021-03-12 | Stop reason: HOSPADM

## 2021-02-25 RX ORDER — DEXAMETHASONE SODIUM PHOSPHATE 100 MG/10ML
6 INJECTION INTRAMUSCULAR; INTRAVENOUS EVERY 12 HOURS
Status: DISCONTINUED | OUTPATIENT
Start: 2021-02-25 | End: 2021-03-02

## 2021-02-25 RX ADMIN — ALBUTEROL SULFATE 2 PUFF: 108 INHALANT RESPIRATORY (INHALATION) at 11:08

## 2021-02-25 RX ADMIN — ONDANSETRON 4 MG: 2 INJECTION INTRAMUSCULAR; INTRAVENOUS at 16:55

## 2021-02-25 RX ADMIN — AMITRIPTYLINE HYDROCHLORIDE 10 MG: 10 TABLET, FILM COATED ORAL at 22:01

## 2021-02-25 RX ADMIN — ALBUTEROL SULFATE 2 PUFF: 108 INHALANT RESPIRATORY (INHALATION) at 13:29

## 2021-02-25 RX ADMIN — ONDANSETRON 4 MG: 2 INJECTION INTRAMUSCULAR; INTRAVENOUS at 08:06

## 2021-02-25 RX ADMIN — MONTELUKAST 10 MG: 10 TABLET, FILM COATED ORAL at 08:06

## 2021-02-25 RX ADMIN — LORAZEPAM 1 MG: 1 TABLET ORAL at 08:06

## 2021-02-25 RX ADMIN — TIZANIDINE 4 MG: 2 TABLET ORAL at 08:06

## 2021-02-25 RX ADMIN — ENOXAPARIN SODIUM 30 MG: 100 INJECTION SUBCUTANEOUS at 12:27

## 2021-02-25 RX ADMIN — DEXAMETHASONE SODIUM PHOSPHATE 6 MG: 10 INJECTION INTRAMUSCULAR; INTRAVENOUS at 22:01

## 2021-02-25 RX ADMIN — REMDESIVIR 100 MG: 100 INJECTION, POWDER, LYOPHILIZED, FOR SOLUTION INTRAVENOUS at 15:19

## 2021-02-25 RX ADMIN — ZINC SULFATE 220 MG (50 MG) CAPSULE 1 CAPSULE: CAPSULE at 08:06

## 2021-02-25 RX ADMIN — FAMOTIDINE 20 MG: 20 TABLET ORAL at 16:55

## 2021-02-25 RX ADMIN — VITAMIN D, TAB 1000IU (100/BT) 2000 UNITS: 25 TAB at 08:06

## 2021-02-25 RX ADMIN — OXYCODONE HYDROCHLORIDE AND ACETAMINOPHEN 1000 MG: 500 TABLET ORAL at 08:07

## 2021-02-25 RX ADMIN — ALBUTEROL SULFATE 2 PUFF: 108 INHALANT RESPIRATORY (INHALATION) at 21:10

## 2021-02-25 RX ADMIN — ENOXAPARIN SODIUM 30 MG: 100 INJECTION SUBCUTANEOUS at 00:36

## 2021-02-25 RX ADMIN — FAMOTIDINE 20 MG: 20 TABLET ORAL at 08:06

## 2021-02-25 RX ADMIN — OXYCODONE HYDROCHLORIDE AND ACETAMINOPHEN 1000 MG: 500 TABLET ORAL at 16:55

## 2021-02-25 RX ADMIN — ENOXAPARIN SODIUM 30 MG: 100 INJECTION SUBCUTANEOUS at 23:56

## 2021-02-25 RX ADMIN — TIZANIDINE 4 MG: 2 TABLET ORAL at 16:55

## 2021-02-25 RX ADMIN — LORAZEPAM 1 MG: 1 TABLET ORAL at 16:56

## 2021-02-25 NOTE — PROGRESS NOTES
Progress Note    Patient: India Escobar MRN: [id number]  SSN: [id number]    YOB: 1960  Age: 64 y.o. Sex: male      Admit Date: 2/21/2021    LOS: 4 days     Subjective: Follow-up COVID    \"58 y.o. male with medical h/o anxiety and GERD who was seen in the ER on February 18, 2021 and diagnosed with COVID-19 pneumonia. He was discharged to home but per him he continued to get progressively worse and short of breath. Per the patient he denies entire family are sick with the COVID-19. They did have a gathering for the Super Bowl and this is where he thinks he may have gotten infected. \" S/p convalescent plasma on 2/22. Last dose of remdesivir 2/25. Maxed on airvo. S/p Ceftriaxone and doxycycline one dose.       Currently on 60L 100%. Less SOB, weak, nauseated and not eating well. Having diarrhea. Not proning or using his IS.    Current Facility-Administered Medications   Medication Dose Route Frequency    LORazepam (ATIVAN) tablet 1 mg  1 mg Oral Q6H PRN    0.9% sodium chloride infusion 250 mL  250 mL IntraVENous PRN    amitriptyline (ELAVIL) tablet 10 mg  10 mg Oral QHS    benzonatate (TESSALON) capsule 100 mg  100 mg Oral TID PRN    famotidine (PEPCID) tablet 20 mg  20 mg Oral BID    guaiFENesin-codeine (ROBITUSSIN AC) 100-10 mg/5 mL solution 5 mL  5 mL Oral TID PRN    montelukast (SINGULAIR) tablet 10 mg  10 mg Oral DAILY    tiZANidine (ZANAFLEX) tablet 4 mg  4 mg Oral TID PRN    zolpidem (AMBIEN) tablet 5 mg  5 mg Oral QHS PRN    hyoscyamine SL (LEVSIN/SL) tablet 0.25 mg  0.25 mg SubLINGual Q4H PRN    enoxaparin (LOVENOX) injection 30 mg  30 mg SubCUTAneous Q12H    acetaminophen (TYLENOL) tablet 650 mg  650 mg Oral Q6H PRN    Or    acetaminophen (TYLENOL) suppository 650 mg  650 mg Rectal Q6H PRN    cholecalciferol (VITAMIN D3) (1000 Units /25 mcg) tablet 2,000 Units  2,000 Units Oral DAILY    dexamethasone (DECADRON) 10 mg/mL injection 6 mg  6 mg IntraVENous Q24H    albuterol (PROVENTIL HFA, VENTOLIN HFA, PROAIR HFA) inhaler 2 Puff  2 Puff Inhalation Q6H RT    ascorbic acid (vitamin C) (VITAMIN C) tablet 1,000 mg  1,000 mg Oral BID    remdesivir 100 mg in 0.9% sodium chloride 250 mL IVPB  100 mg IntraVENous Q24H    guaiFENesin (ROBITUSSIN) 100 mg/5 mL oral liquid 200 mg  200 mg Oral Q4H PRN    ondansetron (ZOFRAN) injection 4 mg  4 mg IntraVENous Q6H PRN    zinc sulfate (ZINCATE) 220 (50) mg capsule 1 Cap  1 Cap Oral DAILY       Objective:     Vitals:    02/24/21 2211 02/24/21 2346 02/25/21 0439 02/25/21 0631   BP:  107/79 110/80    Pulse:  83 84    Resp:  18 18    Temp:  98.6 °F (37 °C) 98.2 °F (36.8 °C)    SpO2: 92% 91% 92% 92%   Weight:       Height:             Intake and Output:  Current Shift: No intake/output data recorded. Last three shifts: 02/23 1901 - 02/25 0700  In: 240 [P.O.:240]  Out: 1320 [Urine:1320]    Physical Exam:   General:  Alert, cooperative, no distress, weak appearing. Eyes:  Conjunctivae/corneas clear. Ears:  Normal TMs and external ear canals both ears. Nose: Nares normal. Septum midline. Mouth/Throat: Lips, mucosa, and tongue normal.    Neck:  no JVD. Back:   deferred   Lungs:   Clear to auscultation bilaterally but limited breathe sounds. Shallow breathing. No respiratory distress. Heart:  Regular rate and rhythm, S1, S2 normal   Abdomen:   Soft, non-tender. Bowel sounds normal.    Extremities: Extremities normal, atraumatic, no cyanosis or edema. Pulses: 2+ and symmetric all extremities. Skin: Skin color, texture, turgor normal. No rashes or lesions   Lymph nodes: Cervical, supraclavicular, and axillary nodes normal.   Neurologic: CNII-XII intact. Not moving well in bed. Lab/Data Review:    No results found for this or any previous visit (from the past 24 hour(s)).     Assessment/ Plan:     Principal Problem:    Respiratory failure with hypoxia ([address] (2/21/2021)    Active Problems:    COVID-19 (2/21/2021)    COVID - s/p CP. Last dose remdesivir today. Albuterol q6hr. Vitamin C, Vitamin D, Zinc. Decadron that I will increase to BID for steroid burst. PPI. Stated to prone and use IS. Nausea - Try one time dose 8mg Zofran today if still with poor PO intake. Diarrhea - Add PRN immodium    Consult PT/OT.      DVT prophylaxis - Lovenox   Signed By: Shaheen Chin DO     February 25, 2021

## 2021-02-25 NOTE — PROGRESS NOTES
Occupational Therapy Note: orders received, chart reviewed and noted pt is on 60L 100% Airvo, will HOLD OT evaluation until pt's respiratory status improves. Thank you.  Sameer Slade MS, OTR/L

## 2021-02-26 LAB
BACTERIA SPEC CULT: NORMAL
BACTERIA SPEC CULT: NORMAL
SERVICE CMNT-IMP: NORMAL
SERVICE CMNT-IMP: NORMAL

## 2021-02-26 PROCEDURE — 94762 N-INVAS EAR/PLS OXIMTRY CONT: CPT

## 2021-02-26 PROCEDURE — 74011250636 HC RX REV CODE- 250/636: Performed by: INTERNAL MEDICINE

## 2021-02-26 PROCEDURE — 65270000029 HC RM PRIVATE

## 2021-02-26 PROCEDURE — 74011250636 HC RX REV CODE- 250/636: Performed by: FAMILY MEDICINE

## 2021-02-26 PROCEDURE — 74011250637 HC RX REV CODE- 250/637: Performed by: INTERNAL MEDICINE

## 2021-02-26 PROCEDURE — 97535 SELF CARE MNGMENT TRAINING: CPT

## 2021-02-26 PROCEDURE — 97530 THERAPEUTIC ACTIVITIES: CPT

## 2021-02-26 PROCEDURE — 97162 PT EVAL MOD COMPLEX 30 MIN: CPT

## 2021-02-26 PROCEDURE — 94640 AIRWAY INHALATION TREATMENT: CPT

## 2021-02-26 PROCEDURE — 97165 OT EVAL LOW COMPLEX 30 MIN: CPT

## 2021-02-26 RX ADMIN — VITAMIN D, TAB 1000IU (100/BT) 2000 UNITS: 25 TAB at 08:09

## 2021-02-26 RX ADMIN — ONDANSETRON 4 MG: 2 INJECTION INTRAMUSCULAR; INTRAVENOUS at 17:36

## 2021-02-26 RX ADMIN — ALBUTEROL SULFATE 2 PUFF: 108 INHALANT RESPIRATORY (INHALATION) at 19:40

## 2021-02-26 RX ADMIN — OXYCODONE HYDROCHLORIDE AND ACETAMINOPHEN 1000 MG: 500 TABLET ORAL at 17:06

## 2021-02-26 RX ADMIN — ONDANSETRON 4 MG: 2 INJECTION INTRAMUSCULAR; INTRAVENOUS at 08:12

## 2021-02-26 RX ADMIN — FAMOTIDINE 20 MG: 20 TABLET ORAL at 17:06

## 2021-02-26 RX ADMIN — ALBUTEROL SULFATE 2 PUFF: 108 INHALANT RESPIRATORY (INHALATION) at 03:06

## 2021-02-26 RX ADMIN — ENOXAPARIN SODIUM 30 MG: 100 INJECTION SUBCUTANEOUS at 12:01

## 2021-02-26 RX ADMIN — DEXAMETHASONE SODIUM PHOSPHATE 6 MG: 10 INJECTION INTRAMUSCULAR; INTRAVENOUS at 21:40

## 2021-02-26 RX ADMIN — DEXAMETHASONE SODIUM PHOSPHATE 6 MG: 10 INJECTION INTRAMUSCULAR; INTRAVENOUS at 08:06

## 2021-02-26 RX ADMIN — OXYCODONE HYDROCHLORIDE AND ACETAMINOPHEN 1000 MG: 500 TABLET ORAL at 08:09

## 2021-02-26 RX ADMIN — AMITRIPTYLINE HYDROCHLORIDE 10 MG: 10 TABLET, FILM COATED ORAL at 21:40

## 2021-02-26 RX ADMIN — FAMOTIDINE 20 MG: 20 TABLET ORAL at 08:09

## 2021-02-26 RX ADMIN — MONTELUKAST 10 MG: 10 TABLET, FILM COATED ORAL at 08:09

## 2021-02-26 RX ADMIN — ALBUTEROL SULFATE 2 PUFF: 108 INHALANT RESPIRATORY (INHALATION) at 08:24

## 2021-02-26 RX ADMIN — ALBUTEROL SULFATE 2 PUFF: 108 INHALANT RESPIRATORY (INHALATION) at 13:26

## 2021-02-26 RX ADMIN — ZINC SULFATE 220 MG (50 MG) CAPSULE 1 CAPSULE: CAPSULE at 08:09

## 2021-02-26 NOTE — PROGRESS NOTES
ACUTE OT GOALS:  (Developed with and agreed upon by patient and/or caregiver.)  1) Patient will complete lower body bathing and dressing with stand by assistance and adaptive equipment as needed. 2) Patient will complete toileting with stand by assistance. 3) Patient will complete functional transfers with stand by assistance and adaptive equipment as needed. 4) Patient will tolerate at least 25 minutes of OT activity with 1-2 rest breaks while maintaining O2 sats >90%. 5) Patient will verbalize at least 3 energy conservation technique to utilize during ADL/IADL. Timeframe: 7 visits     OCCUPATIONAL THERAPY ASSESSMENT: Initial Assessment OT Treatment Day Nabil Madden is a 64 y.o. male   PRIMARY DIAGNOSIS: Respiratory failure with hypoxia ([facility]  COVID-19 [U07.1]  Respiratory failure with hypoxia ([facility] [J96.91]       Reason for Referral:    ICD-10: Treatment Diagnosis: Generalized Muscle Weakness (M62.81)  Difficulty in walking, Not elsewhere classified (R26.2)  Other abnormalities of gait and mobility (R26.89)  INPATIENT: Payor: [employer] / Plan: [place] [employer] / Product Type: SERENA /   ASSESSMENT:     REHAB RECOMMENDATIONS:   Recommendation to date pending progress:  Setting:   No further skilled therapy   Equipment:    To Be Determined     PRIOR LEVEL OF FUNCTION:  (Prior to Hospitalization)  INITIAL/CURRENT LEVEL OF FUNCTION:  (Based on today's evaluation)   Bathing:   Independent  Dressing:   Independent  Feeding/Grooming:   Independent  Toileting:   Independent  Functional Mobility:   Independent Bathing:   Moderate Assistance  Dressing:   Moderate Assistance  Feeding/Grooming:   Minimal Assistance  Toileting:   Moderate Assistance  Functional Mobility:   Minimal Assistance - Moderate Assistance     ASSESSMENT:  Mr. Mary Alcala is a 65 y/o male presents with respiratory failure due to COVID-19.  RN stated appropriate for eval, but no movement due to fatigued from PT eval. Pt currently on 60L/100% on Airvo. Pt lives with family in a 2-story home but lives on 1st level. Pt has tub shower without shower chair and no AD at home. Pt works, drives and has family to assist if needed. Pt sats 89% at rest in supine after mobility/transfers with PT. Pt completed grooming ADL supine in bed with sats 87-90% and recovered to 93% after stopping movement and starting pursed lip breathing. Pt limited due to weakness right now. Pt with decreased functional ADLs, mobility and activity tolerance. Pt would benefit from skilled OT services during acute care stay to address deficits and goals. SUBJECTIVE:   Mr. Lee Herrera states, \"I was fine until this. \"    SOCIAL HISTORY/LIVING ENVIRONMENT: Pt lives with family in a 2-story home but lives on 1st level. Pt has tub shower without shower chair and no AD at home. Pt works, drives and has family to assist if needed. Home Environment: Private residence  One/Two Story Residence: Two story  Living Alone: No  Support Systems: Family member(s)    OBJECTIVE:     PAIN: VITAL SIGNS: LINES/DRAINS:   Pre Treatment: Pain Screen  Pain Scale 1: Numeric (0 - 10)  Pain Intensity 1: 0  Post Treatment: 0 Vital Signs  O2 Sat (%): (!) 89 %  O2 Device: Heated; Hi flow nasal cannula  O2 Flow Rate (L/min): 60 l/min  FIO2 (%): 100 % 60L 100%  O2 Device: Heated, Hi flow nasal cannula     GROSS EVALUATION:  BUE Within Functional Limits Abnormal/ Functional Abnormal/ Non-Functional (see comments) Not Tested Comments:   AROM [x] [] [] []    PROM [] [] [] []    Strength [] [x] [] []    Balance [] [] [] []    Posture [] [] [] []    Sensation [] [] [] []    Coordination [] [] [] []    Tone [] [] [] []    Edema [] [] [] []    Activity Tolerance [] [] [x] [] Sat to 87% grooming ADL supine in bed    [] [] [] []      COGNITION/  PERCEPTION: Intact Impaired   (see comments) Comments:   Orientation [x] []    Vision [] [x] Wears glasses   Hearing [x] []    Judgment/ Insight [x] []    Attention [x] []    Memory [x] []    Command Following [x] []    Emotional Regulation [x] []     [] []      ACTIVITIES OF DAILY LIVING: I Mod I S SBA CGA Min Mod Max Total NT Comments: unable to complete more than washing face, sats too low   BASIC ADLs:              Bathing/ Showering [] [] [] [] [] [] [] [] [] []    Toileting [] [] [] [] [] [] [] [] [] []    Dressing [] [] [] [] [] [] [] [] [] []    Feeding [] [] [] [] [] [] [] [] [] []    Grooming [] [] [] [] [] [x] [] [] [] []    Personal Device Care [] [] [] [] [] [] [] [] [] []    Functional Mobility [] [] [] [] [] [] [] [] [] []    I=Independent, Mod I=Modified Independent, S=Supervision, SBA=Standby Assistance, CGA=Contact Guard Assistance,   Min=Minimal Assistance, Mod=Moderate Assistance, Max=Maximal Assistance, Total=Total Assistance, NT=Not Tested    MOBILITY: I Mod I S SBA CGA Min Mod Max Total  NT x2 Comments: unable to test, sats too low   Supine to sit [] [] [] [] [] [] [] [] [] [] []    Sit to supine [] [] [] [] [] [] [] [] [] [] []    Sit to stand [] [] [] [] [] [] [] [] [] [] []    Bed to chair [] [] [] [] [] [] [] [] [] [] []    I=Independent, Mod I=Modified Independent, S=Supervision, SBA=Standby Assistance, CGA=Contact Guard Assistance,   Min=Minimal Assistance, Mod=Moderate Assistance, Max=Maximal Assistance, Total=Total Assistance, NT=Not Tested    [facility] [address] [address] AM-PAC 6 Clicks   Daily Activity Inpatient Short Form        How much help from another person does the patient currently need. .. Total A Lot A Little None   1. Putting on and taking off regular lower body clothing? [] 1   [x] 2   [] 3   [] 4   2. Bathing (including washing, rinsing, drying)? [] 1   [x] 2   [] 3   [] 4   3. Toileting, which includes using toilet, bedpan or urinal?   [] 1   [x] 2   [] 3   [] 4   4. Putting on and taking off regular upper body clothing? [] 1   [] 2   [x] 3   [] 4   5.   Taking care of personal grooming such as brushing teeth?   [] 1   [] 2   [x] 3   [] 4   6. Eating meals? [] 1   [] 2   [] 3   [x] 4   © 2007, Trustees of [address] [place] [address] [address] [address], under license to [employer]. All rights reserved     Score:  Initial: 16 Most Recent: X (Date: -- )   Interpretation of Tool:  Represents activities that are increasingly more difficult (i.e. Bed mobility, Transfers, Gait). PLAN:   FREQUENCY/DURATION:   for duration of hospital stay or until stated goals are met, whichever comes first.    PROBLEM LIST:   (Skilled intervention is medically necessary to address:)  1. Decreased ADL/Functional Activities  2. Decreased Activity Tolerance  3. Decreased Balance  4. Decreased Coordination  5. Decreased Gait Ability  6. Decreased Strength  7. Decreased Transfer Abilities   INTERVENTIONS PLANNED:   (Benefits and precautions of occupational therapy have been discussed with the patient.)  1. Self Care Training  2. Therapeutic Activity  3. Therapeutic Exercise/HEP  4. Neuromuscular Re-education  5. Gait Training  6. Education     TREATMENT:     EVALUATION: Low Complexity : (Untimed Charge)    TREATMENT:   (     )  Evaluation only this date, sats too low to progress with treatment. Pt washed face with min A supine in bed, sats 87% and recovered to 93% after stopping activity and starting pursed lip breathing.      AFTER TREATMENT POSITION/PRECAUTIONS:  Bed, Needs within reach and RN notified    INTERDISCIPLINARY COLLABORATION:  RN/PCT and OT/NIELSEN    TOTAL TREATMENT DURATION:  OT Patient Time In/Time Out  Time In: 1425  Time Out: One [facility] EDWARDO Rashid

## 2021-02-26 NOTE — PROGRESS NOTES
ACUTE PHYSICAL THERAPY GOALS:  (Developed with and agreed upon by patient and/or caregiver. )    LTG:  (1.)Mr. Lee Herrera will move from supine to sit and sit to supine , scoot up and down, and roll side to side in bed with INDEPENDENT within 7 treatment day(s). (2.)Mr. Lee Herrera will transfer from bed to chair and chair to bed with SUPERVISION using the least restrictive device within 7 treatment day(s). (3.)Mr. Lee Herrera will ambulate with SUPERVISION for 100+ feet with the least restrictive device within 7 treatment day(s). (4.)Mr. Lee Herrera will perform sit-stand x5  INDEPENDENT without using UEs under 18 seconds within 7 treatment day(s). ________________________________________________________________________________________________      ________________________________________________________________________________________________        PHYSICAL THERAPY ASSESSMENT: Initial Assessment and AM PT Treatment Day # 1      India Escobar is a 64 y.o. male   PRIMARY DIAGNOSIS: Respiratory failure with hypoxia ([facility]  COVID-19 [U07.1]  Respiratory failure with hypoxia ([facility] [J96.91]       Reason for Referral:    ICD-10: Treatment Diagnosis: Generalized Muscle Weakness (M62.81)  Difficulty in walking, Not elsewhere classified (R26.2)  INPATIENT: Payor: [employer] / Plan: [place] [employer] / Product Type: SERENA /     ASSESSMENT:     REHAB RECOMMENDATIONS:   Recommendation to date pending progress:  Setting:   No further skilled therapy   Equipment:    None     PRIOR LEVEL OF FUNCTION:  (Prior to Hospitalization) INITIAL/CURRENT LEVEL OF FUNCTION:  (Most Recently Demonstrated)   Bed Mobility:   Independent  Sit to Stand:   Independent  Transfers:   Independent  Gait/Mobility:   Independent Bed Mobility:   Moderate Assistance  Sit to Stand:   Minimal Assistance  Transfers:   Minimal Assistance  Gait/Mobility:   Minimal Assistance     ASSESSMENT:  Mr. Lee Herrera agreed to particiapte.  He needed periodic Dylon to help get to EOB. In sitting pt sat fully supported initially due to pt uncertainty and lightheadedness. after 10min he stood with Dylon and lasted 35sec limited by lightheadedness. 2nd attempt was for 40sec and limited by feeling winded. He requested getting OOB and performed trnf to chair, Dylon. Pt very comfortable OOB and in chair. O2 sat stayed above 96% entire session. Pt will benefit from skilled and sophisticated PT to help improve impairments. SUBJECTIVE:   Mr. Suhail Poole states, \"huh. \"    SOCIAL HISTORY/LIVING ENVIRONMENT: IND with all ADLs, no AD, drives, works for [employer] with lots of standing, moving around. Desires to return when cleared.    Home Environment: Private residence  One/Two Story Residence: Two story  Living Alone: No  Support Systems: Family member(s)  OBJECTIVE:     PAIN: VITAL SIGNS: LINES/DRAINS:   Pre Treatment: Pain Screen  Pain Scale 1: Numeric (0 - 10)  Pain Intensity 1: 8  Pain Location 1: Chest  Post Treatment: 0   50L, 100%  O2 Device: Heated, Hi flow nasal cannula     GROSS EVALUATION:   Within Functional Limits Abnormal/ Functional Abnormal/ Non-Functional (see comments) Not Tested Comments:   AROM [x] [] [] []    PROM [] [] [] []    Strength [] [x] [] []    Balance [] [x] [] []    Posture [x] [] [] []    Sensation [x] [] [] []    Coordination [x] [] [] []    Tone [] [] [] []    Edema [] [] [] []    Activity Tolerance [x] [] [] []     [] [] [] []      COGNITION/  PERCEPTION: Intact Impaired   (see comments) Comments:   Orientation [x] []    Vision [x] []    Hearing [x] []    Command Following [x] []    Safety Awareness [x] []     [] []      MOBILITY: I Mod I S SBA CGA Min Mod Max Total  NT x2 Comments:   Bed Mobility    Rolling [] [] [] [] [] [x] [] [] [] [] []    Supine to Sit [] [] [] [] [] [x] [] [] [] [] []    Scooting [] [] [] [] [] [] [] [] [] [] []    Sit to Supine [] [] [] [] [] [] [] [] [] [] []    Transfers    Sit to Stand [] [] [] [] [] [x] [] [] [] [] []    Bed to Chair [] [] [] [] [] [x] [] [] [] [] []    Stand to Sit [] [] [] [] [] [x] [] [] [] [] []    I=Independent, Mod I=Modified Independent, S=Supervision, SBA=Standby Assistance, CGA=Contact Guard Assistance,   Min=Minimal Assistance, Mod=Moderate Assistance, Max=Maximal Assistance, Total=Total Assistance, NT=Not Tested  GAIT: I Mod I S SBA CGA Min Mod Max Total  NT x2 Comments:   Level of Assistance [] [] [] [] [] [x] [] [] [] [] []    Distance 3    DME PT    Gait Quality unsteady    Weightbearing Status N/A     I=Independent, Mod I=Modified Independent, S=Supervision, SBA=Standby Assistance, CGA=Contact Guard Assistance,   Min=Minimal Assistance, Mod=Moderate Assistance, Max=Maximal Assistance, Total=Total Assistance, NT=Not Tested    [facility] Form       How much difficulty does the patient currently have. .. Unable A Lot A Little None   1. Turning over in bed (including adjusting bedclothes, sheets and blankets)? [] 1   [] 2   [x] 3   [] 4   2. Sitting down on and standing up from a chair with arms ( e.g., wheelchair, bedside commode, etc.)   [] 1   [] 2   [x] 3   [] 4   3. Moving from lying on back to sitting on the side of the bed? [] 1   [] 2   [x] 3   [] 4   How much help from another person does the patient currently need. .. Total A Lot A Little None   4. Moving to and from a bed to a chair (including a wheelchair)? [] 1   [] 2   [x] 3   [] 4   5. Need to walk in hospital room? [] 1   [] 2   [x] 3   [] 4   6. Climbing 3-5 steps with a railing? [] 1   [] 2   [x] 3   [] 4   © 2007, Trustees of [facility], under license to [employer]. All rights reserved     Score:  Initial: 18 Most Recent: X (Date: -- )    Interpretation of Tool:  Represents activities that are increasingly more difficult (i.e. Bed mobility, Transfers, Gait).     PLAN:   FREQUENCY/DURATION: PT Plan of Care: 3 times/week for duration of hospital stay or until stated goals are met, whichever comes first.    PROBLEM LIST:   (Skilled intervention is medically necessary to address:)  1. Decreased ADL/Functional Activities  2. Decreased Activity Tolerance  3. Decreased AROM/PROM  4. Decreased Balance  5. Decreased Cognition  6. Decreased Coordination  7. Decreased Gait Ability  8. Decreased Strength  9. Decreased Transfer Abilities  10. Increased Pain   INTERVENTIONS PLANNED:   (Benefits and precautions of physical therapy have been discussed with the patient.)  1. Therapeutic Activity  2. Therapeutic Exercise/HEP  3. Neuromuscular Re-education  4. Gait Training  5. Education     TREATMENT:     EVALUATION: High Complexity : (Untimed Charge)    TREATMENT:   ($$ Therapeutic Activity: 8-22 mins    )  Therapeutic Activity (15 Minutes): Therapeutic activity included Rolling, Supine to Sit, Sit to Supine, Scooting, Lateral Scooting, Transfer Training, Ambulation on level ground, Sitting balance  and Standing balance to improve functional Mobility, Strength and Activity tolerance.     TREATMENT GRID:  N/A    AFTER TREATMENT POSITION/PRECAUTIONS:  Chair, Needs within reach and RN notified    INTERDISCIPLINARY COLLABORATION:  RN/PCT and PT/PTA    TOTAL TREATMENT DURATION:  PT Patient Time In/Time Out  Time In: 1105  Time Out: 1140    Jose Manuel Turner DPT

## 2021-02-26 NOTE — PROGRESS NOTES
Chart screened by CM for d/c planning. Pt is currently requiring 60L O2 heated HFNC Airvo 100%. PT and OT evals were ordered on 2-. PT has not yet evaluated pt. OT attempted to eval yesterday but was unable due to pt's high O2 needs. Current d/c plan undetermined. Receiving Remdesivir (day 5/5). No immediate needs identified. CM will continue to follow and remain available if any needs arise.

## 2021-02-26 NOTE — PROGRESS NOTES
Progress Note    Patient: Elmer Aj MRN: [id number]  SSN: [id number]    YOB: 1960  Age: 64 y.o. Sex: male      Admit Date: 2/21/2021    LOS: 5 days     Subjective: Follow-up COVID    \"58 y.o. male with medical h/o anxiety and GERD who was seen in the ER on February 18, 2021 and diagnosed with COVID-19 pneumonia. He was discharged to home but per him he continued to get progressively worse and short of breath. Per the patient he denies entire family are sick with the COVID-19. They did have a gathering for the Super Bowl and this is where he thinks he may have gotten infected. \" S/p convalescent plasma on 2/22. Last dose of remdesivir 2/25. Maxed on airvo. S/p Ceftriaxone and doxycycline one dose.       Currently on 60L 100%. Less SOB, weak, nauseated and still not eating well. Having diarrhea but improved. Not proning. Having abdominal cramping.    Current Facility-Administered Medications   Medication Dose Route Frequency    loperamide (IMODIUM) capsule 2 mg  2 mg Oral Q4H PRN    dexamethasone (DECADRON) 10 mg/mL injection 6 mg  6 mg IntraVENous Q12H    LORazepam (ATIVAN) tablet 1 mg  1 mg Oral Q6H PRN    0.9% sodium chloride infusion 250 mL  250 mL IntraVENous PRN    amitriptyline (ELAVIL) tablet 10 mg  10 mg Oral QHS    benzonatate (TESSALON) capsule 100 mg  100 mg Oral TID PRN    famotidine (PEPCID) tablet 20 mg  20 mg Oral BID    guaiFENesin-codeine (ROBITUSSIN AC) 100-10 mg/5 mL solution 5 mL  5 mL Oral TID PRN    montelukast (SINGULAIR) tablet 10 mg  10 mg Oral DAILY    tiZANidine (ZANAFLEX) tablet 4 mg  4 mg Oral TID PRN    zolpidem (AMBIEN) tablet 5 mg  5 mg Oral QHS PRN    hyoscyamine SL (LEVSIN/SL) tablet 0.25 mg  0.25 mg SubLINGual Q4H PRN    enoxaparin (LOVENOX) injection 30 mg  30 mg SubCUTAneous Q12H    acetaminophen (TYLENOL) tablet 650 mg  650 mg Oral Q6H PRN    Or    acetaminophen (TYLENOL) suppository 650 mg  650 mg Rectal Q6H PRN    cholecalciferol (VITAMIN D3) (1000 Units /25 mcg) tablet 2,000 Units  2,000 Units Oral DAILY    albuterol (PROVENTIL HFA, VENTOLIN HFA, PROAIR HFA) inhaler 2 Puff  2 Puff Inhalation Q6H RT    ascorbic acid (vitamin C) (VITAMIN C) tablet 1,000 mg  1,000 mg Oral BID    guaiFENesin (ROBITUSSIN) 100 mg/5 mL oral liquid 200 mg  200 mg Oral Q4H PRN    ondansetron (ZOFRAN) injection 4 mg  4 mg IntraVENous Q6H PRN    zinc sulfate (ZINCATE) 220 (50) mg capsule 1 Cap  1 Cap Oral DAILY       Objective:     Vitals:    02/25/21 2342 02/26/21 0306 02/26/21 0325 02/26/21 0727   BP: 106/79  110/74 108/77   Pulse: 100  95 (!) 106   Resp: 20 20 19   Temp: 99.9 °F (37.7 °C)  99.5 °F (37.5 °C) 99.2 °F (37.3 °C)   SpO2: 93% 94% 93% 96%   Weight:       Height:             Intake and Output:  Current Shift: 02/26 0701 - 02/26 1900  In: -   Out: 400 [Urine:400]  Last three shifts: 02/24 1901 - 02/26 0700  In: -   Out: 400 [Urine:400]    Physical Exam:   General:  Alert, cooperative, no distress, weak appearing. Eyes:  Conjunctivae/corneas clear. Ears:  Normal TMs and external ear canals both ears. Nose: Nares normal. Septum midline. Mouth/Throat: Lips, mucosa, and tongue normal.    Neck:  no JVD. Back:   deferred   Lungs:   Clear to auscultation bilaterally but limited breathe sounds. Shallow breathing. No respiratory distress. Heart:  Regular rate and rhythm, S1, S2 normal   Abdomen:   Soft, non-tender. Bowel sounds normal.    Extremities: Extremities normal, atraumatic, no cyanosis or edema. Pulses: 2+ and symmetric all extremities. Skin: Skin color, texture, turgor normal. No rashes or lesions   Lymph nodes: Cervical, supraclavicular, and axillary nodes normal.   Neurologic: CNII-XII intact. Not moving well in bed. Lab/Data Review:    No results found for this or any previous visit (from the past 24 hour(s)).     Assessment/ Plan:     Principal Problem:    Respiratory failure with hypoxia ([address] (2/21/2021)    Active Problems:    COVID-19 (2/21/2021)    COVID - s/p CP. S/p remdesivir. Albuterol q6hr. Vitamin C, Vitamin D, Zinc. Decadron BID for steroid burst since 2/25. PPI. Stated to prone and use IS. Left renal  Mass - Chronic and he states he has nephrologist who follows. Nausea - PRN Zofran. Diarrhea - Added PRN immodium    Consulted PT/OT but they are waiting on respiratory status to improve     Slow progression. I have updated the daughter, Ash. Stated guarded prognosis which she understands.      DVT prophylaxis - Lovenox   Signed By: Lucinda Wilkes,      February 26, 2021

## 2021-02-27 LAB
ANION GAP SERPL CALC-SCNC: 8 MMOL/L (ref 7–16)
BUN SERPL-MCNC: 22 MG/DL (ref 6–23)
CALCIUM SERPL-MCNC: 8.5 MG/DL (ref 8.3–10.4)
CHLORIDE SERPL-SCNC: 107 MMOL/L (ref 98–107)
CO2 SERPL-SCNC: 26 MMOL/L (ref 21–32)
CREAT SERPL-MCNC: 0.72 MG/DL (ref 0.8–1.5)
GLUCOSE SERPL-MCNC: 143 MG/DL (ref 65–100)
HCT VFR BLD AUTO: 40.3 % (ref 41.1–50.3)
HGB BLD-MCNC: 13.7 G/DL (ref 13.6–17.2)
POTASSIUM SERPL-SCNC: 4.2 MMOL/L (ref 3.5–5.1)
SODIUM SERPL-SCNC: 141 MMOL/L (ref 136–145)

## 2021-02-27 PROCEDURE — 85018 HEMOGLOBIN: CPT

## 2021-02-27 PROCEDURE — 74011250636 HC RX REV CODE- 250/636: Performed by: INTERNAL MEDICINE

## 2021-02-27 PROCEDURE — 74011000302 HC RX REV CODE- 302: Performed by: FAMILY MEDICINE

## 2021-02-27 PROCEDURE — 77030021668 HC NEB PREFIL KT VYRM -A

## 2021-02-27 PROCEDURE — 77010033711 HC HIGH FLOW OXYGEN

## 2021-02-27 PROCEDURE — 80048 BASIC METABOLIC PNL TOTAL CA: CPT

## 2021-02-27 PROCEDURE — 65270000029 HC RM PRIVATE

## 2021-02-27 PROCEDURE — 36415 COLL VENOUS BLD VENIPUNCTURE: CPT

## 2021-02-27 PROCEDURE — 74011250637 HC RX REV CODE- 250/637: Performed by: INTERNAL MEDICINE

## 2021-02-27 PROCEDURE — 94762 N-INVAS EAR/PLS OXIMTRY CONT: CPT

## 2021-02-27 PROCEDURE — 86580 TB INTRADERMAL TEST: CPT | Performed by: FAMILY MEDICINE

## 2021-02-27 PROCEDURE — 94640 AIRWAY INHALATION TREATMENT: CPT

## 2021-02-27 PROCEDURE — 74011250636 HC RX REV CODE- 250/636: Performed by: FAMILY MEDICINE

## 2021-02-27 RX ADMIN — FAMOTIDINE 20 MG: 20 TABLET ORAL at 17:43

## 2021-02-27 RX ADMIN — VITAMIN D, TAB 1000IU (100/BT) 2000 UNITS: 25 TAB at 08:28

## 2021-02-27 RX ADMIN — ALBUTEROL SULFATE 2 PUFF: 108 INHALANT RESPIRATORY (INHALATION) at 07:36

## 2021-02-27 RX ADMIN — ALBUTEROL SULFATE 2 PUFF: 108 INHALANT RESPIRATORY (INHALATION) at 19:20

## 2021-02-27 RX ADMIN — OXYCODONE HYDROCHLORIDE AND ACETAMINOPHEN 1000 MG: 500 TABLET ORAL at 17:43

## 2021-02-27 RX ADMIN — ENOXAPARIN SODIUM 30 MG: 100 INJECTION SUBCUTANEOUS at 11:56

## 2021-02-27 RX ADMIN — OXYCODONE HYDROCHLORIDE AND ACETAMINOPHEN 1000 MG: 500 TABLET ORAL at 08:29

## 2021-02-27 RX ADMIN — MONTELUKAST 10 MG: 10 TABLET, FILM COATED ORAL at 08:28

## 2021-02-27 RX ADMIN — TUBERCULIN PURIFIED PROTEIN DERIVATIVE 5 UNITS: 5 INJECTION, SOLUTION INTRADERMAL at 12:00

## 2021-02-27 RX ADMIN — ENOXAPARIN SODIUM 30 MG: 100 INJECTION SUBCUTANEOUS at 00:02

## 2021-02-27 RX ADMIN — DEXAMETHASONE SODIUM PHOSPHATE 6 MG: 10 INJECTION INTRAMUSCULAR; INTRAVENOUS at 21:15

## 2021-02-27 RX ADMIN — DEXAMETHASONE SODIUM PHOSPHATE 6 MG: 10 INJECTION INTRAMUSCULAR; INTRAVENOUS at 08:27

## 2021-02-27 RX ADMIN — ZINC SULFATE 220 MG (50 MG) CAPSULE 1 CAPSULE: CAPSULE at 08:29

## 2021-02-27 RX ADMIN — AMITRIPTYLINE HYDROCHLORIDE 10 MG: 10 TABLET, FILM COATED ORAL at 21:15

## 2021-02-27 RX ADMIN — GUAIFENESIN AND CODEINE PHOSPHATE 5 ML: 100; 10 SOLUTION ORAL at 08:50

## 2021-02-27 RX ADMIN — ALBUTEROL SULFATE 2 PUFF: 108 INHALANT RESPIRATORY (INHALATION) at 02:30

## 2021-02-27 RX ADMIN — LORAZEPAM 1 MG: 1 TABLET ORAL at 08:50

## 2021-02-27 RX ADMIN — ALBUTEROL SULFATE 2 PUFF: 108 INHALANT RESPIRATORY (INHALATION) at 14:22

## 2021-02-27 RX ADMIN — FAMOTIDINE 20 MG: 20 TABLET ORAL at 08:29

## 2021-02-27 NOTE — PROGRESS NOTES
Progress Note    Patient: Yoli Augustin MRN: [id number]  SSN: [id number]    YOB: 1960  Age: 64 y.o. Sex: male      Admit Date: 2/21/2021    LOS: 6 days     Subjective: Follow-up COVID    \"58 y.o. male with medical h/o anxiety and GERD who was seen in the ER on February 18, 2021 and diagnosed with COVID-19 pneumonia. He was discharged to home but per him he continued to get progressively worse and short of breath. Per the patient he denies entire family are sick with the COVID-19. They did have a gathering for the [place] Bowl and this is where he thinks he may have gotten infected. \" S/p convalescent plasma on 2/22. Last dose of remdesivir 2/25. S/p Ceftriaxone and doxycycline one dose.       Currently on 57L 80%. Eating a little more. Less diarrhea. Starting to feel a little better.   Using IS and starting to prone more  Current Facility-Administered Medications   Medication Dose Route Frequency    tuberculin injection 5 Units  5 Units IntraDERMal ONCE    loperamide (IMODIUM) capsule 2 mg  2 mg Oral Q4H PRN    dexamethasone (DECADRON) 10 mg/mL injection 6 mg  6 mg IntraVENous Q12H    LORazepam (ATIVAN) tablet 1 mg  1 mg Oral Q6H PRN    0.9% sodium chloride infusion 250 mL  250 mL IntraVENous PRN    amitriptyline (ELAVIL) tablet 10 mg  10 mg Oral QHS    benzonatate (TESSALON) capsule 100 mg  100 mg Oral TID PRN    famotidine (PEPCID) tablet 20 mg  20 mg Oral BID    guaiFENesin-codeine (ROBITUSSIN AC) 100-10 mg/5 mL solution 5 mL  5 mL Oral TID PRN    montelukast (SINGULAIR) tablet 10 mg  10 mg Oral DAILY    tiZANidine (ZANAFLEX) tablet 4 mg  4 mg Oral TID PRN    zolpidem (AMBIEN) tablet 5 mg  5 mg Oral QHS PRN    hyoscyamine SL (LEVSIN/SL) tablet 0.25 mg  0.25 mg SubLINGual Q4H PRN    enoxaparin (LOVENOX) injection 30 mg  30 mg SubCUTAneous Q12H    acetaminophen (TYLENOL) tablet 650 mg  650 mg Oral Q6H PRN    Or    acetaminophen (TYLENOL) suppository 650 mg  650 mg Rectal Q6H PRN    cholecalciferol (VITAMIN D3) (1000 Units /25 mcg) tablet 2,000 Units  2,000 Units Oral DAILY    albuterol (PROVENTIL HFA, VENTOLIN HFA, PROAIR HFA) inhaler 2 Puff  2 Puff Inhalation Q6H RT    ascorbic acid (vitamin C) (VITAMIN C) tablet 1,000 mg  1,000 mg Oral BID    guaiFENesin (ROBITUSSIN) 100 mg/5 mL oral liquid 200 mg  200 mg Oral Q4H PRN    ondansetron (ZOFRAN) injection 4 mg  4 mg IntraVENous Q6H PRN    zinc sulfate (ZINCATE) 220 (50) mg capsule 1 Cap  1 Cap Oral DAILY       Objective:     Vitals:    02/27/21 0230 02/27/21 0319 02/27/21 0728 02/27/21 0736   BP:  112/83 106/72    Pulse:  100 85 96   Resp:  22 19    Temp:  97.7 °F (36.5 °C) 98.1 °F (36.7 °C)    SpO2: 93% 97% 90% 90%   Weight:       Height:             Intake and Output:  Current Shift: 02/27 0701 - 02/27 1900  In: -   Out: 250 [Urine:250]  Last three shifts: 02/25 1901 - 02/27 0700  In: -   Out: 1200 [Urine:1200]    Physical Exam:   General:  Alert, cooperative, no distress, more talkative. More alert. Eyes:  Conjunctivae/corneas clear. Ears:  Normal TMs and external ear canals both ears. Nose: Nares normal. Septum midline. Mouth/Throat: Lips, mucosa, and tongue normal.    Neck:  no JVD. Back:   deferred   Lungs:   Clear to auscultation bilaterally but limited breathe sounds. No respiratory distress. Heart:  Regular rate and rhythm, S1, S2 normal   Abdomen:   Soft, non-tender. Bowel sounds normal.    Extremities: Extremities normal, atraumatic, no cyanosis or edema. Pulses: 2+ and symmetric all extremities. Skin: Skin color, texture, turgor normal. No rashes or lesions   Lymph nodes: Cervical, supraclavicular, and axillary nodes normal.   Neurologic: CNII-XII intact. Not moving well in bed.         Lab/Data Review:    Recent Results (from the past 24 hour(s))   METABOLIC PANEL, BASIC    Collection Time: 02/27/21  7:23 AM   Result Value Ref Range    Sodium 141 136 - 145 mmol/L    Potassium 4.2 3.5 - 5.1 mmol/L    Chloride 107 98 - 107 mmol/L    CO2 26 21 - 32 mmol/L    Anion gap 8 7 - 16 mmol/L    Glucose 143 (H) 65 - 100 mg/dL    BUN 22 6 - 23 MG/DL    Creatinine 0.72 (L) 0.8 - 1.5 MG/DL    GFR est AA >60 >60 ml/min/1.73m2    GFR est non-AA >60 >60 ml/min/1.73m2    Calcium 8.5 8.3 - 10.4 MG/DL   HGB & HCT    Collection Time: 02/27/21  7:23 AM   Result Value Ref Range    HGB 13.7 13.6 - 17.2 g/dL    HCT 40.3 (L) 41.1 - 50.3 %       Assessment/ Plan:     Principal Problem:    Respiratory failure with hypoxia ([facility] [address] (2/21/2021)    Active Problems:    COVID-19 (2/21/2021)    COVID - s/p CP. S/p remdesivir. Albuterol q6hr. Vitamin C, Vitamin D, Zinc. Decadron BID for steroid burst since 2/25. PPI. Stated to prone and use IS. Left renal  Mass - Chronic and he states he has nephrologist who follows. Nausea - PRN Zofran. Diarrhea - PRN immodium     Slow progression but improving. I have updated the daughter, Mendel Kayser 2/26. Stated guarded prognosis which she understands.      DVT prophylaxis - Lovenox   Signed By: Yasmine Samayoa DO     February 27, 2021

## 2021-02-27 NOTE — ROUTINE PROCESS
Pt c/o rib pain and soreness from coughing so much and robitussin AC given PO as ordered prn. Pt also requested ativan 1mg and was given at this time PO. Will monitor for effectiveness.

## 2021-02-28 LAB
MM INDURATION POC: 0 MM (ref 0–5)
PPD POC: NEGATIVE NEGATIVE

## 2021-02-28 PROCEDURE — 94762 N-INVAS EAR/PLS OXIMTRY CONT: CPT

## 2021-02-28 PROCEDURE — 77010033711 HC HIGH FLOW OXYGEN

## 2021-02-28 PROCEDURE — 94640 AIRWAY INHALATION TREATMENT: CPT

## 2021-02-28 PROCEDURE — 65270000029 HC RM PRIVATE

## 2021-02-28 PROCEDURE — 74011250636 HC RX REV CODE- 250/636: Performed by: FAMILY MEDICINE

## 2021-02-28 PROCEDURE — 74011250637 HC RX REV CODE- 250/637: Performed by: INTERNAL MEDICINE

## 2021-02-28 PROCEDURE — 74011250636 HC RX REV CODE- 250/636: Performed by: INTERNAL MEDICINE

## 2021-02-28 RX ADMIN — VITAMIN D, TAB 1000IU (100/BT) 2000 UNITS: 25 TAB at 08:49

## 2021-02-28 RX ADMIN — ZINC SULFATE 220 MG (50 MG) CAPSULE 1 CAPSULE: CAPSULE at 08:49

## 2021-02-28 RX ADMIN — AMITRIPTYLINE HYDROCHLORIDE 10 MG: 10 TABLET, FILM COATED ORAL at 21:44

## 2021-02-28 RX ADMIN — OXYCODONE HYDROCHLORIDE AND ACETAMINOPHEN 1000 MG: 500 TABLET ORAL at 08:49

## 2021-02-28 RX ADMIN — ALBUTEROL SULFATE 2 PUFF: 108 INHALANT RESPIRATORY (INHALATION) at 14:45

## 2021-02-28 RX ADMIN — LORAZEPAM 1 MG: 1 TABLET ORAL at 21:52

## 2021-02-28 RX ADMIN — BENZONATATE 100 MG: 100 CAPSULE ORAL at 08:49

## 2021-02-28 RX ADMIN — OXYCODONE HYDROCHLORIDE AND ACETAMINOPHEN 1000 MG: 500 TABLET ORAL at 17:23

## 2021-02-28 RX ADMIN — BENZONATATE 100 MG: 100 CAPSULE ORAL at 17:23

## 2021-02-28 RX ADMIN — ALBUTEROL SULFATE 2 PUFF: 108 INHALANT RESPIRATORY (INHALATION) at 02:10

## 2021-02-28 RX ADMIN — MONTELUKAST 10 MG: 10 TABLET, FILM COATED ORAL at 08:49

## 2021-02-28 RX ADMIN — DEXAMETHASONE SODIUM PHOSPHATE 6 MG: 10 INJECTION INTRAMUSCULAR; INTRAVENOUS at 21:44

## 2021-02-28 RX ADMIN — FAMOTIDINE 20 MG: 20 TABLET ORAL at 17:23

## 2021-02-28 RX ADMIN — FAMOTIDINE 20 MG: 20 TABLET ORAL at 08:49

## 2021-02-28 RX ADMIN — ALBUTEROL SULFATE 2 PUFF: 108 INHALANT RESPIRATORY (INHALATION) at 07:43

## 2021-02-28 RX ADMIN — ALBUTEROL SULFATE 2 PUFF: 108 INHALANT RESPIRATORY (INHALATION) at 20:21

## 2021-02-28 RX ADMIN — ENOXAPARIN SODIUM 30 MG: 100 INJECTION SUBCUTANEOUS at 11:18

## 2021-02-28 RX ADMIN — ENOXAPARIN SODIUM 30 MG: 100 INJECTION SUBCUTANEOUS at 00:14

## 2021-02-28 RX ADMIN — DEXAMETHASONE SODIUM PHOSPHATE 6 MG: 10 INJECTION INTRAMUSCULAR; INTRAVENOUS at 08:49

## 2021-02-28 NOTE — PROGRESS NOTES
Progress Note    Patient: Aubrey Matthews MRN: [id number]  SSN: [id number]    YOB: 1960  Age: 64 y.o. Sex: male      Admit Date: 2/21/2021    LOS: 7 days     Subjective: Follow-up COVID    \"58 y.o. male with medical h/o anxiety and GERD who was seen in the ER on February 18, 2021 and diagnosed with COVID-19 pneumonia. He was discharged to home but per him he continued to get progressively worse and short of breath. Per the patient he denies entire family are sick with the COVID-19. They did have a gathering for the [place] Bowl and this is where he thinks he may have gotten infected. \" S/p convalescent plasma on 2/22. Last dose of remdesivir 2/25. S/p Ceftriaxone and doxycycline one dose.       Currently on 57L 78%. No diarrhea. Starting to feel a little better but having back pain and neck pain today.   Using IS and starting to prone more  Current Facility-Administered Medications   Medication Dose Route Frequency    tuberculin injection 5 Units  5 Units IntraDERMal ONCE    loperamide (IMODIUM) capsule 2 mg  2 mg Oral Q4H PRN    dexamethasone (DECADRON) 10 mg/mL injection 6 mg  6 mg IntraVENous Q12H    LORazepam (ATIVAN) tablet 1 mg  1 mg Oral Q6H PRN    0.9% sodium chloride infusion 250 mL  250 mL IntraVENous PRN    amitriptyline (ELAVIL) tablet 10 mg  10 mg Oral QHS    benzonatate (TESSALON) capsule 100 mg  100 mg Oral TID PRN    famotidine (PEPCID) tablet 20 mg  20 mg Oral BID    guaiFENesin-codeine (ROBITUSSIN AC) 100-10 mg/5 mL solution 5 mL  5 mL Oral TID PRN    montelukast (SINGULAIR) tablet 10 mg  10 mg Oral DAILY    tiZANidine (ZANAFLEX) tablet 4 mg  4 mg Oral TID PRN    zolpidem (AMBIEN) tablet 5 mg  5 mg Oral QHS PRN    hyoscyamine SL (LEVSIN/SL) tablet 0.25 mg  0.25 mg SubLINGual Q4H PRN    enoxaparin (LOVENOX) injection 30 mg  30 mg SubCUTAneous Q12H    acetaminophen (TYLENOL) tablet 650 mg  650 mg Oral Q6H PRN    Or    acetaminophen (TYLENOL) suppository 650 mg  650 mg Rectal Q6H PRN    cholecalciferol (VITAMIN D3) (1000 Units /25 mcg) tablet 2,000 Units  2,000 Units Oral DAILY    albuterol (PROVENTIL HFA, VENTOLIN HFA, PROAIR HFA) inhaler 2 Puff  2 Puff Inhalation Q6H RT    ascorbic acid (vitamin C) (VITAMIN C) tablet 1,000 mg  1,000 mg Oral BID    guaiFENesin (ROBITUSSIN) 100 mg/5 mL oral liquid 200 mg  200 mg Oral Q4H PRN    ondansetron (ZOFRAN) injection 4 mg  4 mg IntraVENous Q6H PRN    zinc sulfate (ZINCATE) 220 (50) mg capsule 1 Cap  1 Cap Oral DAILY       Objective:     Vitals:    02/27/21 2354 02/28/21 0210 02/28/21 0445 02/28/21 0743   BP: 94/64  96/60    Pulse:    100   Resp: 18      Temp: 98.1 °F (36.7 °C)  98 °F (36.7 °C)    SpO2: 91% 92% 93% 90%   Weight:       Height:             Intake and Output:  Current Shift: No intake/output data recorded. Last three shifts: 02/26 1901 - 02/28 0700  In: -   Out: 1350 [Urine:1350]    Physical Exam:   General:  Alert, cooperative, no distress, talkative. Coughing   Eyes:  Conjunctivae/corneas clear. Ears:  Normal TMs and external ear canals both ears. Nose: Nares normal. Septum midline. Mouth/Throat: Lips, mucosa, and tongue normal.    Neck:  no JVD. Back:   deferred   Lungs:   Clear to auscultation bilaterally with more air movement. No respiratory distress. Heart:  Regular rate and rhythm, S1, S2 normal   Abdomen:   Soft, non-tender. Bowel sounds normal.    Extremities: Extremities normal, atraumatic, no cyanosis or edema. Pulses: 2+ and symmetric all extremities. Skin: Skin color, texture, turgor normal. No rashes or lesions   Lymph nodes: Cervical, supraclavicular, and axillary nodes normal.   Neurologic: CNII-XII intact. Moving more in bed. Lab/Data Review:    No results found for this or any previous visit (from the past 24 hour(s)).     Assessment/ Plan:     Principal Problem:    Respiratory failure with hypoxia ([address] (2/21/2021)    Active Problems:    COVID-19 (2/21/2021)    COVID - s/p CP. S/p remdesivir. Albuterol q6hr. Vitamin C, Vitamin D, Zinc. Decadron BID for steroid burst since 2/25. PPI. Stated to prone and use IS. Left renal  Mass - Chronic and he states he has nephrologist who follows. Nausea - PRN Zofran. Diarrhea - PRN immodium     Slow progression but improving. Hoping for potential dc to rehab end of next week. I have updated the daughter, Alon Stager 2/28 along with his Oanh Cordial    PRN tylenol for pain and Zanaflex.      DVT prophylaxis - Lovenox   Signed By: Geo Patel, DO     February 28, 2021

## 2021-02-28 NOTE — PROGRESS NOTES
Pt is not C/O abdominal discomfort but has not had a BM since 02/23/2021. Pt prefers to drink prune juice mixed with apple juice. I have given him two cups. Pt states he feels ok and is in no discomfort. No BM on my shift.

## 2021-03-01 LAB
MM INDURATION POC: 0 MM (ref 0–5)
PPD POC: NEGATIVE NEGATIVE

## 2021-03-01 PROCEDURE — 97530 THERAPEUTIC ACTIVITIES: CPT

## 2021-03-01 PROCEDURE — 77010033711 HC HIGH FLOW OXYGEN

## 2021-03-01 PROCEDURE — 94640 AIRWAY INHALATION TREATMENT: CPT

## 2021-03-01 PROCEDURE — 65270000029 HC RM PRIVATE

## 2021-03-01 PROCEDURE — 74011250637 HC RX REV CODE- 250/637: Performed by: FAMILY MEDICINE

## 2021-03-01 PROCEDURE — 74011250637 HC RX REV CODE- 250/637: Performed by: INTERNAL MEDICINE

## 2021-03-01 PROCEDURE — 94762 N-INVAS EAR/PLS OXIMTRY CONT: CPT

## 2021-03-01 PROCEDURE — 97535 SELF CARE MNGMENT TRAINING: CPT

## 2021-03-01 PROCEDURE — 74011250636 HC RX REV CODE- 250/636: Performed by: FAMILY MEDICINE

## 2021-03-01 PROCEDURE — 74011250636 HC RX REV CODE- 250/636: Performed by: INTERNAL MEDICINE

## 2021-03-01 RX ORDER — NYSTATIN 100000 [USP'U]/ML
500000 SUSPENSION ORAL 4 TIMES DAILY
Status: DISCONTINUED | OUTPATIENT
Start: 2021-03-01 | End: 2021-03-11

## 2021-03-01 RX ADMIN — ZINC SULFATE 220 MG (50 MG) CAPSULE 1 CAPSULE: CAPSULE at 08:42

## 2021-03-01 RX ADMIN — ALBUTEROL SULFATE 2 PUFF: 108 INHALANT RESPIRATORY (INHALATION) at 02:30

## 2021-03-01 RX ADMIN — OXYCODONE HYDROCHLORIDE AND ACETAMINOPHEN 1000 MG: 500 TABLET ORAL at 17:38

## 2021-03-01 RX ADMIN — FAMOTIDINE 20 MG: 20 TABLET ORAL at 08:42

## 2021-03-01 RX ADMIN — FAMOTIDINE 20 MG: 20 TABLET ORAL at 17:38

## 2021-03-01 RX ADMIN — ALBUTEROL SULFATE 2 PUFF: 108 INHALANT RESPIRATORY (INHALATION) at 07:32

## 2021-03-01 RX ADMIN — LORAZEPAM 1 MG: 1 TABLET ORAL at 15:32

## 2021-03-01 RX ADMIN — ENOXAPARIN SODIUM 30 MG: 100 INJECTION SUBCUTANEOUS at 23:57

## 2021-03-01 RX ADMIN — NYSTATIN 500000 UNITS: 100000 SUSPENSION ORAL at 17:38

## 2021-03-01 RX ADMIN — ENOXAPARIN SODIUM 30 MG: 100 INJECTION SUBCUTANEOUS at 12:15

## 2021-03-01 RX ADMIN — NYSTATIN 500000 UNITS: 100000 SUSPENSION ORAL at 15:32

## 2021-03-01 RX ADMIN — ALBUTEROL SULFATE 2 PUFF: 108 INHALANT RESPIRATORY (INHALATION) at 15:08

## 2021-03-01 RX ADMIN — NYSTATIN 500000 UNITS: 100000 SUSPENSION ORAL at 21:48

## 2021-03-01 RX ADMIN — VITAMIN D, TAB 1000IU (100/BT) 2000 UNITS: 25 TAB at 08:42

## 2021-03-01 RX ADMIN — DEXAMETHASONE SODIUM PHOSPHATE 6 MG: 10 INJECTION INTRAMUSCULAR; INTRAVENOUS at 21:48

## 2021-03-01 RX ADMIN — ENOXAPARIN SODIUM 30 MG: 100 INJECTION SUBCUTANEOUS at 00:08

## 2021-03-01 RX ADMIN — LORAZEPAM 1 MG: 1 TABLET ORAL at 21:48

## 2021-03-01 RX ADMIN — AMITRIPTYLINE HYDROCHLORIDE 10 MG: 10 TABLET, FILM COATED ORAL at 21:48

## 2021-03-01 RX ADMIN — MONTELUKAST 10 MG: 10 TABLET, FILM COATED ORAL at 08:42

## 2021-03-01 RX ADMIN — DEXAMETHASONE SODIUM PHOSPHATE 6 MG: 10 INJECTION INTRAMUSCULAR; INTRAVENOUS at 08:43

## 2021-03-01 RX ADMIN — ALBUTEROL SULFATE 2 PUFF: 108 INHALANT RESPIRATORY (INHALATION) at 19:19

## 2021-03-01 RX ADMIN — NYSTATIN 500000 UNITS: 100000 SUSPENSION ORAL at 12:14

## 2021-03-01 RX ADMIN — OXYCODONE HYDROCHLORIDE AND ACETAMINOPHEN 1000 MG: 500 TABLET ORAL at 08:42

## 2021-03-01 NOTE — PROGRESS NOTES
Comprehensive Nutrition Assessment    Type and Reason for Visit: Initial, RD nutrition re-screen/LOS  LOS Day 8    Nutrition Recommendations/Plan:    Continue with current diet   Continue with Ensure Enlive with all meals       Nutrition Assessment:   Nutrition History: Unable to obtain hx at this time      Nutrition Background: Patient presented with worsening shortness of breath. Respiratory failure related to covid-19. Daily Update:  Pt currently requiring 60L O2 heated HFNC Airvo 75%. Unable to speak with patient or RN today. Nutrition Related Findings:   NFPE deferred at this time      Current Nutrition Therapies:  DIET GI SOFT No options chosen  DIET NUTRITIONAL SUPPLEMENTS All Meals; Ensure Enlive ( )    Current Intake:   Average Meal Intake: 51-75% Average Supplement Intake: Unable to assess      Anthropometric Measures:  Height: 5' 8\" (172.7 cm)  Current Body Wt: 84 kg (185 lb 3 oz)(2/21), Weight source: Stated  BMI: 28.2, Overweight (BMI 25.0-29. 9)     Ideal Body Weight (lbs) (Calculated): 154 lbs (70 kg), 120.3 %          Edema: No data recorded   Estimated Daily Nutrient Needs:  Energy (kcal/day): 1363-4172 (Kcal/kg(20-25), Weight Used: Current(83.9))  Protein (g/day):  (20% kcals) Weight Used: (Current)  Fluid (ml/day):   (1 ml/kcal)    Nutrition Diagnosis:   · Predicted inadequate energy intake related to inadequate protein-energy intake, impaired respiratory function as evidenced by intake 51-75%(high oxygen needs)    Nutrition Interventions:   Food and/or Nutrient Delivery: Continue current diet, Continue oral nutrition supplement     Coordination of Nutrition Care: Continue to monitor while inpatient    Goals: Active Goal: Intake >75% of nutritional needs in 7 days    Nutrition Monitoring and Evaluation:      Food/Nutrient Intake Outcomes: Food and nutrient intake, Supplement intake  Physical Signs/Symptoms Outcomes: Biochemical data    Discharge Planning:     Too soon to determine    Electronically signed by Benjamin Quesada MS, RD, LD on 3/1/2021 at 2:51 PM.  Contact: [phone]

## 2021-03-01 NOTE — PROGRESS NOTES
Progress Note    Patient: Eliezer Frankel MRN: [id number]  SSN: [id number]    YOB: 1960  Age: 64 y.o. Sex: male      Admit Date: 2/21/2021    LOS: 8 days     Subjective: Follow-up COVID    \"58 y.o. male with medical h/o anxiety and GERD who was seen in the ER on February 18, 2021 and diagnosed with COVID-19 pneumonia. He was discharged to home but per him he continued to get progressively worse and short of breath. Per the patient he denies entire family are sick with the COVID-19. They did have a gathering for the [place] Bowl and this is where he thinks he may have gotten infected. \" S/p convalescent plasma on 2/22. Last dose of remdesivir 2/25. S/p Ceftriaxone and doxycycline one dose.       Currently on 60L 75%. No diarrhea. Feeling weaker today. Throat pain.    Current Facility-Administered Medications   Medication Dose Route Frequency    phenol throat spray (CHLORASEPTIC) 1 Spray  1 Spray Oral PRN    nystatin (MYCOSTATIN) 100,000 unit/mL oral suspension 500,000 Units  500,000 Units Oral QID    loperamide (IMODIUM) capsule 2 mg  2 mg Oral Q4H PRN    dexamethasone (DECADRON) 10 mg/mL injection 6 mg  6 mg IntraVENous Q12H    LORazepam (ATIVAN) tablet 1 mg  1 mg Oral Q6H PRN    0.9% sodium chloride infusion 250 mL  250 mL IntraVENous PRN    amitriptyline (ELAVIL) tablet 10 mg  10 mg Oral QHS    benzonatate (TESSALON) capsule 100 mg  100 mg Oral TID PRN    famotidine (PEPCID) tablet 20 mg  20 mg Oral BID    guaiFENesin-codeine (ROBITUSSIN AC) 100-10 mg/5 mL solution 5 mL  5 mL Oral TID PRN    montelukast (SINGULAIR) tablet 10 mg  10 mg Oral DAILY    tiZANidine (ZANAFLEX) tablet 4 mg  4 mg Oral TID PRN    zolpidem (AMBIEN) tablet 5 mg  5 mg Oral QHS PRN    hyoscyamine SL (LEVSIN/SL) tablet 0.25 mg  0.25 mg SubLINGual Q4H PRN    enoxaparin (LOVENOX) injection 30 mg  30 mg SubCUTAneous Q12H    acetaminophen (TYLENOL) tablet 650 mg  650 mg Oral Q6H PRN    Or    acetaminophen (TYLENOL) suppository 650 mg  650 mg Rectal Q6H PRN    cholecalciferol (VITAMIN D3) (1000 Units /25 mcg) tablet 2,000 Units  2,000 Units Oral DAILY    albuterol (PROVENTIL HFA, VENTOLIN HFA, PROAIR HFA) inhaler 2 Puff  2 Puff Inhalation Q6H RT    ascorbic acid (vitamin C) (VITAMIN C) tablet 1,000 mg  1,000 mg Oral BID    guaiFENesin (ROBITUSSIN) 100 mg/5 mL oral liquid 200 mg  200 mg Oral Q4H PRN    ondansetron (ZOFRAN) injection 4 mg  4 mg IntraVENous Q6H PRN    zinc sulfate (ZINCATE) 220 (50) mg capsule 1 Cap  1 Cap Oral DAILY       Objective:     Vitals:    03/01/21 0230 03/01/21 0304 03/01/21 0732 03/01/21 0744   BP:  113/81  107/75   Pulse:  81  (!) 103   Resp:  18  18   Temp:  97.9 °F (36.6 °C)  98.1 °F (36.7 °C)   SpO2: 92% 94% 93% 91%   Weight:       Height:             Intake and Output:  Current Shift: 03/01 0701 - 03/01 1900  In: 527 [P.O.:527]  Out: 350 [Urine:350]  Last three shifts: 02/27 1901 - 03/01 0700  In: 237 [P.O.:237]  Out: 1300 [Urine:1300]    Physical Exam:   General:  Alert, cooperative, weak appearing   Eyes:  Conjunctivae/corneas clear. Ears:  Normal TMs and external ear canals both ears. Nose: Nares normal. Septum midline. Mouth/Throat: Yeast on tongue    Neck:  no JVD. Back:   deferred   Lungs:   Clear to auscultation bilaterally. No respiratory distress. Heart:  Regular rate and rhythm, S1, S2 normal   Abdomen:   Soft, non-tender. Bowel sounds normal.    Extremities: Extremities normal, atraumatic, no cyanosis or edema. Pulses: 2+ and symmetric all extremities. Skin: Skin color, texture, turgor normal. No rashes or lesions   Lymph nodes: Cervical, supraclavicular, and axillary nodes normal.   Neurologic: CNII-XII intact. Lab/Data Review:    No results found for this or any previous visit (from the past 24 hour(s)).     Assessment/ Plan:     Principal Problem:    Respiratory failure with hypoxia ([facility] [address] (2/21/2021)    Active Problems:    COVID-19 (2/21/2021)    COVID - s/p CP. S/p remdesivir. Albuterol q6hr. Vitamin C, Vitamin D, Zinc. Decadron BID for steroid burst since 2/25. PPI. Stated to prone and use IS. Add nystatin. Left renal  Mass - Chronic and he states he has nephrologist who follows. Nausea - PRN Zofran. Diarrhea - PRN immodium     Slow progression. Hoping for potential dc to rehab end of week.      I have updated the daughter, Bhaskar Kaminski 2/28 along with his Mya Strange     DVT prophylaxis - Lovenox   Signed By: Daniel Villaseñor DO     March 1, 2021
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
## 2021-03-01 NOTE — PROGRESS NOTES
ACUTE OT GOALS:  (Developed with and agreed upon by patient and/or caregiver.)  1) Patient will complete lower body bathing and dressing with stand by assistance and adaptive equipment as needed. 2) Patient will complete toileting with stand by assistance. 3) Patient will complete functional transfers with stand by assistance and adaptive equipment as needed. 4) Patient will tolerate at least 25 minutes of OT activity with 1-2 rest breaks while maintaining O2 sats >90%. 5) Patient will verbalize at least 3 energy conservation technique to utilize during ADL/IADL. Timeframe: 7 visits     OCCUPATIONAL THERAPY: Daily Note OT Treatment Day # 1    Celio Irizarry is a 64 y.o. male   PRIMARY DIAGNOSIS: Respiratory failure with hypoxia ([facility] [address]  COVID-19 [U07.1]  Respiratory failure with hypoxia ([facility] [address] [J96.91]       Payor: [employer] / Plan: [employer] / Product Type: SERENA /   ASSESSMENT:     REHAB RECOMMENDATIONS: CURRENT LEVEL OF FUNCTION:  (Most Recently Demonstrated)   Recommendation to date pending progress:  Setting:   No further skilled therapy   Equipment:    To Be Determined Bathing:   Not tested  Dressing:   Set Up  Feeding/Grooming:   Set Up  Toileting:   Supervision  Functional Mobility:   Contact Guard Assistance     ASSESSMENT:  Mr. Melva Swartz was able to tolerate more activity today. He was able to take steps from bed to chair with CGA and maintain O2 >90%. Demonstrates ADLs with setup/ supervision. Biggest limiting factor is decreased activity tolerance secondary to respiratory status. Will continue efforts. SUBJECTIVE:   Mr. Melva Swartz states, \"I've got 3 grandkids on the way. \"    SOCIAL HISTORY/LIVING ENVIRONMENT:  Pt lives with family in a 2-story home but lives on 1st level. Pt has tub shower without shower chair and no AD at home. Pt works, drives and has family to assist if needed.    Home Environment: Private residence  One/Two Story Residence: Two story  Living Alone: No  Support Systems: Family member(s)    OBJECTIVE:     PAIN: VITAL SIGNS: LINES/DRAINS:   Pre Treatment: Pain Screen  Pain Scale 1: Numeric (0 - 10)  Pain Intensity 1: 0  Post Treatment: 0 Vital Signs  O2 Sat (%): 90 %  O2 Device: Heated; Hi flow nasal cannula  O2 Flow Rate (L/min): 60 l/min  FIO2 (%): 75 % IV  O2 Device: Heated, Hi flow nasal cannula     ACTIVITIES OF DAILY LIVING: I Mod I S SBA CGA Min Mod Max Total NT Comments   BASIC ADLs:              Bathing/ Showering [] [] [] [] [] [] [] [] [] [x]    Toileting [] [] [x] [] [] [] [] [] [] []    Dressing [] [] [x] [] [] [] [] [] [] []    Feeding [] [] [x] [] [] [] [] [] [] []    Grooming [] [] [x] [] [] [] [] [] [] []    Personal Device Care [x] [] [] [] [] [] [] [] [] []    Functional Mobility [] [] [] [] [x] [] [] [] [] []    I=Independent, Mod I=Modified Independent, S=Supervision, SBA=Standby Assistance, CGA=Contact Guard Assistance,   Min=Minimal Assistance, Mod=Moderate Assistance, Max=Maximal Assistance, Total=Total Assistance, NT=Not Tested    MOBILITY: I Mod I S SBA CGA Min Mod Max Total  NT x2 Comments:   Supine to sit [] [] [] [x] [] [] [] [] [] [] []    Sit to supine [] [] [] [] [] [] [] [] [] [x] []    Sit to stand [] [] [] [] [x] [] [] [] [] [] []    Bed to chair [] [] [] [] [x] [] [] [] [] [] []    I=Independent, Mod I=Modified Independent, S=Supervision, SBA=Standby Assistance, CGA=Contact Guard Assistance,   Min=Minimal Assistance, Mod=Moderate Assistance, Max=Maximal Assistance, Total=Total Assistance, NT=Not Tested    PLAN:   FREQUENCY/DURATION: OT Plan of Care: 3 times/week for duration of hospital stay or until stated goals are met, whichever comes first.    TREATMENT:   TREATMENT:   ($$ Self Care/Home Management: 8-22 mins$$ Therapeutic Activity: 8-22 mins   )  Therapeutic Activity (15 Minutes):  Therapeutic activity included Supine to Sit, Transfer Training, Ambulation on level ground, Sitting balance  and Standing balance to improve functional Mobility and Activity tolerance. Self Care (21 Minutes): Self care including Toileting, Lower Body Dressing, Self Feeding and Grooming to increase independence.     AFTER TREATMENT POSITION/PRECAUTIONS:  Chair, Needs within reach and RN notified    INTERDISCIPLINARY COLLABORATION:  RN/PCT and OT/NIELSEN    TOTAL TREATMENT DURATION:  OT Patient Time In/Time Out  Time In: 4785  Time Out: [address] Jr OTR/L

## 2021-03-01 NOTE — PROGRESS NOTES
Chart screened by CM for d/c planning. Pt currently requiring 60L O2 heated HFNC Airvo 75%. PT and OT notes do not recommend any further skilled therapy upon d/c. Current d/c plan is to return home and back to work when medically stable. LOS = 7 days. No immediate needs identified. CM will continue to follow and remain available if any needs arise.

## 2021-03-01 NOTE — PROGRESS NOTES
Shift Assessment. Pt is A&O x 4. Pt cardiac rhythm is regular. Pt lungs are diminished. Pt is on airvo 60L at 80%. Bowel sounds present. Pt denies pain and nausea. Pt is resting in bed with the bed in the lowest position and the call light within reach.

## 2021-03-01 NOTE — PROGRESS NOTES
ACUTE PHYSICAL THERAPY GOALS:  (Developed with and agreed upon by patient and/or caregiver. )  LTG:  (1.)Mr. Eddie John will move from supine to sit and sit to supine , scoot up and down, and roll side to side in bed with INDEPENDENT within 7 treatment day(s). (2.)Mr. Eddie John will transfer from bed to chair and chair to bed with SUPERVISION using the least restrictive device within 7 treatment day(s). (3.)Mr. Eddie John will ambulate with SUPERVISION for 100+ feet with the least restrictive device within 7 treatment day(s). (4.)Mr. Eddie John will perform sit-stand x5  INDEPENDENT without using UEs under 18 seconds within 7 treatment day(s). PHYSICAL THERAPY: Daily Note Treatment Day # 2    Ronnell Snellen is a 64 y.o. male   PRIMARY DIAGNOSIS: Respiratory failure with hypoxia ([address]  COVID-19 [U07.1]  Respiratory failure with hypoxia ([address] [J96.91]         ASSESSMENT:     REHAB RECOMMENDATIONS: CURRENT LEVEL OF FUNCTION:  (Most Recently Demonstrated)   Recommendation to date pending progress:  [address] [place] [address] Therapy  Equipment:    To Be Determined Bed Mobility:   Standby Assistance  Sit to Stand:  [employer] Assistance  Transfers:   Not tested  Gait/Mobility:   Not tested     ASSESSMENT:  Mr. Eddie John was prone in bed on Airvo and agreeable for PT but fatigued from being up this morning. He demonstrated progress with bed mobility and STS today. Sats in 90's most of treatment, but dropped to upper 80's a few times. .     SUBJECTIVE:   Mr. Eddie John states, \"I've been up all morning. 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 Desires to return when cleared.    Home Environment: Private residence  One/Two Story Residence: Two story  Living Alone: No  Support Systems: Family member(s)  OBJECTIVE:     PAIN: VITAL SIGNS: LINES/DRAINS:   Pre Treatment: Pain Screen  Pain Scale 1: Numeric (0 - 10)  Pain Intensity 1: 0  Post Treatment: 0   continuous O2 sat  O2 Device: Heated, Hi flow nasal cannula     MOBILITY: I Mod I S SBA CGA Min Mod Max Total  NT x2 Comments:   Bed Mobility    Rolling [] [] [] [x] [] [] [] [] [] [] []    Supine to Sit [] [] [] [x] [] [] [] [] [] [] []    Scooting [] [] [] [x] [] [] [] [] [] [] []    Sit to Supine [] [] [] [x] [] [] [] [] [] [] []    Transfers    Sit to Stand [] [] [] [] [x] [] [] [] [] [] []    Bed to Chair [] [] [] [] [] [] [] [] [] [x] []    Stand to Sit [] [] [] [] [x] [] [] [] [] [] []    I=Independent, Mod I=Modified Independent, S=Supervision, SBA=Standby Assistance, CGA=Contact Guard Assistance,   Min=Minimal Assistance, Mod=Moderate Assistance, Max=Maximal Assistance, Total=Total Assistance, NT=Not Tested    BALANCE: Good Fair+ Fair Fair- Poor NT Comments   Sitting Static [x] [] [] [] [] []    Sitting Dynamic [x] [] [] [] [] []              Standing Static [] [] [x] [] [] []    Standing Dynamic [] [] [x] [] [] []      GAIT: I Mod I S SBA CGA Min Mod Max Total  NT x2 Comments:   Level of Assistance [] [] [] [] [] [] [] [] [] [x] []    Distance 0    DME N/A    Gait Quality N/A    Weightbearing  Status N/A     I=Independent, Mod I=Modified Independent, S=Supervision, SBA=Standby Assistance, CGA=Contact Guard Assistance,   Min=Minimal Assistance, Mod=Moderate Assistance, Max=Maximal Assistance, Total=Total Assistance, NT=Not Tested    PLAN:   FREQUENCY/DURATION: PT Plan of Care: 3 times/week for duration of hospital stay or until stated goals are met, whichever comes first.  TREATMENT:     TREATMENT:   ($$ Therapeutic Activity: 23-37 mins    )  Therapeutic Activity (15 Minutes): Therapeutic activity included Rolling, Supine to Sit, Sit to Supine, Scooting, Transfer Training and exercises to improve functional Mobility, Strength and Activity tolerance.     TREATMENT GRID:  N/A    Date: 3/01/21        Ambulation  Device  assistance         Partial Squats         Hip Abduction/ Adduction Standing x10 AB        Heel Raises  Standing x10 AB        Toe Raises Standing x10 AB        Hip Flexion Standing x10 AB        Sit to Stand 4x        Long arc quads 2x10 AB        Ankle pumps x10 AB        Key:  R=right, L=left, B=bilaterally, A=active, AA=active assisted, P=passive    AFTER TREATMENT POSITION/PRECAUTIONS:  Bed, Needs within reach and RN notified    INTERDISCIPLINARY COLLABORATION:  RN/PCT and PT/PTA    TOTAL TREATMENT DURATION:  PT Patient Time In/Time Out  Time In: 1327  Time Out: [address] [facility], [place], PT, DPT

## 2021-03-01 NOTE — PROGRESS NOTES
's visit via telephone call attempted. The call was unanswered. Chaplains remain available follow-up.      Kenneth Goel MDIV, [facility]

## 2021-03-02 PROCEDURE — 74011250637 HC RX REV CODE- 250/637: Performed by: INTERNAL MEDICINE

## 2021-03-02 PROCEDURE — 94640 AIRWAY INHALATION TREATMENT: CPT

## 2021-03-02 PROCEDURE — 65270000029 HC RM PRIVATE

## 2021-03-02 PROCEDURE — 97530 THERAPEUTIC ACTIVITIES: CPT

## 2021-03-02 PROCEDURE — 74011250637 HC RX REV CODE- 250/637: Performed by: FAMILY MEDICINE

## 2021-03-02 PROCEDURE — 94762 N-INVAS EAR/PLS OXIMTRY CONT: CPT

## 2021-03-02 PROCEDURE — 77010033711 HC HIGH FLOW OXYGEN

## 2021-03-02 PROCEDURE — 74011250636 HC RX REV CODE- 250/636: Performed by: INTERNAL MEDICINE

## 2021-03-02 PROCEDURE — 74011250636 HC RX REV CODE- 250/636: Performed by: FAMILY MEDICINE

## 2021-03-02 RX ORDER — DEXAMETHASONE SODIUM PHOSPHATE 100 MG/10ML
6 INJECTION INTRAMUSCULAR; INTRAVENOUS DAILY
Status: DISCONTINUED | OUTPATIENT
Start: 2021-03-03 | End: 2021-03-03

## 2021-03-02 RX ADMIN — FAMOTIDINE 20 MG: 20 TABLET ORAL at 08:50

## 2021-03-02 RX ADMIN — ENOXAPARIN SODIUM 30 MG: 100 INJECTION SUBCUTANEOUS at 13:01

## 2021-03-02 RX ADMIN — NYSTATIN 500000 UNITS: 100000 SUSPENSION ORAL at 08:50

## 2021-03-02 RX ADMIN — NYSTATIN 500000 UNITS: 100000 SUSPENSION ORAL at 17:17

## 2021-03-02 RX ADMIN — ALBUTEROL SULFATE 2 PUFF: 108 INHALANT RESPIRATORY (INHALATION) at 08:21

## 2021-03-02 RX ADMIN — LORAZEPAM 1 MG: 1 TABLET ORAL at 18:45

## 2021-03-02 RX ADMIN — OXYCODONE HYDROCHLORIDE AND ACETAMINOPHEN 1000 MG: 500 TABLET ORAL at 08:50

## 2021-03-02 RX ADMIN — VITAMIN D, TAB 1000IU (100/BT) 2000 UNITS: 25 TAB at 08:50

## 2021-03-02 RX ADMIN — ONDANSETRON 4 MG: 2 INJECTION INTRAMUSCULAR; INTRAVENOUS at 18:45

## 2021-03-02 RX ADMIN — AMITRIPTYLINE HYDROCHLORIDE 10 MG: 10 TABLET, FILM COATED ORAL at 21:33

## 2021-03-02 RX ADMIN — DEXAMETHASONE SODIUM PHOSPHATE 6 MG: 10 INJECTION INTRAMUSCULAR; INTRAVENOUS at 08:51

## 2021-03-02 RX ADMIN — FAMOTIDINE 20 MG: 20 TABLET ORAL at 17:17

## 2021-03-02 RX ADMIN — OXYCODONE HYDROCHLORIDE AND ACETAMINOPHEN 1000 MG: 500 TABLET ORAL at 17:17

## 2021-03-02 RX ADMIN — ALBUTEROL SULFATE 2 PUFF: 108 INHALANT RESPIRATORY (INHALATION) at 02:34

## 2021-03-02 RX ADMIN — NYSTATIN 500000 UNITS: 100000 SUSPENSION ORAL at 13:01

## 2021-03-02 RX ADMIN — ALBUTEROL SULFATE 2 PUFF: 108 INHALANT RESPIRATORY (INHALATION) at 13:41

## 2021-03-02 RX ADMIN — MONTELUKAST 10 MG: 10 TABLET, FILM COATED ORAL at 08:50

## 2021-03-02 RX ADMIN — ZINC SULFATE 220 MG (50 MG) CAPSULE 1 CAPSULE: CAPSULE at 08:50

## 2021-03-02 RX ADMIN — NYSTATIN 500000 UNITS: 100000 SUSPENSION ORAL at 21:33

## 2021-03-02 NOTE — PROGRESS NOTES
Problem: Falls - Risk of  Goal: *Absence of Falls  Description: Document Bard Aiken Fall Risk and appropriate interventions in the flowsheet. Outcome: Progressing Towards Goal  Note: Fall Risk Interventions:  Mobility Interventions: Bed/chair exit alarm, Patient to call before getting OOB         Medication Interventions: Bed/chair exit alarm, Patient to call before getting OOB, Teach patient to arise slowly    Elimination Interventions: Bed/chair exit alarm, Call light in reach, Patient to call for help with toileting needs, Toileting schedule/hourly rounds    History of Falls Interventions: Bed/chair exit alarm, Investigate reason for fall         Problem: Patient Education: Go to Patient Education Activity  Goal: Patient/Family Education  Outcome: Progressing Towards Goal     Problem: Pressure Injury - Risk of  Goal: *Prevention of pressure injury  Description: Document Stephan Scale and appropriate interventions in the flowsheet.   Outcome: Progressing Towards Goal  Note: Pressure Injury Interventions:  Sensory Interventions: Keep linens dry and wrinkle-free, Maintain/enhance activity level, Minimize linen layers    Moisture Interventions: Absorbent underpads, Check for incontinence Q2 hours and as needed    Activity Interventions: Increase time out of bed    Mobility Interventions: PT/OT evaluation    Nutrition Interventions: Document food/fluid/supplement intake                     Problem: Patient Education: Go to Patient Education Activity  Goal: Patient/Family Education  Outcome: Progressing Towards Goal     Problem: Nausea/Vomiting (Adult)  Goal: *Absence of nausea/vomiting  Outcome: Progressing Towards Goal  Goal: *Palliation of nausea/vomiting (Palliative Care)  Outcome: Progressing Towards Goal     Problem: Breathing Pattern - Ineffective  Goal: Ability to achieve and maintain a regular respiratory rate  Outcome: Progressing Towards Goal     Problem: Isolation Precautions - Risk of Spread of Infection  Goal: Prevent transmission of infectious organism to others  Outcome: Progressing Towards Goal     Problem: Nutrition Deficits  Goal: Optimize nutrtional status  Outcome: Progressing Towards Goal     Problem: Risk for Fluid Volume Deficit  Goal: Maintain normal heart rhythm  Outcome: Progressing Towards Goal  Goal: Maintain absence of muscle cramping  Outcome: Progressing Towards Goal  Goal: Maintain normal serum potassium, sodium, calcium, phosphorus, and pH  Outcome: Progressing Towards Goal     Problem: Loneliness or Risk for Loneliness  Goal: Demonstrate positive use of time alone when socialization is not possible  Outcome: Progressing Towards Goal     Problem: Fatigue  Goal: Verbalize increase energy and improved vitality  Outcome: Progressing Towards Goal     Problem: Patient Education: Go to Patient Education Activity  Goal: Patient/Family Education  Outcome: Progressing Towards Goal     Problem: Nutrition Deficit  Goal: *Optimize nutritional status  Outcome: Progressing Towards Goal

## 2021-03-02 NOTE — PROGRESS NOTES
Progress Note    Patient: Harish Morgan MRN: [id number]  SSN: [id number]    YOB: 1960  Age: 64 y.o. Sex: male      Admit Date: 2/21/2021    LOS: 9 days     Subjective: Follow-up COVID    \"58 y.o. male with medical h/o anxiety and GERD who was seen in the ER on February 18, 2021 and diagnosed with COVID-19 pneumonia. He was discharged to home but per him he continued to get progressively worse and short of breath. Per the patient he denies entire family are sick with the COVID-19. They did have a gathering for the Super Bowl and this is where he thinks he may have gotten infected. \" S/p convalescent plasma on 2/22. Last dose of remdesivir 2/25. S/p Ceftriaxone and doxycycline one dose.       Currently on 60L 66%. Feeling like has to have BM. No SOB or chest pain.    Current Facility-Administered Medications   Medication Dose Route Frequency    phenol throat spray (CHLORASEPTIC) 1 Spray  1 Spray Oral PRN    nystatin (MYCOSTATIN) 100,000 unit/mL oral suspension 500,000 Units  500,000 Units Oral QID    loperamide (IMODIUM) capsule 2 mg  2 mg Oral Q4H PRN    dexamethasone (DECADRON) 10 mg/mL injection 6 mg  6 mg IntraVENous Q12H    LORazepam (ATIVAN) tablet 1 mg  1 mg Oral Q6H PRN    0.9% sodium chloride infusion 250 mL  250 mL IntraVENous PRN    amitriptyline (ELAVIL) tablet 10 mg  10 mg Oral QHS    benzonatate (TESSALON) capsule 100 mg  100 mg Oral TID PRN    famotidine (PEPCID) tablet 20 mg  20 mg Oral BID    guaiFENesin-codeine (ROBITUSSIN AC) 100-10 mg/5 mL solution 5 mL  5 mL Oral TID PRN    montelukast (SINGULAIR) tablet 10 mg  10 mg Oral DAILY    tiZANidine (ZANAFLEX) tablet 4 mg  4 mg Oral TID PRN    zolpidem (AMBIEN) tablet 5 mg  5 mg Oral QHS PRN    hyoscyamine SL (LEVSIN/SL) tablet 0.25 mg  0.25 mg SubLINGual Q4H PRN    enoxaparin (LOVENOX) injection 30 mg  30 mg SubCUTAneous Q12H    acetaminophen (TYLENOL) tablet 650 mg  650 mg Oral Q6H PRN    Or    acetaminophen (TYLENOL) suppository 650 mg  650 mg Rectal Q6H PRN    cholecalciferol (VITAMIN D3) (1000 Units /25 mcg) tablet 2,000 Units  2,000 Units Oral DAILY    albuterol (PROVENTIL HFA, VENTOLIN HFA, PROAIR HFA) inhaler 2 Puff  2 Puff Inhalation Q6H RT    ascorbic acid (vitamin C) (VITAMIN C) tablet 1,000 mg  1,000 mg Oral BID    guaiFENesin (ROBITUSSIN) 100 mg/5 mL oral liquid 200 mg  200 mg Oral Q4H PRN    ondansetron (ZOFRAN) injection 4 mg  4 mg IntraVENous Q6H PRN    zinc sulfate (ZINCATE) 220 (50) mg capsule 1 Cap  1 Cap Oral DAILY       Objective:     Vitals:    03/02/21 0235 03/02/21 0344 03/02/21 0800 03/02/21 0821   BP:  112/75 104/81    Pulse:  85 70    Resp:  18 18    Temp:  97.7 °F (36.5 °C) 98 °F (36.7 °C)    SpO2: 96% 97% 94% 90%   Weight:       Height:             Intake and Output:  Current Shift: No intake/output data recorded. Last three shifts: 02/28 1901 - 03/02 0700  In: 1356 [P.O.:1356]  Out: 1150 [Urine:1150]    Physical Exam:   General:  Alert, cooperative, sitting up in bed moving around well    Eyes:  Conjunctivae/corneas clear. Ears:  Normal TMs and external ear canals both ears. Nose: Nares normal. Septum midline. Mouth/Throat: Yeast on tongue    Neck:  no JVD. Back:   deferred   Lungs:   Clear to auscultation bilaterally. No respiratory distress. Heart:  Regular rate and rhythm, S1, S2 normal   Abdomen:   Soft, non-tender. Bowel sounds normal.    Extremities: Extremities normal, atraumatic, no cyanosis or edema. Pulses: 2+ and symmetric all extremities. Skin: Skin color, texture, turgor normal. No rashes or lesions   Lymph nodes: Cervical, supraclavicular, and axillary nodes normal.   Neurologic: CNII-XII intact. Lab/Data Review:    No results found for this or any previous visit (from the past 24 hour(s)).     Assessment/ Plan:     Principal Problem:    Respiratory failure with hypoxia ([address] (2/21/2021)    Active Problems:    COVID-19 (2/21/2021)    COVID - s/p CP. S/p remdesivir. Albuterol q6hr. Vitamin C, Vitamin D, Zinc. Decadron BID for steroid burst since 2/25 that I will change back to once daily. Likely dc steroids tomorrow since today makes 10 day treatment. PPI. Stated to prone and use IS. Added nystatin. Left renal  Mass - Chronic and he states he has nephrologist who follows. Nausea - PRN Zofran. Diarrhea - PRN immodium     Slow progression. Likely lengthy stay    I have updated the daughter, Micheline Taylor 3/2. Try to updated daughter again on 3/4 if possible by new provider.      DVT prophylaxis - Lovenox   Signed By: Balta Ybarra DO     March 2, 2021

## 2021-03-02 NOTE — PROGRESS NOTES
ACUTE PHYSICAL THERAPY GOALS:  (Developed with and agreed upon by patient and/or caregiver. )  LTG:  (1.)Mr. Jodie Santiago will move from supine to sit and sit to supine , scoot up and down, and roll side to side in bed with INDEPENDENT within 7 treatment day(s). (2.)Mr. Jodie Santiago will transfer from bed to chair and chair to bed with SUPERVISION using the least restrictive device within 7 treatment day(s). (3.)Mr. Jodie Santiago will ambulate with SUPERVISION for 100+ feet with the least restrictive device within 7 treatment day(s). (4.)Mr. Jodie Santiago will perform sit-stand x5  INDEPENDENT without using UEs under 18 seconds within 7 treatment day(s). PHYSICAL THERAPY: Daily Note Treatment Day # 3    Jazzy Seth is a 64 y.o. male   PRIMARY DIAGNOSIS: Respiratory failure with hypoxia ([address]  COVID-19 [U07.1]  Respiratory failure with hypoxia ([address] [J96.91]         ASSESSMENT:     REHAB RECOMMENDATIONS: CURRENT LEVEL OF FUNCTION:  (Most Recently Demonstrated)   Recommendation to date pending progress:  [address] [place] [address] Therapy  Equipment:    To Be Determined Bed Mobility:   Standby Assistance  Sit to Stand:   Contact Guard Assistance  Transfers:   Minimal Assistance  Gait/Mobility:   Minimal Assistance     ASSESSMENT:  Mr. Jodie Santiago was sitting edge of bed on Airvo and wanting to go to the bathroom. Ambulated 15'x2 but afterwards SpO2 mid 80's, recovered quickly with rest and pursed lip breathing. Sat dropped to 80's with standing ex's also. He demonstrated progress with activity tolerance and gait today. SUBJECTIVE:   Mr. Jodie Santiago states, Akosua Gregory' couldn't go. 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 Desires to return when cleared.    Home Environment: Private residence  One/Two Story Residence: Two story  Living Alone: No  Support Systems: Family member(s)  OBJECTIVE:     PAIN: VITAL SIGNS: LINES/DRAINS:   Pre Treatment: Pain Screen  Pain Scale 1: Numeric (0 - 10)  Pain Intensity 1: 0  Post Treatment: 0   continuous O2 sat  O2 Device: Heated, Hi flow nasal cannula     MOBILITY: I Mod I S SBA CGA Min Mod Max Total  NT x2 Comments:   Bed Mobility    Rolling [] [] [] [] [] [] [] [] [] [x] []    Supine to Sit [] [] [] [] [] [] [] [] [] [x] []    Scooting [] [] [] [] [] [] [] [] [] [x] []    Sit to Supine [] [] [] [] [] [] [] [] [] [x] []    Transfers    Sit to Stand [] [] [] [] [x] [] [] [] [] [] []    Bed to Chair [] [] [] [] [] [x] [] [] [] [] []    Stand to Sit [] [] [] [] [x] [] [] [] [] [] []    I=Independent, Mod I=Modified Independent, S=Supervision, SBA=Standby Assistance, CGA=Contact Guard Assistance,   Min=Minimal Assistance, Mod=Moderate Assistance, Max=Maximal Assistance, Total=Total Assistance, NT=Not Tested    BALANCE: Good Fair+ Fair Fair- Poor NT Comments   Sitting Static [x] [] [] [] [] []    Sitting Dynamic [x] [] [] [] [] []              Standing Static [] [] [x] [] [] []    Standing Dynamic [] [] [x] [] [] []      GAIT: I Mod I S SBA CGA Min Mod Max Total  NT x2 Comments:   Level of Assistance [] [] [] [] [] [x] [] [] [] [] []    Distance 15x2    DME hand hold assist    Gait Quality Slow, cautious, decreased step length. Weightbearing  Status N/A     I=Independent, Mod I=Modified Independent, S=Supervision, SBA=Standby Assistance, CGA=Contact Guard Assistance,   Min=Minimal Assistance, Mod=Moderate Assistance, Max=Maximal Assistance, Total=Total Assistance, NT=Not Tested    PLAN:   FREQUENCY/DURATION: PT Plan of Care: 3 times/week for duration of hospital stay or until stated goals are met, whichever comes first.  TREATMENT:     TREATMENT:   ($$ Therapeutic Activity: 23-37 mins    )  Therapeutic Activity (25 Minutes):  Therapeutic activity included Scooting, Transfer Training, Ambulation on level ground, Sitting balance , Standing balance and exercises to improve functional Mobility, Strength and Activity tolerance.     TREATMENT GRID:  N/A    Date: 3/01/21 3/02/21       Ambulation  Device  assistance         Partial Squats         Hip Abduction/ Adduction Standing x10 AB        Heel Raises  Standing x10 AB x10 AB       Toe Raises Standing x10 AB        Hip Flexion Standing x10 AB        Sit to Stand 4x        Long arc quads 2x10 AB        Heel cord stretch  x2 [place]       Ankle pumps x10 AB        Long arc quads  2x10 AB       Hip flexion in sit  2x10 AB       Hip ab/ad sit  2x10 AB       Key:  R=right, L=left, B=bilaterally, A=active, AA=active assisted, P=passive    AFTER TREATMENT POSITION/PRECAUTIONS:  Chair, Needs within reach and RN notified    INTERDISCIPLINARY COLLABORATION:  RN/PCT and PT/PTA    TOTAL TREATMENT DURATION:  PT Patient Time In/Time Out  Time In: 1020  Time Out: 629 Jovon Thornton PT, DPT

## 2021-03-02 NOTE — PROGRESS NOTES
Problem: Falls - Risk of  Goal: *Absence of Falls  Description: Document Aliya Edwards Fall Risk and appropriate interventions in the flowsheet. Outcome: Progressing Towards Goal  Note: Fall Risk Interventions:  Mobility Interventions: Bed/chair exit alarm, Patient to call before getting OOB         Medication Interventions: Bed/chair exit alarm, Patient to call before getting OOB, Teach patient to arise slowly    Elimination Interventions: Bed/chair exit alarm, Call light in reach, Patient to call for help with toileting needs, Toileting schedule/hourly rounds    History of Falls Interventions: Bed/chair exit alarm, Investigate reason for fall         Problem: Patient Education: Go to Patient Education Activity  Goal: Patient/Family Education  Outcome: Progressing Towards Goal     Problem: Pressure Injury - Risk of  Goal: *Prevention of pressure injury  Description: Document Stephan Scale and appropriate interventions in the flowsheet.   Outcome: Progressing Towards Goal  Note: Pressure Injury Interventions:  Sensory Interventions: Keep linens dry and wrinkle-free, Maintain/enhance activity level, Minimize linen layers    Moisture Interventions: Absorbent underpads, Check for incontinence Q2 hours and as needed    Activity Interventions: Increase time out of bed    Mobility Interventions: PT/OT evaluation    Nutrition Interventions: Document food/fluid/supplement intake                     Problem: Patient Education: Go to Patient Education Activity  Goal: Patient/Family Education  Outcome: Progressing Towards Goal     Problem: Nausea/Vomiting (Adult)  Goal: *Absence of nausea/vomiting  Outcome: Progressing Towards Goal  Goal: *Palliation of nausea/vomiting (Palliative Care)  Outcome: Progressing Towards Goal     Problem: Breathing Pattern - Ineffective  Goal: Ability to achieve and maintain a regular respiratory rate  Outcome: Progressing Towards Goal     Problem: Isolation Precautions - Risk of Spread of Infection  Goal: Prevent transmission of infectious organism to others  Outcome: Progressing Towards Goal     Problem: Nutrition Deficits  Goal: Optimize nutrtional status  Outcome: Progressing Towards Goal     Problem: Risk for Fluid Volume Deficit  Goal: Maintain normal heart rhythm  Outcome: Progressing Towards Goal  Goal: Maintain absence of muscle cramping  Outcome: Progressing Towards Goal  Goal: Maintain normal serum potassium, sodium, calcium, phosphorus, and pH  Outcome: Progressing Towards Goal     Problem: Loneliness or Risk for Loneliness  Goal: Demonstrate positive use of time alone when socialization is not possible  Outcome: Progressing Towards Goal     Problem: Fatigue  Goal: Verbalize increase energy and improved vitality  Outcome: Progressing Towards Goal     Problem: Patient Education: Go to Patient Education Activity  Goal: Patient/Family Education  Outcome: Progressing Towards Goal     Problem: Nutrition Deficit  Goal: *Optimize nutritional status  Outcome: Progressing Towards Goal

## 2021-03-02 NOTE — PROGRESS NOTES
Physician Progress Note      PATIENT:               Kanika Snider  CSN #:                  [id number]  :                       1960  ADMIT DATE:       2021 9:06 AM  DISCH DATE:  RESPONDING  PROVIDER #:        Tye BERGER DO          QUERY TEXT:    Pt admitted with COVID-19 and COVID PNE and noted to have 99.1 99 36 134/80 94% 4L . If possible, please document in progress notes and discharge summary if you are evaluating and/or treating: The medical record reflects the following:  Risk Factors: 2/15  + COVID  Clinical Indicators:  Lactate 1.7--1.5, no Leukocytosis,   ED Note: Pneumonia due to COVID-19 virus: RLL Rhonchi,new and requires workup, SOB x3 days, fever, cough, sputum production, vomiting, abd pain + activity chg, appetite chg, chills, fatigue, fever ,cough, prod sputum, SOB,  CT Chest: Diffuse bilateral infiltrates.    Acute hypoxic respiratory failure-the patient presented with sats of 88 to 84% on room air   BC negative x 5 days, 3/1 91-92% O2sat 91-92% 60L HFNC, Tmax 100.0 , HRmax 108 , RRmax 36. 88/57  Treatment: Convalescent Plasma, IV Remdesivir, Proventil inhaler, Vitamin C, D3, Zinc, Tessalon TID PRN, Robitussin -Codeine TID PRN, Imodium 2m Q 4hrs PRN,  IV Zithromax 500mg x1, IV Rocephin 2gm x 1, IV Decadron, Proning,    Thank you,  Olga Genao RN,C BSN  Clinical   [email or web address]  Options provided:  -- Sepsis present on admission due to COVID-19 infection  -- Sepsis not present on admission due to COVID-19 infection  -- Sepsis present on admission due to COVID-19 pneumonia  -- Sepsis not present on admission due to COVID-19 pneumonia  -- Covid-19 infection without sepsis  -- Covid-19 pneumonia without sepsis  -- Other - I will add my own diagnosis  -- Disagree - Not applicable / Not valid  -- Disagree - Clinically unable to determine / Unknown  -- Refer to Clinical Documentation Reviewer    PROVIDER RESPONSE TEXT:    This patient has sepsis which was not present on admission due to COVID-19 infection. Query created by:  Savannah Rose on 3/1/2021 6:59 PM      Electronically signed by:  Conrado Councilman DO EDWARDS DO 3/2/2021 7:34 AM

## 2021-03-03 PROCEDURE — 94762 N-INVAS EAR/PLS OXIMTRY CONT: CPT

## 2021-03-03 PROCEDURE — 74011250636 HC RX REV CODE- 250/636: Performed by: INTERNAL MEDICINE

## 2021-03-03 PROCEDURE — 94760 N-INVAS EAR/PLS OXIMETRY 1: CPT

## 2021-03-03 PROCEDURE — 74011250636 HC RX REV CODE- 250/636: Performed by: FAMILY MEDICINE

## 2021-03-03 PROCEDURE — 65270000029 HC RM PRIVATE

## 2021-03-03 PROCEDURE — 74011250637 HC RX REV CODE- 250/637: Performed by: INTERNAL MEDICINE

## 2021-03-03 PROCEDURE — 74011250637 HC RX REV CODE- 250/637: Performed by: FAMILY MEDICINE

## 2021-03-03 PROCEDURE — 77010033711 HC HIGH FLOW OXYGEN

## 2021-03-03 PROCEDURE — 77010033678 HC OXYGEN DAILY

## 2021-03-03 PROCEDURE — 97535 SELF CARE MNGMENT TRAINING: CPT

## 2021-03-03 PROCEDURE — 94640 AIRWAY INHALATION TREATMENT: CPT

## 2021-03-03 RX ORDER — ALBUTEROL SULFATE 90 UG/1
2 AEROSOL, METERED RESPIRATORY (INHALATION)
Status: DISCONTINUED | OUTPATIENT
Start: 2021-03-03 | End: 2021-03-12 | Stop reason: HOSPADM

## 2021-03-03 RX ADMIN — ALBUTEROL SULFATE 2 PUFF: 108 INHALANT RESPIRATORY (INHALATION) at 07:59

## 2021-03-03 RX ADMIN — ENOXAPARIN SODIUM 30 MG: 100 INJECTION SUBCUTANEOUS at 12:32

## 2021-03-03 RX ADMIN — NYSTATIN 500000 UNITS: 100000 SUSPENSION ORAL at 17:21

## 2021-03-03 RX ADMIN — NYSTATIN 500000 UNITS: 100000 SUSPENSION ORAL at 08:12

## 2021-03-03 RX ADMIN — ALBUTEROL SULFATE 2 PUFF: 108 INHALANT RESPIRATORY (INHALATION) at 13:59

## 2021-03-03 RX ADMIN — ALBUTEROL SULFATE 2 PUFF: 108 INHALANT RESPIRATORY (INHALATION) at 21:19

## 2021-03-03 RX ADMIN — FAMOTIDINE 20 MG: 20 TABLET ORAL at 17:21

## 2021-03-03 RX ADMIN — OXYCODONE HYDROCHLORIDE AND ACETAMINOPHEN 1000 MG: 500 TABLET ORAL at 08:12

## 2021-03-03 RX ADMIN — ENOXAPARIN SODIUM 30 MG: 100 INJECTION SUBCUTANEOUS at 00:39

## 2021-03-03 RX ADMIN — VITAMIN D, TAB 1000IU (100/BT) 2000 UNITS: 25 TAB at 08:12

## 2021-03-03 RX ADMIN — ZINC SULFATE 220 MG (50 MG) CAPSULE 1 CAPSULE: CAPSULE at 08:12

## 2021-03-03 RX ADMIN — FAMOTIDINE 20 MG: 20 TABLET ORAL at 08:12

## 2021-03-03 RX ADMIN — MONTELUKAST 10 MG: 10 TABLET, FILM COATED ORAL at 08:12

## 2021-03-03 RX ADMIN — NYSTATIN 500000 UNITS: 100000 SUSPENSION ORAL at 12:32

## 2021-03-03 RX ADMIN — DEXAMETHASONE SODIUM PHOSPHATE 6 MG: 10 INJECTION INTRAMUSCULAR; INTRAVENOUS at 08:13

## 2021-03-03 RX ADMIN — NYSTATIN 500000 UNITS: 100000 SUSPENSION ORAL at 21:49

## 2021-03-03 RX ADMIN — AMITRIPTYLINE HYDROCHLORIDE 10 MG: 10 TABLET, FILM COATED ORAL at 21:49

## 2021-03-03 NOTE — PROGRESS NOTES
Chart screened by CM for d/c planning. Pt currently requiring 60L O2 heated HFNC Airvo 70%. Wean O2 as tolerated. PT currently recommends HH upon d/c while OT does not recommend any further skilled therapy. LOS = 9 days. No immediate needs identified. CM will continue to follow and remain available if any needs arise.

## 2021-03-03 NOTE — PROGRESS NOTES
ACUTE OT GOALS:  (Developed with and agreed upon by patient and/or caregiver.)  1) Patient will complete lower body bathing and dressing with stand by assistance and adaptive equipment as needed. 2) Patient will complete toileting with stand by assistance. 3) Patient will complete functional transfers with stand by assistance and adaptive equipment as needed. 4) Patient will tolerate at least 25 minutes of OT activity with 1-2 rest breaks while maintaining O2 sats >90%. 5) Patient will verbalize at least 3 energy conservation technique to utilize during ADL/IADL. Timeframe: 7 visits     OCCUPATIONAL THERAPY: Daily Note OT Treatment Day # 2    Claudeen Peers is a 64 y.o. male   PRIMARY DIAGNOSIS: Respiratory failure with hypoxia ([address]  COVID-19 [U07.1]  Respiratory failure with hypoxia ([facility] [address] [J96.91]       Payor: [employer] / Plan: [facility] [employer] / Product Type: SERENA /   ASSESSMENT:     REHAB RECOMMENDATIONS: CURRENT LEVEL OF FUNCTION:  (Most Recently Demonstrated)   Recommendation to date pending progress:  [address] [place] [address] Therapy  Equipment:    To Be Determined Bathing:   Contact Guard Assistance  Dressing:   Contact Guard Assistance  Feeding/Grooming:   Set Up  Toileting:   Supervision  Functional Mobility:   Contact Guard Assistance     ASSESSMENT:  Mr. Stephen Gray was able to maintain O2 sats >90% during seated ADLs, but he did desat to high-80s in standing. Balance seems a little worse today. Biggest limiting factor is decreased activity tolerance secondary to respiratory status. Will continue efforts. SUBJECTIVE:   Mr. Stephen Gray states, \"Thank you. \"    SOCIAL HISTORY/LIVING ENVIRONMENT:  Pt lives with family in a 2-story home but lives on 1st level. Pt has tub shower without shower chair and no AD at home. Pt works, drives and has family to assist if needed.    Home Environment: Private residence  One/Two Story Residence: Two story  Living Alone: No  Support Systems: Family member(s)    OBJECTIVE:     PAIN: VITAL SIGNS: LINES/DRAINS:   Pre Treatment:    Post Treatment: 0 Vital Signs  O2 Sat (%): 94 %  O2 Device: Hi flow nasal cannula  O2 Flow Rate (L/min): 60 l/min  FIO2 (%): 70 % IV  O2 Device: Heated, Hi flow nasal cannula     ACTIVITIES OF DAILY LIVING: I Mod I S SBA CGA Min Mod Max Total NT Comments   BASIC ADLs:              Bathing/ Showering [] [] [x] [] [x] [] [] [] [] [] Setup for seated;   CGA for standing    Toileting [] [] [x] [] [] [] [] [] [] []    Dressing [] [] [] [] [x] [] [] [] [] [] CGA for LB dressing, UB dressing    Feeding [] [] [] [] [] [] [] [] [] [x]    Grooming [] [] [x] [] [] [] [] [] [] [] Washing face, washing hair with shampoo cap, brushing teeth, combing hair all from seated position    Personal Device Care [x] [] [] [] [] [] [] [] [] []    Functional Mobility [] [] [] [] [x] [x] [] [] [] []    I=Independent, Mod I=Modified Independent, S=Supervision, SBA=Standby Assistance, CGA=Contact Guard Assistance,   Min=Minimal Assistance, Mod=Moderate Assistance, Max=Maximal Assistance, Total=Total Assistance, NT=Not Tested    MOBILITY: I Mod I S SBA CGA Min Mod Max Total  NT x2 Comments:   Supine to sit [] [] [] [x] [] [] [] [] [] [] []    Sit to supine [] [] [] [] [] [] [] [] [] [x] []    Sit to stand [] [] [] [] [x] [] [] [] [] [] []    Bed to chair [] [] [] [] [x] [x] [] [] [] [] []    I=Independent, Mod I=Modified Independent, S=Supervision, SBA=Standby Assistance, CGA=Contact Guard Assistance,   Min=Minimal Assistance, Mod=Moderate Assistance, Max=Maximal Assistance, Total=Total Assistance, NT=Not Tested    PLAN:   FREQUENCY/DURATION: OT Plan of Care: 3 times/week for duration of hospital stay or until stated goals are met, whichever comes first.    TREATMENT:   TREATMENT:   ($$ Self Care/Home Management: 23-37 mins    )  Self Care (32 Minutes): Self care including Upper Body Bathing, Lower Body Bathing, Toileting, Upper Body Dressing, Lower Body Dressing, Self Feeding, Grooming and bed to chair transfer to increase independence and activity tolerance.     AFTER TREATMENT POSITION/PRECAUTIONS:  Chair, Needs within reach and RN notified    INTERDISCIPLINARY COLLABORATION:  RN/PCT and OT/NIELSEN    TOTAL TREATMENT DURATION:  OT Patient Time In/Time Out  Time In: 0282  Time Out: Curt Mujica 429, OTR/L

## 2021-03-03 NOTE — PROGRESS NOTES
Problem: Falls - Risk of  Goal: *Absence of Falls  Description: Document Neymar Mckinnon Fall Risk and appropriate interventions in the flowsheet. Outcome: Progressing Towards Goal  Note: Fall Risk Interventions:  Mobility Interventions: Patient to call before getting OOB         Medication Interventions: Patient to call before getting OOB, Teach patient to arise slowly    Elimination Interventions: Call light in reach, Patient to call for help with toileting needs, Urinal in reach    History of Falls Interventions: Investigate reason for fall         Problem: Patient Education: Go to Patient Education Activity  Goal: Patient/Family Education  Outcome: Progressing Towards Goal     Problem: Pressure Injury - Risk of  Goal: *Prevention of pressure injury  Description: Document Stephan Scale and appropriate interventions in the flowsheet.   Outcome: Progressing Towards Goal  Note: Pressure Injury Interventions:  Sensory Interventions: Assess changes in LOC, Avoid rigorous massage over bony prominences, Check visual cues for pain, Keep linens dry and wrinkle-free, Minimize linen layers    Moisture Interventions: Absorbent underpads, Check for incontinence Q2 hours and as needed    Activity Interventions: Increase time out of bed, Pressure redistribution bed/mattress(bed type), PT/OT evaluation    Mobility Interventions: Pressure redistribution bed/mattress (bed type), PT/OT evaluation    Nutrition Interventions: Document food/fluid/supplement intake, Offer support with meals,snacks and hydration                     Problem: Patient Education: Go to Patient Education Activity  Goal: Patient/Family Education  Outcome: Progressing Towards Goal     Problem: Nausea/Vomiting (Adult)  Goal: *Absence of nausea/vomiting  Outcome: Progressing Towards Goal  Goal: *Palliation of nausea/vomiting (Palliative Care)  Outcome: Progressing Towards Goal     Problem: Breathing Pattern - Ineffective  Goal: Ability to achieve and maintain a regular respiratory rate  Outcome: Progressing Towards Goal     Problem: Isolation Precautions - Risk of Spread of Infection  Goal: Prevent transmission of infectious organism to others  Outcome: Progressing Towards Goal     Problem: Nutrition Deficits  Goal: Optimize nutrtional status  Outcome: Progressing Towards Goal     Problem: Risk for Fluid Volume Deficit  Goal: Maintain normal heart rhythm  Outcome: Progressing Towards Goal  Goal: Maintain absence of muscle cramping  Outcome: Progressing Towards Goal  Goal: Maintain normal serum potassium, sodium, calcium, phosphorus, and pH  Outcome: Progressing Towards Goal     Problem: Loneliness or Risk for Loneliness  Goal: Demonstrate positive use of time alone when socialization is not possible  Outcome: Progressing Towards Goal     Problem: Fatigue  Goal: Verbalize increase energy and improved vitality  Outcome: Progressing Towards Goal     Problem: Patient Education: Go to Patient Education Activity  Goal: Patient/Family Education  Outcome: Progressing Towards Goal     Problem: Nutrition Deficit  Goal: *Optimize nutritional status  Outcome: Progressing Towards Goal

## 2021-03-03 NOTE — PROGRESS NOTES
Progress Note    Patient: Mariama Schultz MRN: [id number]  SSN: [id number]    YOB: 1960  Age: 64 y.o. Sex: male      Admit Date: 2/21/2021    LOS: 10 days     Subjective: Follow-up COVID    \"58 y.o. male with medical h/o anxiety and GERD who was seen in the ER on February 18, 2021 and diagnosed with COVID-19 pneumonia. He was discharged to home but per him he continued to get progressively worse and short of breath. Per the patient he denies entire family are sick with the COVID-19. They did have a gathering for the Super Bowl and this is where he thinks he may have gotten infected. \" S/p convalescent plasma on 2/22. Last dose of remdesivir 2/25. S/p Ceftriaxone and doxycycline one dose.       Currently on 60L 66%. No SOB or chest pain.    Current Facility-Administered Medications   Medication Dose Route Frequency    albuterol (PROVENTIL HFA, VENTOLIN HFA, PROAIR HFA) inhaler 2 Puff  2 Puff Inhalation TID RT    dexamethasone (DECADRON) 10 mg/mL injection 6 mg  6 mg IntraVENous DAILY    phenol throat spray (CHLORASEPTIC) 1 Spray  1 Spray Oral PRN    nystatin (MYCOSTATIN) 100,000 unit/mL oral suspension 500,000 Units  500,000 Units Oral QID    loperamide (IMODIUM) capsule 2 mg  2 mg Oral Q4H PRN    LORazepam (ATIVAN) tablet 1 mg  1 mg Oral Q6H PRN    0.9% sodium chloride infusion 250 mL  250 mL IntraVENous PRN    amitriptyline (ELAVIL) tablet 10 mg  10 mg Oral QHS    benzonatate (TESSALON) capsule 100 mg  100 mg Oral TID PRN    famotidine (PEPCID) tablet 20 mg  20 mg Oral BID    guaiFENesin-codeine (ROBITUSSIN AC) 100-10 mg/5 mL solution 5 mL  5 mL Oral TID PRN    montelukast (SINGULAIR) tablet 10 mg  10 mg Oral DAILY    tiZANidine (ZANAFLEX) tablet 4 mg  4 mg Oral TID PRN    zolpidem (AMBIEN) tablet 5 mg  5 mg Oral QHS PRN    hyoscyamine SL (LEVSIN/SL) tablet 0.25 mg  0.25 mg SubLINGual Q4H PRN    enoxaparin (LOVENOX) injection 30 mg  30 mg SubCUTAneous Q12H    acetaminophen (TYLENOL) tablet 650 mg  650 mg Oral Q6H PRN    Or    acetaminophen (TYLENOL) suppository 650 mg  650 mg Rectal Q6H PRN    cholecalciferol (VITAMIN D3) (1000 Units /25 mcg) tablet 2,000 Units  2,000 Units Oral DAILY    ascorbic acid (vitamin C) (VITAMIN C) tablet 1,000 mg  1,000 mg Oral BID    guaiFENesin (ROBITUSSIN) 100 mg/5 mL oral liquid 200 mg  200 mg Oral Q4H PRN    ondansetron (ZOFRAN) injection 4 mg  4 mg IntraVENous Q6H PRN    zinc sulfate (ZINCATE) 220 (50) mg capsule 1 Cap  1 Cap Oral DAILY       Objective:     Vitals:    03/03/21 0754 03/03/21 0759 03/03/21 1204 03/03/21 1359   BP: 105/75  (!) 87/67    Pulse: 84  92    Resp: 18  17    Temp: 97.8 °F (36.6 °C)  97.5 °F (36.4 °C)    SpO2: 96% 94% 95% 95%   Weight:       Height:             Intake and Output:  Current Shift: 03/03 0701 - 03/03 1900  In: -   Out: 475 [Urine:475]  Last three shifts: 03/01 1901 - 03/03 0700  In: 360 [P.O.:360]  Out: 350 [Urine:350]    Physical Exam:   General:  Alert, cooperative, sitting up in chair   Eyes:  Conjunctivae/corneas clear. Ears:  Normal TMs and external ear canals both ears. Nose: Nares normal. Septum midline. Mouth/Throat: No abnormality    Neck:  no JVD. Back:   deferred   Lungs:   Clear to auscultation bilaterally. No respiratory distress. Heart:  Regular rate and rhythm, S1, S2 normal   Abdomen:   Soft, non-tender. Bowel sounds normal.    Extremities: Extremities normal, atraumatic, no cyanosis or edema. Pulses: 2+ and symmetric all extremities. Skin: Skin color, texture, turgor normal. No rashes or lesions   Lymph nodes: Cervical, supraclavicular, and axillary nodes normal.   Neurologic: CNII-XII intact. Lab/Data Review:    No results found for this or any previous visit (from the past 24 hour(s)). Assessment/ Plan:     Principal Problem:    Respiratory failure with hypoxia ([address] (2/21/2021)    Active Problems:    COVID-19 (2/21/2021)    COVID - s/p CP.  S/p remdesivir. Albuterol q6hr. Vitamin C, Vitamin D, Zinc I will dc. Decadron that I will dc since has been 10 days. Added nystatin. Left renal  Mass - Chronic and he states he has nephrologist who follows. Nausea - PRN Zofran. Diarrhea - PRN immodium     Slow progression. Likely lengthy stay    I have updated the daughter, Ash 3/2. Try to update daughter again on 3/4 if possible by new provider.      DVT prophylaxis - Lovenox   Signed By: Christi Ch,      March 3, 2021

## 2021-03-03 NOTE — PROGRESS NOTES
Problem: Falls - Risk of  Goal: *Absence of Falls  Description: Document Lena Barr Fall Risk and appropriate interventions in the flowsheet. Outcome: Progressing Towards Goal  Note: Fall Risk Interventions:  Mobility Interventions: Patient to call before getting OOB         Medication Interventions: Patient to call before getting OOB, Teach patient to arise slowly    Elimination Interventions: Call light in reach, Patient to call for help with toileting needs, Urinal in reach    History of Falls Interventions: Investigate reason for fall         Problem: Patient Education: Go to Patient Education Activity  Goal: Patient/Family Education  Outcome: Progressing Towards Goal     Problem: Pressure Injury - Risk of  Goal: *Prevention of pressure injury  Description: Document Stephan Scale and appropriate interventions in the flowsheet.   Outcome: Progressing Towards Goal  Note: Pressure Injury Interventions:  Sensory Interventions: Assess changes in LOC, Avoid rigorous massage over bony prominences, Check visual cues for pain, Keep linens dry and wrinkle-free, Minimize linen layers    Moisture Interventions: Absorbent underpads, Check for incontinence Q2 hours and as needed    Activity Interventions: Increase time out of bed, Pressure redistribution bed/mattress(bed type), PT/OT evaluation    Mobility Interventions: Pressure redistribution bed/mattress (bed type), PT/OT evaluation    Nutrition Interventions: Document food/fluid/supplement intake, Offer support with meals,snacks and hydration                     Problem: Patient Education: Go to Patient Education Activity  Goal: Patient/Family Education  Outcome: Progressing Towards Goal     Problem: Nausea/Vomiting (Adult)  Goal: *Absence of nausea/vomiting  Outcome: Progressing Towards Goal  Goal: *Palliation of nausea/vomiting (Palliative Care)  Outcome: Progressing Towards Goal     Problem: Breathing Pattern - Ineffective  Goal: Ability to achieve and maintain a regular respiratory rate  Outcome: Progressing Towards Goal     Problem: Isolation Precautions - Risk of Spread of Infection  Goal: Prevent transmission of infectious organism to others  Outcome: Progressing Towards Goal     Problem: Nutrition Deficits  Goal: Optimize nutrtional status  Outcome: Progressing Towards Goal     Problem: Risk for Fluid Volume Deficit  Goal: Maintain normal heart rhythm  Outcome: Progressing Towards Goal  Goal: Maintain absence of muscle cramping  Outcome: Progressing Towards Goal  Goal: Maintain normal serum potassium, sodium, calcium, phosphorus, and pH  Outcome: Progressing Towards Goal     Problem: Loneliness or Risk for Loneliness  Goal: Demonstrate positive use of time alone when socialization is not possible  Outcome: Progressing Towards Goal     Problem: Fatigue  Goal: Verbalize increase energy and improved vitality  Outcome: Progressing Towards Goal     Problem: Patient Education: Go to Patient Education Activity  Goal: Patient/Family Education  Outcome: Progressing Towards Goal     Problem: Nutrition Deficit  Goal: *Optimize nutritional status  Outcome: Progressing Towards Goal

## 2021-03-04 LAB
ANION GAP SERPL CALC-SCNC: 6 MMOL/L (ref 7–16)
BASOPHILS # BLD: 0.1 K/UL (ref 0–0.2)
BASOPHILS NFR BLD: 1 % (ref 0–2)
BUN SERPL-MCNC: 18 MG/DL (ref 6–23)
CALCIUM SERPL-MCNC: 8.1 MG/DL (ref 8.3–10.4)
CHLORIDE SERPL-SCNC: 106 MMOL/L (ref 98–107)
CO2 SERPL-SCNC: 28 MMOL/L (ref 21–32)
CREAT SERPL-MCNC: 0.77 MG/DL (ref 0.8–1.5)
DIFFERENTIAL METHOD BLD: ABNORMAL
EOSINOPHIL # BLD: 0 K/UL (ref 0–0.8)
EOSINOPHIL NFR BLD: 0 % (ref 0.5–7.8)
ERYTHROCYTE [DISTWIDTH] IN BLOOD BY AUTOMATED COUNT: 11.9 % (ref 11.9–14.6)
GLUCOSE SERPL-MCNC: 88 MG/DL (ref 65–100)
HCT VFR BLD AUTO: 39.4 % (ref 41.1–50.3)
HGB BLD-MCNC: 13.4 G/DL (ref 13.6–17.2)
IMM GRANULOCYTES # BLD AUTO: 1.4 K/UL (ref 0–0.5)
IMM GRANULOCYTES NFR BLD AUTO: 7 % (ref 0–5)
LYMPHOCYTES # BLD: 1.6 K/UL (ref 0.5–4.6)
LYMPHOCYTES NFR BLD: 8 % (ref 13–44)
MCH RBC QN AUTO: 30 PG (ref 26.1–32.9)
MCHC RBC AUTO-ENTMCNC: 34 G/DL (ref 31.4–35)
MCV RBC AUTO: 88.3 FL (ref 79.6–97.8)
MONOCYTES # BLD: 1.2 K/UL (ref 0.1–1.3)
MONOCYTES NFR BLD: 6 % (ref 4–12)
NEUTS SEG # BLD: 16.4 K/UL (ref 1.7–8.2)
NEUTS SEG NFR BLD: 79 % (ref 43–78)
NRBC # BLD: 0 K/UL (ref 0–0.2)
PLATELET # BLD AUTO: 397 K/UL (ref 150–450)
PMV BLD AUTO: 9.6 FL (ref 9.4–12.3)
POTASSIUM SERPL-SCNC: 3.9 MMOL/L (ref 3.5–5.1)
RBC # BLD AUTO: 4.46 M/UL (ref 4.23–5.6)
SODIUM SERPL-SCNC: 140 MMOL/L (ref 136–145)
WBC # BLD AUTO: 20.7 K/UL (ref 4.3–11.1)

## 2021-03-04 PROCEDURE — 74011250637 HC RX REV CODE- 250/637: Performed by: FAMILY MEDICINE

## 2021-03-04 PROCEDURE — 85025 COMPLETE CBC W/AUTO DIFF WBC: CPT

## 2021-03-04 PROCEDURE — 97530 THERAPEUTIC ACTIVITIES: CPT

## 2021-03-04 PROCEDURE — 77010033711 HC HIGH FLOW OXYGEN

## 2021-03-04 PROCEDURE — 74011250637 HC RX REV CODE- 250/637: Performed by: INTERNAL MEDICINE

## 2021-03-04 PROCEDURE — 80048 BASIC METABOLIC PNL TOTAL CA: CPT

## 2021-03-04 PROCEDURE — 94762 N-INVAS EAR/PLS OXIMTRY CONT: CPT

## 2021-03-04 PROCEDURE — 65270000029 HC RM PRIVATE

## 2021-03-04 PROCEDURE — 74011250636 HC RX REV CODE- 250/636: Performed by: INTERNAL MEDICINE

## 2021-03-04 PROCEDURE — 94640 AIRWAY INHALATION TREATMENT: CPT

## 2021-03-04 RX ADMIN — LORAZEPAM 1 MG: 1 TABLET ORAL at 21:50

## 2021-03-04 RX ADMIN — NYSTATIN 500000 UNITS: 100000 SUSPENSION ORAL at 17:06

## 2021-03-04 RX ADMIN — NYSTATIN 500000 UNITS: 100000 SUSPENSION ORAL at 08:07

## 2021-03-04 RX ADMIN — ENOXAPARIN SODIUM 30 MG: 100 INJECTION SUBCUTANEOUS at 11:16

## 2021-03-04 RX ADMIN — AMITRIPTYLINE HYDROCHLORIDE 10 MG: 10 TABLET, FILM COATED ORAL at 21:50

## 2021-03-04 RX ADMIN — NYSTATIN 500000 UNITS: 100000 SUSPENSION ORAL at 13:00

## 2021-03-04 RX ADMIN — ENOXAPARIN SODIUM 30 MG: 100 INJECTION SUBCUTANEOUS at 00:59

## 2021-03-04 RX ADMIN — ALBUTEROL SULFATE 2 PUFF: 108 INHALANT RESPIRATORY (INHALATION) at 21:41

## 2021-03-04 RX ADMIN — ALBUTEROL SULFATE 2 PUFF: 108 INHALANT RESPIRATORY (INHALATION) at 07:51

## 2021-03-04 RX ADMIN — NYSTATIN 500000 UNITS: 100000 SUSPENSION ORAL at 21:50

## 2021-03-04 RX ADMIN — FAMOTIDINE 20 MG: 20 TABLET ORAL at 17:03

## 2021-03-04 RX ADMIN — ALBUTEROL SULFATE 2 PUFF: 108 INHALANT RESPIRATORY (INHALATION) at 14:12

## 2021-03-04 RX ADMIN — FAMOTIDINE 20 MG: 20 TABLET ORAL at 08:07

## 2021-03-04 RX ADMIN — MONTELUKAST 10 MG: 10 TABLET, FILM COATED ORAL at 08:07

## 2021-03-04 RX ADMIN — GUAIFENESIN 200 MG: 200 SOLUTION ORAL at 11:50

## 2021-03-04 NOTE — PROGRESS NOTES
Problem: Falls - Risk of  Goal: *Absence of Falls  Description: Document Isabelle Lane Fall Risk and appropriate interventions in the flowsheet. Outcome: Progressing Towards Goal  Note: Fall Risk Interventions:  Mobility Interventions: Patient to call before getting OOB         Medication Interventions: Patient to call before getting OOB, Teach patient to arise slowly    Elimination Interventions: Call light in reach, Patient to call for help with toileting needs, Urinal in reach    History of Falls Interventions: Investigate reason for fall         Problem: Patient Education: Go to Patient Education Activity  Goal: Patient/Family Education  Outcome: Progressing Towards Goal     Problem: Pressure Injury - Risk of  Goal: *Prevention of pressure injury  Description: Document Stephan Scale and appropriate interventions in the flowsheet.   Outcome: Progressing Towards Goal  Note: Pressure Injury Interventions:  Sensory Interventions: Assess changes in LOC, Avoid rigorous massage over bony prominences, Check visual cues for pain, Keep linens dry and wrinkle-free, Minimize linen layers    Moisture Interventions: Absorbent underpads, Check for incontinence Q2 hours and as needed    Activity Interventions: Increase time out of bed, Pressure redistribution bed/mattress(bed type), PT/OT evaluation    Mobility Interventions: Pressure redistribution bed/mattress (bed type), PT/OT evaluation    Nutrition Interventions: Document food/fluid/supplement intake, Offer support with meals,snacks and hydration                     Problem: Patient Education: Go to Patient Education Activity  Goal: Patient/Family Education  Outcome: Progressing Towards Goal     Problem: Nausea/Vomiting (Adult)  Goal: *Absence of nausea/vomiting  Outcome: Progressing Towards Goal  Goal: *Palliation of nausea/vomiting (Palliative Care)  Outcome: Progressing Towards Goal     Problem: Breathing Pattern - Ineffective  Goal: Ability to achieve and maintain a regular respiratory rate  Outcome: Progressing Towards Goal     Problem: Isolation Precautions - Risk of Spread of Infection  Goal: Prevent transmission of infectious organism to others  Outcome: Progressing Towards Goal     Problem: Nutrition Deficits  Goal: Optimize nutrtional status  Outcome: Progressing Towards Goal     Problem: Risk for Fluid Volume Deficit  Goal: Maintain normal heart rhythm  Outcome: Progressing Towards Goal  Goal: Maintain absence of muscle cramping  Outcome: Progressing Towards Goal  Goal: Maintain normal serum potassium, sodium, calcium, phosphorus, and pH  Outcome: Progressing Towards Goal     Problem: Loneliness or Risk for Loneliness  Goal: Demonstrate positive use of time alone when socialization is not possible  Outcome: Progressing Towards Goal     Problem: Fatigue  Goal: Verbalize increase energy and improved vitality  Outcome: Progressing Towards Goal     Problem: Patient Education: Go to Patient Education Activity  Goal: Patient/Family Education  Outcome: Progressing Towards Goal     Problem: Nutrition Deficit  Goal: *Optimize nutritional status  Outcome: Progressing Towards Goal

## 2021-03-04 NOTE — ADT AUTH CERT NOTES
Pneumonia - Care Day 7 (2/27/2021) by Elfego Beavers       Review Status Review Entered   Completed 3/1/2021 16:21      Criteria Review      Care Day: 7 Care Date: 2/27/2021 Level of Care: Inpatient Floor    Guideline Day 2    Clinical Status    ( ) * No CO2 retention or acidosis    (X) * No requirement for mechanical ventilation    3/1/2021 08:33:12 EST by Elfego Beavers      yes    (X) * Hypotension absent    (X) * Afebrile or fever improved    3/1/2021 08:33:12 EST by Arabella Correia [id number]    ( ) * No hypoxia on room air or oxygenation improved    3/1/2021 16:21:06 EST by Elfego Beavers      Currently on 60L 75%    (X) * Mental status improved or at baseline    3/1/2021 08:33:12 EST by Elfego Beavers      yes    Activity    ( ) * Increased activity    Routes    (X) Oral hydration, medications    Interventions    (X) Pulse oximetry    (X) Possible oxygen    3/1/2021 08:33:12 EST by Elfego Beavers      On heated hi-flow oxygen    * Milestone   Additional Notes   Concurrent Review: 2/27/21       60 y.o. male with medical h/o anxiety and GERD who was seen in the ER on February 18, 2021 and diagnosed with COVID-19 pneumonia. [facility] was discharged to home but per him he continued to get progressively worse and short of breath.  Per the patient he denies entire family are sick with the COVID-19. Domenico Wiggins did have a gathering for the Super Bowl and this is where he thinks he may have gotten infected. \" S/p convalescent plasma on 2/22. Last dose of remdesivir 2/25. S/p Ceftriaxone and doxycycline one dose.         Currently on 60L 80%. Eating a little more. Less diarrhea. Starting to feel a little better.  Using IS and starting to prone more      Assessment/ Plan:    Respiratory failure with hypoxia        COVID - s/p CP. S/p remdesivir. Albuterol q6hr. Vitamin C, Vitamin D, Zinc. Decadron BID for steroid burst since 2/25. PPI.  Stated to prone and use IS.        Left renal  Mass - Chronic and he states he has nephrologist who follows.        Nausea - PRN Zofran.        Diarrhea - PRN immodium       VS: 98.1, 94/64, 74, 18, 92% on heated; hi-flow @ 60L      MEDS:         albuterol (PROVENTIL HFA, VENTOLIN HFA, PROAIR HFA) inhaler      2 Puff, IN, Q6H RT           amitriptyline (ELAVIL) tablet      10 mg, PO, QHS           ascorbic acid (vitamin C) (VITAMIN C) tablet 1      1,000 mg, PO, BID           cholecalciferol (VITAMIN D3) (1000 Units /25 mcg) tablet      2,000 Units, PO, DAILY         dexamethasone (DECADRON) 10 mg/mL injection      6 mg, IV, Q12H           enoxaparin (LOVENOX) injection     30 mg, SC, Q12H           famotidine (PEPCID) tablet      20 mg, PO, BID           montelukast (SINGULAIR) tablet      10 mg, PO, DAILY           zinc sulfate (ZINCATE) 220 (50) mg capsule      1 Cap, PO, DAILY         LABS:    HGB: 13.7   HCT: 40.3 (L)   Sodium: 141   Potassium: 4.2   Glucose: 143 (H)   BUN: 22   Creatinine: 0.72 (L)   Calcium: 8.5

## 2021-03-04 NOTE — PROGRESS NOTES
Progress Note    Patient: Dominick Holcomb MRN: [id number]  SSN: [id number]    YOB: 1960  Age: 64 y.o. Sex: male      Admit Date: 2/21/2021    LOS: 11 days     Subjective: Follow-up COVID    \"58 y.o. male with medical h/o anxiety and GERD who was seen in the ER on February 18, 2021 and diagnosed with COVID-19 pneumonia. He was discharged to home but per him he continued to get progressively worse and short of breath. Per the patient he denies entire family are sick with the COVID-19. They did have a gathering for the Super Bowl and this is where he thinks he may have gotten infected. \" S/p convalescent plasma on 2/22. Last dose of remdesivir 2/25. S/p Ceftriaxone and doxycycline one dose.       Currently on 55L 59%. No SOB or chest pain. States he is feeling weak today.  Elevated WBC --likely steroid induced  Current Facility-Administered Medications   Medication Dose Route Frequency    albuterol (PROVENTIL HFA, VENTOLIN HFA, PROAIR HFA) inhaler 2 Puff  2 Puff Inhalation TID RT    phenol throat spray (CHLORASEPTIC) 1 Spray  1 Spray Oral PRN    nystatin (MYCOSTATIN) 100,000 unit/mL oral suspension 500,000 Units  500,000 Units Oral QID    loperamide (IMODIUM) capsule 2 mg  2 mg Oral Q4H PRN    LORazepam (ATIVAN) tablet 1 mg  1 mg Oral Q6H PRN    0.9% sodium chloride infusion 250 mL  250 mL IntraVENous PRN    amitriptyline (ELAVIL) tablet 10 mg  10 mg Oral QHS    benzonatate (TESSALON) capsule 100 mg  100 mg Oral TID PRN    famotidine (PEPCID) tablet 20 mg  20 mg Oral BID    guaiFENesin-codeine (ROBITUSSIN AC) 100-10 mg/5 mL solution 5 mL  5 mL Oral TID PRN    montelukast (SINGULAIR) tablet 10 mg  10 mg Oral DAILY    tiZANidine (ZANAFLEX) tablet 4 mg  4 mg Oral TID PRN    zolpidem (AMBIEN) tablet 5 mg  5 mg Oral QHS PRN    hyoscyamine SL (LEVSIN/SL) tablet 0.25 mg  0.25 mg SubLINGual Q4H PRN    enoxaparin (LOVENOX) injection 30 mg  30 mg SubCUTAneous Q12H    acetaminophen (TYLENOL) tablet 650 mg  650 mg Oral Q6H PRN    Or    acetaminophen (TYLENOL) suppository 650 mg  650 mg Rectal Q6H PRN    guaiFENesin (ROBITUSSIN) 100 mg/5 mL oral liquid 200 mg  200 mg Oral Q4H PRN    ondansetron (ZOFRAN) injection 4 mg  4 mg IntraVENous Q6H PRN       Objective:     Vitals:    03/03/21 2337 03/04/21 0410 03/04/21 0716 03/04/21 0752   BP: 106/79 95/75 (!) 90/52    Pulse: 80 78 80    Resp: 18 18 18    Temp: 98 °F (36.7 °C) 98.1 °F (36.7 °C) 98.4 °F (36.9 °C)    SpO2: 94% 96% 93% 92%   Weight:       Height:             Intake and Output:  Current Shift: No intake/output data recorded. Last three shifts: 03/02 1901 - 03/04 0700  In: -   Out: 1125 [Urine:1125]    Physical Exam:   General:  Alert, cooperative   Eyes:  Conjunctivae/corneas clear. Ears:  Normal TMs and external ear canals both ears. Nose: Nares normal. Septum midline. Mouth/Throat: No abnormality    Neck:  no JVD. Back:   deferred   Lungs:   Clear to auscultation bilaterally. No respiratory distress. Heart:  Regular rate and rhythm, S1, S2 normal   Abdomen:   Soft, non-tender. Bowel sounds normal.    Extremities: Extremities normal, atraumatic, no cyanosis or edema. Pulses: 2+ and symmetric all extremities. Skin: Skin color, texture, turgor normal. No rashes or lesions   Lymph nodes: Cervical, supraclavicular, and axillary nodes normal.   Neurologic: CNII-XII intact.         Lab/Data Review:    Recent Results (from the past 24 hour(s))   CBC WITH AUTOMATED DIFF    Collection Time: 03/04/21  4:37 AM   Result Value Ref Range    WBC 20.7 (H) 4.3 - 11.1 K/uL    RBC 4.46 4.23 - 5.6 M/uL    HGB 13.4 (L) 13.6 - 17.2 g/dL    HCT 39.4 (L) 41.1 - 50.3 %    MCV 88.3 79.6 - 97.8 FL    MCH 30.0 26.1 - 32.9 PG    MCHC 34.0 31.4 - 35.0 g/dL    RDW 11.9 11.9 - 14.6 %    PLATELET 604 770 - 860 K/uL    MPV 9.6 9.4 - 12.3 FL    ABSOLUTE NRBC 0.00 0.0 - 0.2 K/uL    DF AUTOMATED      NEUTROPHILS 79 (H) 43 - 78 %    LYMPHOCYTES 8 (L) 13 - 44 %    MONOCYTES 6 4.0 - 12.0 %    EOSINOPHILS 0 (L) 0.5 - 7.8 %    BASOPHILS 1 0.0 - 2.0 %    IMMATURE GRANULOCYTES 7 (H) 0.0 - 5.0 %    ABS. NEUTROPHILS 16.4 (H) 1.7 - 8.2 K/UL    ABS. LYMPHOCYTES 1.6 0.5 - 4.6 K/UL    ABS. MONOCYTES 1.2 0.1 - 1.3 K/UL    ABS. EOSINOPHILS 0.0 0.0 - 0.8 K/UL    ABS. BASOPHILS 0.1 0.0 - 0.2 K/UL    ABS. IMM. GRANS. 1.4 (H) 0.0 - 0.5 K/UL   METABOLIC PANEL, BASIC    Collection Time: 03/04/21  4:37 AM   Result Value Ref Range    Sodium 140 136 - 145 mmol/L    Potassium 3.9 3.5 - 5.1 mmol/L    Chloride 106 98 - 107 mmol/L    CO2 28 21 - 32 mmol/L    Anion gap 6 (L) 7 - 16 mmol/L    Glucose 88 65 - 100 mg/dL    BUN 18 6 - 23 MG/DL    Creatinine 0.77 (L) 0.8 - 1.5 MG/DL    GFR est AA >60 >60 ml/min/1.73m2    GFR est non-AA >60 >60 ml/min/1.73m2    Calcium 8.1 (L) 8.3 - 10.4 MG/DL       Assessment/ Plan:     Principal Problem:    Respiratory failure with hypoxia ([facility] [address] (2/21/2021)    Active Problems:    COVID-19 (2/21/2021)    COVID - s/p CP. S/p remdesivir. Albuterol q6hr. S/p decadron for 10 days. Added nystatin. Left renal  Mass - Chronic and he states he has nephrologist who follows. Nausea - PRN Zofran. Diarrhea - PRN immodium     Slow progression. Likely lengthy stay    I have updated the daughter, [facility] 3/4. Family would like updates every other day.  Family is very pleasant     DVT prophylaxis - Lovenox   Signed By: Ardyce Kussmaul,      March 4, 2021

## 2021-03-04 NOTE — PROGRESS NOTES
ACUTE PHYSICAL THERAPY GOALS:  (Developed with and agreed upon by patient and/or caregiver. )  LTG:  (1.)Mr. Rachel Rhodes will move from supine to sit and sit to supine , scoot up and down, and roll side to side in bed with INDEPENDENT within 7 treatment day(s). (2.)Mr. Rachel Rhodes will transfer from bed to chair and chair to bed with SUPERVISION using the least restrictive device within 7 treatment day(s). (3.)Mr. Rachel Rhodes will ambulate with SUPERVISION for 100+ feet with the least restrictive device within 7 treatment day(s). (4.)Mr. Rachel Rhodes will perform sit-stand x5  INDEPENDENT without using UEs under 18 seconds within 7 treatment day(s). PHYSICAL THERAPY: Daily Note Treatment Day # 4    Morgan Martinez is a 64 y.o. male   PRIMARY DIAGNOSIS: Respiratory failure with hypoxia ([facility] [address]  COVID-19 [U07.1]  Respiratory failure with hypoxia ([facility] [address] [J96.91]         ASSESSMENT:     REHAB RECOMMENDATIONS: CURRENT LEVEL OF FUNCTION:  (Most Recently Demonstrated)   Recommendation to date pending progress:  [address] [place] [address] Therapy  Equipment:    To Be Determined Bed Mobility:   Standby Assistance  Sit to Stand:   Contact Guard Assistance  Transfers:   Contact Guard Assistance  Gait/Mobility:   Minimal Assistance     ASSESSMENT:  Mr. Rachel Rhodes was supine in bed, reports severe stomach cramping today. Worked on standing ex's and stand pivot transfers, but treatment limited by cramping. He demonstrated progress with activity tolerance and balance today. SUBJECTIVE:   Mr. Rachel Rhodes states, \"This morning was rough. 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 Desires to return when cleared.    Home Environment: Private residence  One/Two Story Residence: Two story  Living Alone: No  Support Systems: Family member(s)  OBJECTIVE:     PAIN: VITAL SIGNS: LINES/DRAINS:   Pre Treatment: Pain Screen  Pain Scale 1: Numeric (0 - 10)  Pain Intensity 1: 0  Post Treatment: 0   continuous O2 sat  O2 Device: Heated, Hi flow nasal cannula     MOBILITY: I Mod I S SBA CGA Min Mod Max Total  NT x2 Comments:   Bed Mobility    Rolling [] [] [] [x] [] [] [] [] [] [] []    Supine to Sit [] [] [] [x] [] [] [] [] [] [] []    Scooting [] [] [] [x] [] [] [] [] [] [] []    Sit to Supine [] [] [] [] [] [] [] [] [] [x] []    Transfers    Sit to Stand [] [] [] [] [x] [] [] [] [] [] []    Bed to Chair [] [] [] [] [x] [x] [] [] [] [] []    Stand to Sit [] [] [] [] [x] [] [] [] [] [] []    I=Independent, Mod I=Modified Independent, S=Supervision, SBA=Standby Assistance, CGA=Contact Guard Assistance,   Min=Minimal Assistance, Mod=Moderate Assistance, Max=Maximal Assistance, Total=Total Assistance, NT=Not Tested    BALANCE: Good Fair+ Fair Fair- Poor NT Comments   Sitting Static [x] [] [] [] [] []    Sitting Dynamic [x] [] [] [] [] []              Standing Static [] [] [x] [] [] []    Standing Dynamic [] [] [x] [] [] []      GAIT: I Mod I S SBA CGA Min Mod Max Total  NT x2 Comments:   Level of Assistance [] [] [] [] [x] [x] [] [] [] [] []    Distance 3x2    DME holding furniture    Gait Quality Slow, cautious, decreased step length. Weightbearing  Status N/A     I=Independent, Mod I=Modified Independent, S=Supervision, SBA=Standby Assistance, CGA=Contact Guard Assistance,   Min=Minimal Assistance, Mod=Moderate Assistance, Max=Maximal Assistance, Total=Total Assistance, NT=Not Tested    PLAN:   FREQUENCY/DURATION: PT Plan of Care: 3 times/week for duration of hospital stay or until stated goals are met, whichever comes first.  TREATMENT:     TREATMENT:   ($$ Therapeutic Activity: 23-37 mins    )  Therapeutic Activity (23 Minutes): Therapeutic activity included Supine to Sit, Scooting, Transfer Training, Ambulation on level ground, Sitting balance , Standing balance and exercises to improve functional Mobility, Strength and Activity tolerance.     TREATMENT GRID:  N/A Date: 3/01/21 3/02/21 03/04/21      Ambulation  Device  assistance         Partial Squats         Hip Abduction/ Adduction Standing x10 AB        Heel Raises  Standing x10 AB x10 AB x10 AB      Toe Raises Standing x10 AB  x10 AB      Hip Flexion Standing x10 AB  x10 AB      Sit to Stand 4x        Long arc quads 2x10 AB        Heel cord stretch  x2 [place]       Ankle pumps x10 AB        Long arc quads  2x10 AB       Hip flexion in sit  2x10 AB       Hip ab/ad sit  2x10 AB       Key:  R=right, L=left, B=bilaterally, A=active, AA=active assisted, P=passive    AFTER TREATMENT POSITION/PRECAUTIONS:  Chair, Needs within reach and RN notified    INTERDISCIPLINARY COLLABORATION:  RN/PCT and PT/PTA    TOTAL TREATMENT DURATION:  PT Patient Time In/Time Out  Time In: 1135  Time Out: [address], PT, DPT

## 2021-03-04 NOTE — PROGRESS NOTES
Problem: Falls - Risk of  Goal: *Absence of Falls  Description: Document Ugovaldemar Ramirez Fall Risk and appropriate interventions in the flowsheet. Outcome: Progressing Towards Goal  Note: Fall Risk Interventions:  Mobility Interventions: Patient to call before getting OOB         Medication Interventions: Patient to call before getting OOB    Elimination Interventions: Call light in reach, Patient to call for help with toileting needs    History of Falls Interventions: Investigate reason for fall         Problem: Pressure Injury - Risk of  Goal: *Prevention of pressure injury  Description: Document Stephan Scale and appropriate interventions in the flowsheet.   Outcome: Progressing Towards Goal  Note: Pressure Injury Interventions:  Sensory Interventions: Assess changes in LOC    Moisture Interventions: Absorbent underpads    Activity Interventions: PT/OT evaluation    Mobility Interventions: Float heels, HOB 30 degrees or less, Pressure redistribution bed/mattress (bed type), PT/OT evaluation    Nutrition Interventions: Document food/fluid/supplement intake                     Problem: Breathing Pattern - Ineffective  Goal: Ability to achieve and maintain a regular respiratory rate  Outcome: Progressing Towards Goal

## 2021-03-05 PROBLEM — U07.1 ACUTE RESPIRATORY FAILURE DUE TO SEVERE ACUTE RESPIRATORY SYNDROME CORONAVIRUS 2 (SARS-COV-2) INFECTION (HCC): Status: ACTIVE | Noted: 2021-02-21

## 2021-03-05 PROBLEM — J96.00 ACUTE RESPIRATORY FAILURE DUE TO SEVERE ACUTE RESPIRATORY SYNDROME CORONAVIRUS 2 (SARS-COV-2) INFECTION (HCC): Status: ACTIVE | Noted: 2021-02-21

## 2021-03-05 LAB
MM INDURATION POC: 0 MM (ref 0–5)
PPD POC: NEGATIVE NEGATIVE
PROCALCITONIN SERPL-MCNC: <0.05 NG/ML

## 2021-03-05 PROCEDURE — 94640 AIRWAY INHALATION TREATMENT: CPT

## 2021-03-05 PROCEDURE — 36415 COLL VENOUS BLD VENIPUNCTURE: CPT

## 2021-03-05 PROCEDURE — 84145 PROCALCITONIN (PCT): CPT

## 2021-03-05 PROCEDURE — 94760 N-INVAS EAR/PLS OXIMETRY 1: CPT

## 2021-03-05 PROCEDURE — 77010033711 HC HIGH FLOW OXYGEN

## 2021-03-05 PROCEDURE — 65270000029 HC RM PRIVATE

## 2021-03-05 PROCEDURE — 74011250636 HC RX REV CODE- 250/636: Performed by: INTERNAL MEDICINE

## 2021-03-05 PROCEDURE — 74011250637 HC RX REV CODE- 250/637: Performed by: INTERNAL MEDICINE

## 2021-03-05 PROCEDURE — 74011250637 HC RX REV CODE- 250/637: Performed by: FAMILY MEDICINE

## 2021-03-05 PROCEDURE — 97530 THERAPEUTIC ACTIVITIES: CPT

## 2021-03-05 RX ADMIN — ALBUTEROL SULFATE 2 PUFF: 108 INHALANT RESPIRATORY (INHALATION) at 19:50

## 2021-03-05 RX ADMIN — MONTELUKAST 10 MG: 10 TABLET, FILM COATED ORAL at 08:08

## 2021-03-05 RX ADMIN — NYSTATIN 500000 UNITS: 100000 SUSPENSION ORAL at 08:08

## 2021-03-05 RX ADMIN — ENOXAPARIN SODIUM 30 MG: 100 INJECTION SUBCUTANEOUS at 00:07

## 2021-03-05 RX ADMIN — ALBUTEROL SULFATE 2 PUFF: 108 INHALANT RESPIRATORY (INHALATION) at 08:58

## 2021-03-05 RX ADMIN — ALBUTEROL SULFATE 2 PUFF: 108 INHALANT RESPIRATORY (INHALATION) at 16:13

## 2021-03-05 RX ADMIN — ENOXAPARIN SODIUM 30 MG: 100 INJECTION SUBCUTANEOUS at 12:16

## 2021-03-05 RX ADMIN — ZOLPIDEM TARTRATE 5 MG: 5 TABLET, COATED ORAL at 21:59

## 2021-03-05 RX ADMIN — FAMOTIDINE 20 MG: 20 TABLET ORAL at 08:08

## 2021-03-05 RX ADMIN — NYSTATIN 500000 UNITS: 100000 SUSPENSION ORAL at 12:16

## 2021-03-05 RX ADMIN — NYSTATIN 500000 UNITS: 100000 SUSPENSION ORAL at 17:08

## 2021-03-05 RX ADMIN — AMITRIPTYLINE HYDROCHLORIDE 10 MG: 10 TABLET, FILM COATED ORAL at 21:59

## 2021-03-05 RX ADMIN — NYSTATIN 500000 UNITS: 100000 SUSPENSION ORAL at 22:04

## 2021-03-05 RX ADMIN — FAMOTIDINE 20 MG: 20 TABLET ORAL at 17:08

## 2021-03-05 NOTE — PROGRESS NOTES
Progress Note    Patient: Diomedes Kingston MRN: [id number]  SSN: [id number]    YOB: 1960  Age: 60 y.o.  Sex: male      Admit Date: 2/21/2021    LOS: 12 days     Subjective:   Follow-up COVID    \"60 y.o. male with medical h/o anxiety and GERD who was seen in the ER on February 18, 2021 and diagnosed with COVID-19 pneumonia.  He was discharged to home but per him he continued to get progressively worse and short of breath.  Per the patient he denies entire family are sick with the COVID-19.  They did have a gathering for the Super Bowl and this is where he thinks he may have gotten infected.\" S/p convalescent plasma on 2/22. Last dose of remdesivir 2/25. S/p Ceftriaxone and doxycycline one dose.       3/5:  Patient seen at bedside, reports feeling better.  Continues to be at 50 L.  Denies chest pain, shortness of breath, palpitations.  No fever, nausea or vomiting reported.    Mild abdominal discomfort but passing gas and bowel movement.    ROS: 10 point ROS is negative except what mentioned above.    Current Facility-Administered Medications   Medication Dose Route Frequency   • albuterol (PROVENTIL HFA, VENTOLIN HFA, PROAIR HFA) inhaler 2 Puff  2 Puff Inhalation TID RT   • phenol throat spray (CHLORASEPTIC) 1 Spray  1 Spray Oral PRN   • nystatin (MYCOSTATIN) 100,000 unit/mL oral suspension 500,000 Units  500,000 Units Oral QID   • loperamide (IMODIUM) capsule 2 mg  2 mg Oral Q4H PRN   • LORazepam (ATIVAN) tablet 1 mg  1 mg Oral Q6H PRN   • 0.9% sodium chloride infusion 250 mL  250 mL IntraVENous PRN   • amitriptyline (ELAVIL) tablet 10 mg  10 mg Oral QHS   • benzonatate (TESSALON) capsule 100 mg  100 mg Oral TID PRN   • famotidine (PEPCID) tablet 20 mg  20 mg Oral BID   • guaiFENesin-codeine (ROBITUSSIN AC) 100-10 mg/5 mL solution 5 mL  5 mL Oral TID PRN   • montelukast (SINGULAIR) tablet 10 mg  10 mg Oral DAILY   • tiZANidine (ZANAFLEX) tablet 4 mg  4 mg Oral TID PRN   • zolpidem  (AMBIEN) tablet 5 mg  5 mg Oral QHS PRN    hyoscyamine SL (LEVSIN/SL) tablet 0.25 mg  0.25 mg SubLINGual Q4H PRN    enoxaparin (LOVENOX) injection 30 mg  30 mg SubCUTAneous Q12H    acetaminophen (TYLENOL) tablet 650 mg  650 mg Oral Q6H PRN    Or    acetaminophen (TYLENOL) suppository 650 mg  650 mg Rectal Q6H PRN    guaiFENesin (ROBITUSSIN) 100 mg/5 mL oral liquid 200 mg  200 mg Oral Q4H PRN    ondansetron (ZOFRAN) injection 4 mg  4 mg IntraVENous Q6H PRN       Objective:     Vitals:    03/04/21 2141 03/04/21 2327 03/05/21 0329 03/05/21 0530   BP:  91/71 (!) 89/72 95/74   Pulse:  95 92 85   Resp:  20 18    Temp:  98.2 °F (36.8 °C) 98.6 °F (37 °C)    SpO2: 95% 92% 95%    Weight:       Height:             Intake and Output:  Current Shift: No intake/output data recorded. Last three shifts: 03/03 1901 - 03/05 0700  In: -   Out: 1400 [Urine:1400]    Physical Exam:   General:   Alert, awake, cooperative, NAD, on 50 L air Vo   Eyes:  Conjunctivae/corneas clear. Ears:  Normal TMs and external ear canals both ears. Nose: Nares normal. Septum midline. Mouth/Throat: No abnormality    Neck:  no JVD. Back:   deferred   Lungs:   Clear to auscultation bilaterally. No respiratory distress. Heart:  Regular rate and rhythm, S1, S2 normal   Abdomen:   Soft, non-tender. Bowel sounds normal.    Extremities: Extremities normal, atraumatic, no cyanosis or edema. Pulses: 2+ and symmetric all extremities. Skin: Skin color, texture, turgor normal. No rashes or lesions   Lymph nodes: Cervical, supraclavicular, and axillary nodes normal.   Neurologic: CNII-XII intact. Lab/Data Review:    No results found for this or any previous visit (from the past 24 hour(s)).     Assessment/ Plan:     Principal Problem:    Acute respiratory failure due to severe acute respiratory syndrome coronavirus 2 (SARS-CoV-2) infection [facility] (2/21/2021)    Active Problems:    Dyslipidemia (9/2/2015)      Gastroesophageal reflux disease without esophagitis (7/18/2018)      COVID-19 (2/21/2021)    Acute hypoxic respiratory failure due to COVID-19 pneumonia-  3/5:  s/p CP. S/p remdesivir. Albuterol q6hr. S/p decadron for 10 days. Added nystatin. Patient continues to be on air Vo 50 L. Currently on continuous pulse ox. Ordered for PCT in view of elevated WBC. Likely steroid-induced. Left renal  Mass -   3/5: No change   chronic and he states he has nephrologist who follows. Nausea - PRN Zofran. Diarrhea - PRN immodium     Slow progression. Likely lengthy stay    May need to consider placing at [facility] in view of prolonged hospital course and high oxygen requirement being on air Vo.     DVT prophylaxis - Lovenox   Signed By: Kathy Kim MD     March 5, 2021

## 2021-03-05 NOTE — PROGRESS NOTES
Problem: Falls - Risk of  Goal: *Absence of Falls  Description: Document Sonny Dillon Fall Risk and appropriate interventions in the flowsheet. Outcome: Progressing Towards Goal  Note: Fall Risk Interventions:  Mobility Interventions: Patient to call before getting OOB         Medication Interventions: Patient to call before getting OOB    Elimination Interventions: Call light in reach, Patient to call for help with toileting needs    History of Falls Interventions: Investigate reason for fall         Problem: Pressure Injury - Risk of  Goal: *Prevention of pressure injury  Description: Document Stephan Scale and appropriate interventions in the flowsheet.   Outcome: Progressing Towards Goal  Note: Pressure Injury Interventions:  Sensory Interventions: Assess changes in LOC    Moisture Interventions: Absorbent underpads    Activity Interventions: PT/OT evaluation    Mobility Interventions: Float heels, HOB 30 degrees or less, Pressure redistribution bed/mattress (bed type), PT/OT evaluation    Nutrition Interventions: Document food/fluid/supplement intake                     Problem: Nausea/Vomiting (Adult)  Goal: *Absence of nausea/vomiting  Outcome: Progressing Towards Goal     Problem: Breathing Pattern - Ineffective  Goal: Ability to achieve and maintain a regular respiratory rate  Outcome: Progressing Towards Goal     Problem: Isolation Precautions - Risk of Spread of Infection  Goal: Prevent transmission of infectious organism to others  Outcome: Progressing Towards Goal     Problem: Nutrition Deficits  Goal: Optimize nutrtional status  Outcome: Progressing Towards Goal

## 2021-03-05 NOTE — PROGRESS NOTES
ACUTE PHYSICAL THERAPY GOALS:  (Developed with and agreed upon by patient and/or caregiver. )  LTG:  (1.)Mr. Anika Marie will move from supine to sit and sit to supine , scoot up and down, and roll side to side in bed with INDEPENDENT within 7 treatment day(s). (2.)Mr. Anika Marie will transfer from bed to chair and chair to bed with SUPERVISION using the least restrictive device within 7 treatment day(s). (3.)Mr. Anika Marie will ambulate with SUPERVISION for 100+ feet with the least restrictive device within 7 treatment day(s). (4.)Mr. Anika Marei will perform sit-stand x5  INDEPENDENT without using UEs under 18 seconds within 7 treatment day(s). PHYSICAL THERAPY: Daily Note Treatment Day # 5    Marcio Patel is a 64 y.o. male   PRIMARY DIAGNOSIS: Acute respiratory failure due to severe acute respiratory syndrome coronavirus 2 (SARS-CoV-2) infection ([facility])  COVID-19 [U07.1]  Respiratory failure with hypoxia ([facility] [address] [J96.91]         ASSESSMENT:     REHAB RECOMMENDATIONS: CURRENT LEVEL OF FUNCTION:  (Most Recently Demonstrated)   Recommendation to date pending progress:  [address] [place] [address] Therapy  Equipment:    To Be Determined Bed Mobility:   Standby Assistance  Sit to Stand:  "[employer] Assistance  Transfers:   Standby Assistance  Gait/Mobility:   Contact Guard Assistance     ASSESSMENT:  Mr. Anika Marie was supine in bed, reports feeling better today. Worked on standing ex's and ambulation. spO2 mostly in 90's with frequent cueing for breathing. He demonstrated progress with activity tolerance and gait today. SUBJECTIVE:   Mr. Anika Marie states, \"I'm learning not to push myself so much. 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 Desires to return when cleared.    Home Environment: Private residence  One/Two Story Residence: Two story  Living Alone: No  Support Systems: Family member(s)  OBJECTIVE:     PAIN: VITAL SIGNS: LINES/DRAINS:   Pre Treatment: Pain Screen  Pain Scale 1: Numeric (0 - 10)  Pain Intensity 1: 0  Post Treatment: 0   continuous O2 sat  O2 Device: Hi flow nasal cannula, Heated     MOBILITY: I Mod I S SBA CGA Min Mod Max Total  NT x2 Comments:   Bed Mobility    Rolling [] [] [] [x] [] [] [] [] [] [] []    Supine to Sit [] [] [] [x] [] [] [] [] [] [] []    Scooting [] [] [] [x] [] [] [] [] [] [] []    Sit to Supine [] [] [] [] [] [] [] [] [] [x] []    Transfers    Sit to Stand [] [] [] [x] [] [] [] [] [] [] []    Bed to Chair [] [] [] [x] [] [] [] [] [] [] []    Stand to Sit [] [] [] [x] [] [] [] [] [] [] []    I=Independent, Mod I=Modified Independent, S=Supervision, SBA=Standby Assistance, CGA=Contact Guard Assistance,   Min=Minimal Assistance, Mod=Moderate Assistance, Max=Maximal Assistance, Total=Total Assistance, NT=Not Tested    BALANCE: Good Fair+ Fair Fair- Poor NT Comments   Sitting Static [x] [] [] [] [] []    Sitting Dynamic [x] [] [] [] [] []              Standing Static [] [] [x] [] [] []    Standing Dynamic [] [] [x] [] [] []      GAIT: I Mod I S SBA CGA Min Mod Max Total  NT x2 Comments:   Level of Assistance [] [] [] [] [x] [] [] [] [] [] []    Distance 15x2, then 25'    DME pushing IV pole    Gait Quality Slow, cautious, decreased step length. Weightbearing  Status N/A     I=Independent, Mod I=Modified Independent, S=Supervision, SBA=Standby Assistance, CGA=Contact Guard Assistance,   Min=Minimal Assistance, Mod=Moderate Assistance, Max=Maximal Assistance, Total=Total Assistance, NT=Not Tested    PLAN:   FREQUENCY/DURATION: PT Plan of Care: 3 times/week for duration of hospital stay or until stated goals are met, whichever comes first.  TREATMENT:     TREATMENT:   ($$ Therapeutic Activity: 23-37 mins    )  Therapeutic Activity (23 Minutes):  Therapeutic activity included Supine to Sit, Sit to Supine, Scooting, Transfer Training, Ambulation on level ground, Sitting balance , Standing balance and exercises to improve functional Mobility, Strength and Activity tolerance.     TREATMENT GRID:  N/A    Date: 3/01/21 3/02/21 03/04/21 3/5/21     Ambulation  Device  assistance         Partial Squats    x20 AB     Hip Abduction/ Adduction Standing x10 AB        Heel Raises  Standing x10 AB x10 AB x10 AB x20 AB     Toe Raises Standing x10 AB  x10 AB x20 AB     Hip Flexion Standing x10 AB  x10 AB x20AB     Sit to Stand 4x        Long arc quads 2x10 AB        Heel cord stretch  x2 [place]       Ankle pumps x10 AB        Long arc quads  2x10 AB       Hip flexion in sit  2x10 AB       Hip ab/ad sit  2x10 AB       Key:  R=right, L=left, B=bilaterally, A=active, AA=active assisted, P=passive    AFTER TREATMENT POSITION/PRECAUTIONS:  Bed, Needs within reach and RN notified    INTERDISCIPLINARY COLLABORATION:  RN/PCT and PT/PTA    TOTAL TREATMENT DURATION:  PT Patient Time In/Time Out  Time In: 1410  Time Out: 1717 [facility], PT, DPT

## 2021-03-05 NOTE — PROGRESS NOTES
Pt is observed in bed. A/O x 4. No acute distress noted at this time. Pt received ativan 1 mg per request for anxiety. Bed in low and locked position. Call light within reach and pt is aware to call for assistance.

## 2021-03-05 NOTE — PROGRESS NOTES
Chart screened by CM for d/c planning. Pt currently requiring 50L O2 HFNC Airvo 50%. Wean O2 as tolerated. PT and OT recommend [facility] upon d/c.  CM contacted [address] with [facility] to review pt's chart to see if he might be a candidate. LOS = 11 days. CM will continue to follow and and remain available if any needs arise.

## 2021-03-06 PROCEDURE — 74011250637 HC RX REV CODE- 250/637: Performed by: FAMILY MEDICINE

## 2021-03-06 PROCEDURE — 77010033711 HC HIGH FLOW OXYGEN

## 2021-03-06 PROCEDURE — 94640 AIRWAY INHALATION TREATMENT: CPT

## 2021-03-06 PROCEDURE — 74011250636 HC RX REV CODE- 250/636: Performed by: INTERNAL MEDICINE

## 2021-03-06 PROCEDURE — 2709999900 HC NON-CHARGEABLE SUPPLY

## 2021-03-06 PROCEDURE — 74011250637 HC RX REV CODE- 250/637: Performed by: HOSPITALIST

## 2021-03-06 PROCEDURE — 65270000029 HC RM PRIVATE

## 2021-03-06 PROCEDURE — 74011000250 HC RX REV CODE- 250: Performed by: HOSPITALIST

## 2021-03-06 PROCEDURE — 94762 N-INVAS EAR/PLS OXIMTRY CONT: CPT

## 2021-03-06 PROCEDURE — 74011250637 HC RX REV CODE- 250/637: Performed by: INTERNAL MEDICINE

## 2021-03-06 RX ORDER — TEMAZEPAM 15 MG/1
15 CAPSULE ORAL
Status: DISCONTINUED | OUTPATIENT
Start: 2021-03-06 | End: 2021-03-12 | Stop reason: HOSPADM

## 2021-03-06 RX ORDER — FAMOTIDINE 20 MG/1
40 TABLET, FILM COATED ORAL 2 TIMES DAILY
Status: DISCONTINUED | OUTPATIENT
Start: 2021-03-07 | End: 2021-03-07

## 2021-03-06 RX ORDER — BISMUTH SUBSALICYLATE 262 MG/15ML
30 LIQUID ORAL
Status: DISCONTINUED | OUTPATIENT
Start: 2021-03-06 | End: 2021-03-12 | Stop reason: HOSPADM

## 2021-03-06 RX ADMIN — NYSTATIN 500000 UNITS: 100000 SUSPENSION ORAL at 18:20

## 2021-03-06 RX ADMIN — NYSTATIN 500000 UNITS: 100000 SUSPENSION ORAL at 21:35

## 2021-03-06 RX ADMIN — ENOXAPARIN SODIUM 30 MG: 100 INJECTION SUBCUTANEOUS at 00:32

## 2021-03-06 RX ADMIN — TIZANIDINE 4 MG: 2 TABLET ORAL at 23:57

## 2021-03-06 RX ADMIN — GUAIFENESIN 200 MG: 200 SOLUTION ORAL at 21:35

## 2021-03-06 RX ADMIN — FAMOTIDINE 20 MG: 20 TABLET ORAL at 18:20

## 2021-03-06 RX ADMIN — MONTELUKAST 10 MG: 10 TABLET, FILM COATED ORAL at 09:21

## 2021-03-06 RX ADMIN — ALBUTEROL SULFATE 2 PUFF: 108 INHALANT RESPIRATORY (INHALATION) at 08:50

## 2021-03-06 RX ADMIN — ALBUTEROL SULFATE 2 PUFF: 108 INHALANT RESPIRATORY (INHALATION) at 19:41

## 2021-03-06 RX ADMIN — ENOXAPARIN SODIUM 30 MG: 100 INJECTION SUBCUTANEOUS at 23:03

## 2021-03-06 RX ADMIN — ENOXAPARIN SODIUM 30 MG: 100 INJECTION SUBCUTANEOUS at 18:25

## 2021-03-06 RX ADMIN — NYSTATIN 500000 UNITS: 100000 SUSPENSION ORAL at 09:21

## 2021-03-06 RX ADMIN — LOPERAMIDE HYDROCHLORIDE 2 MG: 2 CAPSULE ORAL at 18:20

## 2021-03-06 RX ADMIN — TEMAZEPAM 15 MG: 15 CAPSULE ORAL at 21:43

## 2021-03-06 RX ADMIN — ALBUTEROL SULFATE 2 PUFF: 108 INHALANT RESPIRATORY (INHALATION) at 13:31

## 2021-03-06 RX ADMIN — LIDOCAINE HYDROCHLORIDE 40 ML: 20 SOLUTION ORAL; TOPICAL at 22:12

## 2021-03-06 RX ADMIN — FAMOTIDINE 20 MG: 20 TABLET ORAL at 09:21

## 2021-03-06 RX ADMIN — AMITRIPTYLINE HYDROCHLORIDE 10 MG: 10 TABLET, FILM COATED ORAL at 21:35

## 2021-03-06 NOTE — PROGRESS NOTES
Pt is in bed resting quietly. A/O x 4. Denies c/o pain and other need. On airvo 45L/50% FiO2. Bed in low and locked position. Call light within reach and pt is aware to call for assistance.

## 2021-03-06 NOTE — PROGRESS NOTES
HOB elevated, no acute distress, lungs fine crackles A/P, SOB on exertion and at rest, neurovascular checks WDL, pain level 0, no complaints of calf pain.

## 2021-03-06 NOTE — PROGRESS NOTES
Hospitalist Progress Note     Admit Date:  2021  9:06 AM   Name:  Jazzy Seth   Age:  64 y.o.  :  1960   MRN:  [id number]   PCP:  Jasmin Green MD  Treatment Team: Attending Provider: Romain Yost DO; Care Manager: Elana Busby LM; Utilization Review: Rubio Funes; Primary Nurse: Orquidea Clement RN    Summary copied from past progress note:  \"58 y.o. male with medical h/o anxiety and GERD who was seen in the ER on 2021 and diagnosed with COVID-19 pneumonia. Rebecca Santizo was discharged to home but per him he continued to get progressively worse and short of breath.  Per the patient he denies entire family are sick with the COVID-19. Eric Trevizo did have a gathering for the Super Bowl and this is where he thinks he may have gotten infected. \" S/p convalescent plasma on . Last dose of remdesivir . S/p Ceftriaxone and doxycycline one dose. Subjective: Interval since last visit-no new complaints still on high flow nasal cannula oxygen. No significant diarrhea. Admitted with acute hypoxemic respiratory failure due to COVID-19 pneumonia and finished convalescent plasma dosed  and course of remdesivir 5 days finished . A/P:    Principal Problem:    Acute respiratory failure due to severe acute respiratory syndrome coronavirus 2 (SARS-CoV-2) infection ([facility] [address] (2021)  -continue supportive care with oxygenation as needed.         Dyslipidemia (2015)       Gastroesophageal reflux disease without esophagitis (2018)       COVID-19 (2021)     Acute hypoxic respiratory failure due to COVID-19 pneumonia-  3/5:  s/p CP. S/p remdesivir. Albuterol q6hr. S/p decadron for 10 days. Added nystatin. Patient continues to be on air Vo 50 L. Currently on continuous pulse ox. Ordered for PCT in view of elevated WBC. Likely steroid-induced. -monitor CBC off steroids.       Left renal  Mass -   3/5: No change   chronic and he states he has nephrologist who follows. March 6-emphasized to patient follow-up important     Nausea - PRN Zofran.      Diarrhea - PRN immodium   March 6-seems to be improving or he is minimizing.               DVT prophylaxis - Lovenox     Objective:     Patient Vitals for the past 24 hrs:   Temp Pulse Resp BP SpO2   03/06/21 1130 98.6 °F (37 °C) 98 18 92/68 92 %   03/06/21 0850 -- -- -- -- 93 %   03/06/21 0800 98.6 °F (37 °C) 100 18 90/67 92 %   03/06/21 0407 98.2 °F (36.8 °C) 87 18 90/72 95 %   03/06/21 0335 -- -- -- -- 96 %   03/05/21 2342 -- -- -- -- 97 %   03/05/21 2314 98.6 °F (37 °C) 88 18 105/60 97 %   03/05/21 1950 -- -- -- -- 91 %   03/05/21 1929 99 °F (37.2 °C) 98 18 97/69 95 %   03/05/21 1620 97.8 °F (36.6 °C) 100 18 (!) 114/48 93 %   03/05/21 1613 -- -- -- -- 95 %     Oxygen Therapy  O2 Sat (%): 92 % (03/06/21 1130)  Pulse via Oximetry: 104 beats per minute (03/05/21 1950)  O2 Device: Hi flow nasal cannula; Heated (03/06/21 1331)  Skin Assessment: Clean, dry, & intact (03/04/21 2141)  O2 Flow Rate (L/min): 45 l/min (03/06/21 1331)  O2 Temperature: 87.8 °F (31 °C) (03/06/21 1331)  FIO2 (%): 47 % (03/06/21 1331)    Intake/Output Summary (Last 24 hours) at 3/6/2021 1551  Last data filed at 3/5/2021 2207  Gross per 24 hour   Intake --   Output 200 ml   Net -200 ml         REVIEW OF SYSTEMS: Comprehensive ROS performed and negative except as stated in HPI. Physical Examination:  General:    Well nourished. No gross distress. Head:  Normocephalic, atraumatic, nares patent  Eyes:  Extraocular movements intact, normal sclera  CV:   RRR. No  Murmurs, clicks, or gallops, distal pulses intact  Lungs:   Still high flow nasal cannula oxygen. No gross consolidation. Abdomen:   Soft, nondistended, nontender. Extremities: Warm and dry. No cyanosis or edema. Skin:     No rashes or jaundice.    Neuro:  No gross focal deficits, no tremor  Psych:  Mood and affect appropriate    Data Review:  I have reviewed all labs, meds, telemetry events, and studies from the last 24 hours. No results found for this or any previous visit (from the past 24 hour(s)).      All Micro Results     Procedure Component Value Units Date/Time    CULTURE, BLOOD [[id number]] Collected: 02/21/21 0921    Order Status: Completed Specimen: Blood Updated: 02/26/21 0803     Special Requests: --        LEFT  Antecubital       Culture result: NO GROWTH 5 DAYS       CULTURE, BLOOD [[id number]] Collected: 02/21/21 0930    Order Status: Completed Specimen: Blood Updated: 02/26/21 0803     Special Requests: --        RIGHT  FOREARM       Culture result: NO GROWTH 5 DAYS             Current Meds:  Current Facility-Administered Medications   Medication Dose Route Frequency    albuterol (PROVENTIL HFA, VENTOLIN HFA, PROAIR HFA) inhaler 2 Puff  2 Puff Inhalation TID RT    phenol throat spray (CHLORASEPTIC) 1 Spray  1 Spray Oral PRN    nystatin (MYCOSTATIN) 100,000 unit/mL oral suspension 500,000 Units  500,000 Units Oral QID    loperamide (IMODIUM) capsule 2 mg  2 mg Oral Q4H PRN    LORazepam (ATIVAN) tablet 1 mg  1 mg Oral Q6H PRN    0.9% sodium chloride infusion 250 mL  250 mL IntraVENous PRN    amitriptyline (ELAVIL) tablet 10 mg  10 mg Oral QHS    benzonatate (TESSALON) capsule 100 mg  100 mg Oral TID PRN    famotidine (PEPCID) tablet 20 mg  20 mg Oral BID    guaiFENesin-codeine (ROBITUSSIN AC) 100-10 mg/5 mL solution 5 mL  5 mL Oral TID PRN    montelukast (SINGULAIR) tablet 10 mg  10 mg Oral DAILY    tiZANidine (ZANAFLEX) tablet 4 mg  4 mg Oral TID PRN    zolpidem (AMBIEN) tablet 5 mg  5 mg Oral QHS PRN    hyoscyamine SL (LEVSIN/SL) tablet 0.25 mg  0.25 mg SubLINGual Q4H PRN    enoxaparin (LOVENOX) injection 30 mg  30 mg SubCUTAneous Q12H    acetaminophen (TYLENOL) tablet 650 mg  650 mg Oral Q6H PRN    Or    acetaminophen (TYLENOL) suppository 650 mg  650 mg Rectal Q6H PRN    guaiFENesin (ROBITUSSIN) 100 mg/5 mL oral liquid 200 mg  200 mg Oral Q4H PRN    ondansetron (ZOFRAN) injection 4 mg  4 mg IntraVENous Q6H PRN       Diet:  DIET GI SOFT  DIET NUTRITIONAL SUPPLEMENTS    Other Studies (last 24 hours):  No results found. Assessment and Plan:     Hospital Problems as of 3/6/2021 Date Reviewed: 1/20/2021          Codes Class Noted - Resolved POA    * (Principal) Acute respiratory failure due to severe acute respiratory syndrome coronavirus 2 (SARS-CoV-2) infection ([facility]) ICD-10-CM: U07.1, J96.00  ICD-9-CM: 518.81, 079.89  2/21/2021 - Present         COVID-19 ICD-10-CM: U07.1  ICD-9-CM: 079.89  2/21/2021 - Present Unknown        Gastroesophageal reflux disease without esophagitis ICD-10-CM: K21.9  ICD-9-CM: 530.81  7/18/2018 - Present Yes        Dyslipidemia ICD-10-CM: E78.5  ICD-9-CM: 272.4  9/2/2015 - Present Yes                    Signed:  Roger CHÁVEZ

## 2021-03-07 LAB
ANION GAP SERPL CALC-SCNC: 4 MMOL/L (ref 7–16)
BASOPHILS # BLD: 0.1 K/UL (ref 0–0.2)
BASOPHILS NFR BLD: 1 % (ref 0–2)
BUN SERPL-MCNC: 18 MG/DL (ref 6–23)
CALCIUM SERPL-MCNC: 8.6 MG/DL (ref 8.3–10.4)
CHLORIDE SERPL-SCNC: 104 MMOL/L (ref 98–107)
CO2 SERPL-SCNC: 30 MMOL/L (ref 21–32)
CREAT SERPL-MCNC: 0.8 MG/DL (ref 0.8–1.5)
DIFFERENTIAL METHOD BLD: ABNORMAL
EOSINOPHIL # BLD: 0 K/UL (ref 0–0.8)
EOSINOPHIL NFR BLD: 0 % (ref 0.5–7.8)
ERYTHROCYTE [DISTWIDTH] IN BLOOD BY AUTOMATED COUNT: 12.2 % (ref 11.9–14.6)
GLUCOSE SERPL-MCNC: 91 MG/DL (ref 65–100)
HCT VFR BLD AUTO: 42.9 % (ref 41.1–50.3)
HGB BLD-MCNC: 14.6 G/DL (ref 13.6–17.2)
IMM GRANULOCYTES # BLD AUTO: 1.3 K/UL (ref 0–0.5)
IMM GRANULOCYTES NFR BLD AUTO: 9 % (ref 0–5)
LYMPHOCYTES # BLD: 1.5 K/UL (ref 0.5–4.6)
LYMPHOCYTES NFR BLD: 10 % (ref 13–44)
MCH RBC QN AUTO: 29.9 PG (ref 26.1–32.9)
MCHC RBC AUTO-ENTMCNC: 34 G/DL (ref 31.4–35)
MCV RBC AUTO: 87.9 FL (ref 79.6–97.8)
MONOCYTES # BLD: 1.2 K/UL (ref 0.1–1.3)
MONOCYTES NFR BLD: 8 % (ref 4–12)
NEUTS SEG # BLD: 10.6 K/UL (ref 1.7–8.2)
NEUTS SEG NFR BLD: 72 % (ref 43–78)
NRBC # BLD: 0 K/UL (ref 0–0.2)
PLATELET # BLD AUTO: 306 K/UL (ref 150–450)
PLATELET COMMENTS,PCOM: ADEQUATE
PMV BLD AUTO: 9.5 FL (ref 9.4–12.3)
POTASSIUM SERPL-SCNC: 4.3 MMOL/L (ref 3.5–5.1)
RBC # BLD AUTO: 4.88 M/UL (ref 4.23–5.6)
RBC MORPH BLD: ABNORMAL
SODIUM SERPL-SCNC: 138 MMOL/L (ref 136–145)
WBC # BLD AUTO: 14.7 K/UL (ref 4.3–11.1)
WBC MORPH BLD: ABNORMAL

## 2021-03-07 PROCEDURE — 65270000029 HC RM PRIVATE

## 2021-03-07 PROCEDURE — 74011250637 HC RX REV CODE- 250/637: Performed by: INTERNAL MEDICINE

## 2021-03-07 PROCEDURE — 74011250637 HC RX REV CODE- 250/637: Performed by: FAMILY MEDICINE

## 2021-03-07 PROCEDURE — 94760 N-INVAS EAR/PLS OXIMETRY 1: CPT

## 2021-03-07 PROCEDURE — 74011250636 HC RX REV CODE- 250/636: Performed by: INTERNAL MEDICINE

## 2021-03-07 PROCEDURE — 77010033711 HC HIGH FLOW OXYGEN

## 2021-03-07 PROCEDURE — 80048 BASIC METABOLIC PNL TOTAL CA: CPT

## 2021-03-07 PROCEDURE — 74011250637 HC RX REV CODE- 250/637: Performed by: HOSPITALIST

## 2021-03-07 PROCEDURE — 85025 COMPLETE CBC W/AUTO DIFF WBC: CPT

## 2021-03-07 PROCEDURE — 94640 AIRWAY INHALATION TREATMENT: CPT

## 2021-03-07 PROCEDURE — 94762 N-INVAS EAR/PLS OXIMTRY CONT: CPT

## 2021-03-07 RX ORDER — UREA 10 %
2 LOTION (ML) TOPICAL 2 TIMES DAILY
Status: DISCONTINUED | OUTPATIENT
Start: 2021-03-07 | End: 2021-03-11

## 2021-03-07 RX ORDER — FAMOTIDINE 20 MG/1
20 TABLET, FILM COATED ORAL 2 TIMES DAILY
Status: DISCONTINUED | OUTPATIENT
Start: 2021-03-07 | End: 2021-03-12 | Stop reason: HOSPADM

## 2021-03-07 RX ORDER — FUROSEMIDE 20 MG/1
20 TABLET ORAL DAILY
Status: DISCONTINUED | OUTPATIENT
Start: 2021-03-07 | End: 2021-03-10

## 2021-03-07 RX ORDER — POTASSIUM CHLORIDE 20 MEQ/1
20 TABLET, EXTENDED RELEASE ORAL DAILY
Status: DISCONTINUED | OUTPATIENT
Start: 2021-03-07 | End: 2021-03-10

## 2021-03-07 RX ADMIN — LACTOBACILLUS TAB 2 TABLET: TAB at 12:00

## 2021-03-07 RX ADMIN — ALBUTEROL SULFATE 2 PUFF: 108 INHALANT RESPIRATORY (INHALATION) at 19:40

## 2021-03-07 RX ADMIN — FUROSEMIDE 20 MG: 20 TABLET ORAL at 12:57

## 2021-03-07 RX ADMIN — ALBUTEROL SULFATE 2 PUFF: 108 INHALANT RESPIRATORY (INHALATION) at 14:30

## 2021-03-07 RX ADMIN — TEMAZEPAM 15 MG: 15 CAPSULE ORAL at 21:27

## 2021-03-07 RX ADMIN — NYSTATIN 500000 UNITS: 100000 SUSPENSION ORAL at 17:52

## 2021-03-07 RX ADMIN — LACTOBACILLUS TAB 2 TABLET: TAB at 17:52

## 2021-03-07 RX ADMIN — ENOXAPARIN SODIUM 30 MG: 100 INJECTION SUBCUTANEOUS at 11:59

## 2021-03-07 RX ADMIN — HYOSCYAMINE SULFATE 0.25 MG: 0.12 TABLET ORAL; SUBLINGUAL at 17:43

## 2021-03-07 RX ADMIN — LORAZEPAM 1 MG: 1 TABLET ORAL at 17:43

## 2021-03-07 RX ADMIN — POTASSIUM CHLORIDE 20 MEQ: 1500 TABLET, EXTENDED RELEASE ORAL at 12:57

## 2021-03-07 RX ADMIN — MONTELUKAST 10 MG: 10 TABLET, FILM COATED ORAL at 08:35

## 2021-03-07 RX ADMIN — ALBUTEROL SULFATE 2 PUFF: 108 INHALANT RESPIRATORY (INHALATION) at 07:41

## 2021-03-07 RX ADMIN — ACETAMINOPHEN 650 MG: 325 TABLET, FILM COATED ORAL at 17:43

## 2021-03-07 RX ADMIN — HYOSCYAMINE SULFATE 0.25 MG: 0.12 TABLET ORAL; SUBLINGUAL at 04:33

## 2021-03-07 RX ADMIN — NYSTATIN 500000 UNITS: 100000 SUSPENSION ORAL at 21:26

## 2021-03-07 RX ADMIN — NYSTATIN 500000 UNITS: 100000 SUSPENSION ORAL at 08:35

## 2021-03-07 RX ADMIN — FAMOTIDINE 40 MG: 20 TABLET ORAL at 08:34

## 2021-03-07 RX ADMIN — AMITRIPTYLINE HYDROCHLORIDE 10 MG: 10 TABLET, FILM COATED ORAL at 21:27

## 2021-03-07 RX ADMIN — HYOSCYAMINE SULFATE 0.25 MG: 0.12 TABLET ORAL; SUBLINGUAL at 00:39

## 2021-03-07 RX ADMIN — FAMOTIDINE 20 MG: 20 TABLET ORAL at 17:52

## 2021-03-07 NOTE — PROGRESS NOTES
[facility] Report was given to Ibis Harkins, [address] RN. Patient in no distress, and understands that he will be assigned a new nurse for the rest of the day shift.

## 2021-03-07 NOTE — PROGRESS NOTES
Hospitalist Progress Note     Admit Date:  2021  9:06 AM   Name:  Aubrey Matthews   Age:  64 y.o.  :  1960   MRN:  [id number]   PCP:  Aurea Richmond MD  Treatment Team: Attending Provider: Deepak Hunter DO; Care Manager: Byron Patricio LMSW; Utilization Review: Tho Fagan; Staff Nurse: Alexandre Ayala RN    Summary copied from past progress note:  \"58 y.o. male with medical h/o anxiety and GERD who was seen in the ER on 2021 and diagnosed with COVID-19 pneumonia. Keyla Hanks was discharged to home but per him he continued to get progressively worse and short of breath.  Per the patient he denies entire family are sick with the COVID-19. Carlota Kenyon did have a gathering for the Super Bowl and this is where he thinks he may have gotten infected. \" S/p convalescent plasma on . Last dose of remdesivir . S/p Ceftriaxone and doxycycline one dose. Subjective: Interval 3/7 since last visit-no new complaints still on high flow nasal cannula oxygen. But clinically stable with no increased oxygenation requirements. Subjectively feels about the same but reports increased bowel movements with pasty stool. No watery diarrhea. History of irritable bowel syndrome as outpatient. Discussed probiotic-he took lactobacillus at home on review of medication reconciliation. A/P:    Principal Problem:    Acute respiratory failure due to severe acute respiratory syndrome coronavirus 2 (SARS-CoV-2) infection ([facility] [address] (2021)  -continue supportive care with oxygenation as needed. -try fixed dose Lasix addition 20 mg daily with potassium and magnesium supplementation as needed         Dyslipidemia (2015)       Gastroesophageal reflux disease without esophagitis (2018)       COVID-19 (2021)     Acute hypoxic respiratory failure due to COVID-19 pneumonia-  3/5:  s/p CP. S/p remdesivir. Albuterol q6hr. S/p decadron for 10 days.    Added nystatin. Patient continues to be on air Vo 50 L. Currently on continuous pulse ox. Ordered for PCT in view of elevated WBC. Likely steroid-induced. March 6-monitor CBC off steroids. March 7-hopefully wean oxygen soon. Left renal  Mass -   3/5: No change   chronic and he states he has nephrologist who follows. March 6-emphasized to patient follow-up important. March 7-unchanged          March 7-diarrhea-now complaining of frequent pasty stools-continue PRN immodium, addition of probiotic twice daily. Discussed prebiotic with increased fiber.                 DVT prophylaxis - Lovenox     Objective:     Patient Vitals for the past 24 hrs:   Temp Pulse Resp BP SpO2   03/07/21 1130 98.2 °F (36.8 °C) (!) 102 16 100/73 95 %   03/07/21 0741 -- -- -- -- 93 %   03/07/21 0730 98.1 °F (36.7 °C) (!) 118 20 (!) 104/58 96 %   03/07/21 0401 97.9 °F (36.6 °C) 91 18 102/76 96 %   03/07/21 0257 -- -- -- -- 95 %   03/07/21 0026 98.2 °F (36.8 °C) 98 20 100/76 98 %   03/06/21 2353 97.7 °F (36.5 °C) (!) 101 20 94/69 98 %   03/06/21 2224 -- -- -- -- 92 %   03/06/21 1941 -- -- -- -- 96 %   03/06/21 1933 98.2 °F (36.8 °C) 96 -- 107/82 96 %   03/06/21 1622 98.1 °F (36.7 °C) (!) 111 -- 103/76 97 %     Oxygen Therapy  O2 Sat (%): 95 % (03/07/21 1130)  Pulse via Oximetry: 104 beats per minute (03/05/21 1950)  O2 Device: Hi flow nasal cannula; Heated (03/07/21 0741)  Skin Assessment: Clean, dry, & intact (03/06/21 1930)  O2 Flow Rate (L/min): 45 l/min (03/07/21 0741)  O2 Temperature: 87.8 °F (31 °C) (03/07/21 0741)  FIO2 (%): 50 % (03/07/21 0741)    Intake/Output Summary (Last 24 hours) at 3/7/2021 1228  Last data filed at 3/6/2021 2220  Gross per 24 hour   Intake --   Output 650 ml   Net -650 ml         REVIEW OF SYSTEMS: Comprehensive ROS performed and negative except as stated in HPI.     Physical Examination:  General:    Alert and oriented x3 no new complaints except frequent pasty stool  Head:  No deformity, nares without significant congestion  Eyes:  No disconjugate eye movements, pupils equal round and reactive  CV:   No S3 gallop, no increased lower extremity edema. No JVD  Lungs:   No gross consolidation, faint pulmonary rales mid zones bilateral.  Abdomen:   No ascites. Bowel sounds present all 4 quadrants. Extremities: No deformity, moves all extremities symmetrically  Skin:    No petechia or purpura  Neuro:  No past-pointing, no dysdiadochokinesia      Data Review:  I have reviewed all labs, meds, telemetry events, and studies from the last 24 hours. Recent Results (from the past 24 hour(s))   CBC WITH AUTOMATED DIFF    Collection Time: 03/07/21  4:27 AM   Result Value Ref Range    WBC 14.7 (H) 4.3 - 11.1 K/uL    RBC 4.88 4.23 - 5.6 M/uL    HGB 14.6 13.6 - 17.2 g/dL    HCT 42.9 41.1 - 50.3 %    MCV 87.9 79.6 - 97.8 FL    MCH 29.9 26.1 - 32.9 PG    MCHC 34.0 31.4 - 35.0 g/dL    RDW 12.2 11.9 - 14.6 %    PLATELET 876 805 - 221 K/uL    MPV 9.5 9.4 - 12.3 FL    ABSOLUTE NRBC 0.00 0.0 - 0.2 K/uL    NEUTROPHILS 72 43 - 78 %    LYMPHOCYTES 10 (L) 13 - 44 %    MONOCYTES 8 4.0 - 12.0 %    EOSINOPHILS 0 (L) 0.5 - 7.8 %    BASOPHILS 1 0.0 - 2.0 %    IMMATURE GRANULOCYTES 9 (H) 0.0 - 5.0 %    ABS. NEUTROPHILS 10.6 (H) 1.7 - 8.2 K/UL    ABS. LYMPHOCYTES 1.5 0.5 - 4.6 K/UL    ABS. MONOCYTES 1.2 0.1 - 1.3 K/UL    ABS. EOSINOPHILS 0.0 0.0 - 0.8 K/UL    ABS. BASOPHILS 0.1 0.0 - 0.2 K/UL    ABS. IMM. GRANS.  1.3 (H) 0.0 - 0.5 K/UL    RBC COMMENTS SLIGHT  ANISOCYTOSIS + POIKILOCYTOSIS        WBC COMMENTS Result Confirmed By Smear      PLATELET COMMENTS ADEQUATE      DF AUTOMATED     METABOLIC PANEL, BASIC    Collection Time: 03/07/21  4:27 AM   Result Value Ref Range    Sodium 138 136 - 145 mmol/L    Potassium 4.3 3.5 - 5.1 mmol/L    Chloride 104 98 - 107 mmol/L    CO2 30 21 - 32 mmol/L    Anion gap 4 (L) 7 - 16 mmol/L    Glucose 91 65 - 100 mg/dL    BUN 18 6 - 23 MG/DL    Creatinine 0.80 0.8 - 1.5 MG/DL    GFR est AA >60 >60 ml/min/1.73m2 GFR est non-AA >60 >60 ml/min/1.73m2    Calcium 8.6 8.3 - 10.4 MG/DL        All Micro Results     Procedure Component Value Units Date/Time    CULTURE, BLOOD [[id number]] Collected: 02/21/21 0921    Order Status: Completed Specimen: Blood Updated: 02/26/21 0803     Special Requests: --        LEFT  Antecubital       Culture result: NO GROWTH 5 DAYS       CULTURE, BLOOD [[id number]] Collected: 02/21/21 0930    Order Status: Completed Specimen: Blood Updated: 02/26/21 0803     Special Requests: --        RIGHT  FOREARM       Culture result: NO GROWTH 5 DAYS             Current Meds:  Current Facility-Administered Medications   Medication Dose Route Frequency    Lactobacillus Acidoph & Bulgar (FLORANEX) tablet 2 Tab  2 Tab Oral BID    furosemide (LASIX) tablet 20 mg  20 mg Oral DAILY    potassium chloride (K-DUR, KLOR-CON) SR tablet 20 mEq  20 mEq Oral DAILY    famotidine (PEPCID) tablet 20 mg  20 mg Oral BID    bismuth subsalicylate (PEPTO-BISMOL) oral suspension 30 mL  30 mL Oral Q6H PRN    temazepam (RESTORIL) capsule 15 mg  15 mg Oral QHS PRN    albuterol (PROVENTIL HFA, VENTOLIN HFA, PROAIR HFA) inhaler 2 Puff  2 Puff Inhalation TID RT    phenol throat spray (CHLORASEPTIC) 1 Spray  1 Spray Oral PRN    nystatin (MYCOSTATIN) 100,000 unit/mL oral suspension 500,000 Units  500,000 Units Oral QID    loperamide (IMODIUM) capsule 2 mg  2 mg Oral Q4H PRN    LORazepam (ATIVAN) tablet 1 mg  1 mg Oral Q6H PRN    0.9% sodium chloride infusion 250 mL  250 mL IntraVENous PRN    amitriptyline (ELAVIL) tablet 10 mg  10 mg Oral QHS    benzonatate (TESSALON) capsule 100 mg  100 mg Oral TID PRN    guaiFENesin-codeine (ROBITUSSIN AC) 100-10 mg/5 mL solution 5 mL  5 mL Oral TID PRN    montelukast (SINGULAIR) tablet 10 mg  10 mg Oral DAILY    tiZANidine (ZANAFLEX) tablet 4 mg  4 mg Oral TID PRN    hyoscyamine SL (LEVSIN/SL) tablet 0.25 mg  0.25 mg SubLINGual Q4H PRN    enoxaparin (LOVENOX) injection 30 mg  30 mg SubCUTAneous Q12H    acetaminophen (TYLENOL) tablet 650 mg  650 mg Oral Q6H PRN    Or    acetaminophen (TYLENOL) suppository 650 mg  650 mg Rectal Q6H PRN    guaiFENesin (ROBITUSSIN) 100 mg/5 mL oral liquid 200 mg  200 mg Oral Q4H PRN    ondansetron (ZOFRAN) injection 4 mg  4 mg IntraVENous Q6H PRN       Diet:  DIET GI SOFT  DIET NUTRITIONAL SUPPLEMENTS    Other Studies (last 24 hours):  No results found. Assessment and Plan:     Hospital Problems as of 3/7/2021 Date Reviewed: 1/20/2021          Codes Class Noted - Resolved POA    * (Principal) Acute respiratory failure due to severe acute respiratory syndrome coronavirus 2 (SARS-CoV-2) infection (HCC) ICD-10-CM: U07.1, J96.00  ICD-9-CM: 518.81, 079.89  2/21/2021 - Present         COVID-19 ICD-10-CM: U07.1  ICD-9-CM: 079.89  2/21/2021 - Present Unknown        Gastroesophageal reflux disease without esophagitis ICD-10-CM: K21.9  ICD-9-CM: 530.81  7/18/2018 - Present Yes        Dyslipidemia ICD-10-CM: E78.5  ICD-9-CM: 272.4  9/2/2015 - Present Yes                    Signed:  Roger CHÁVEZ

## 2021-03-08 PROCEDURE — 94640 AIRWAY INHALATION TREATMENT: CPT

## 2021-03-08 PROCEDURE — 65270000029 HC RM PRIVATE

## 2021-03-08 PROCEDURE — 74011250637 HC RX REV CODE- 250/637: Performed by: INTERNAL MEDICINE

## 2021-03-08 PROCEDURE — 74011250636 HC RX REV CODE- 250/636: Performed by: INTERNAL MEDICINE

## 2021-03-08 PROCEDURE — 77010033711 HC HIGH FLOW OXYGEN

## 2021-03-08 PROCEDURE — 74011250637 HC RX REV CODE- 250/637: Performed by: FAMILY MEDICINE

## 2021-03-08 PROCEDURE — 97535 SELF CARE MNGMENT TRAINING: CPT

## 2021-03-08 PROCEDURE — 94762 N-INVAS EAR/PLS OXIMTRY CONT: CPT

## 2021-03-08 PROCEDURE — 74011250637 HC RX REV CODE- 250/637: Performed by: HOSPITALIST

## 2021-03-08 PROCEDURE — 97530 THERAPEUTIC ACTIVITIES: CPT

## 2021-03-08 RX ADMIN — ENOXAPARIN SODIUM 30 MG: 100 INJECTION SUBCUTANEOUS at 23:57

## 2021-03-08 RX ADMIN — POTASSIUM CHLORIDE 20 MEQ: 1500 TABLET, EXTENDED RELEASE ORAL at 08:45

## 2021-03-08 RX ADMIN — TEMAZEPAM 15 MG: 15 CAPSULE ORAL at 21:43

## 2021-03-08 RX ADMIN — ALBUTEROL SULFATE 2 PUFF: 108 INHALANT RESPIRATORY (INHALATION) at 20:37

## 2021-03-08 RX ADMIN — FUROSEMIDE 20 MG: 20 TABLET ORAL at 08:45

## 2021-03-08 RX ADMIN — MONTELUKAST 10 MG: 10 TABLET, FILM COATED ORAL at 08:45

## 2021-03-08 RX ADMIN — ALBUTEROL SULFATE 2 PUFF: 108 INHALANT RESPIRATORY (INHALATION) at 08:49

## 2021-03-08 RX ADMIN — FAMOTIDINE 20 MG: 20 TABLET ORAL at 08:46

## 2021-03-08 RX ADMIN — ENOXAPARIN SODIUM 30 MG: 100 INJECTION SUBCUTANEOUS at 12:53

## 2021-03-08 RX ADMIN — AMITRIPTYLINE HYDROCHLORIDE 10 MG: 10 TABLET, FILM COATED ORAL at 21:43

## 2021-03-08 RX ADMIN — LACTOBACILLUS TAB 2 TABLET: TAB at 08:45

## 2021-03-08 RX ADMIN — NYSTATIN 500000 UNITS: 100000 SUSPENSION ORAL at 21:43

## 2021-03-08 RX ADMIN — NYSTATIN 500000 UNITS: 100000 SUSPENSION ORAL at 08:46

## 2021-03-08 RX ADMIN — LACTOBACILLUS TAB 2 TABLET: TAB at 17:15

## 2021-03-08 RX ADMIN — ENOXAPARIN SODIUM 30 MG: 100 INJECTION SUBCUTANEOUS at 00:16

## 2021-03-08 RX ADMIN — ALBUTEROL SULFATE 2 PUFF: 108 INHALANT RESPIRATORY (INHALATION) at 13:57

## 2021-03-08 RX ADMIN — FAMOTIDINE 20 MG: 20 TABLET ORAL at 17:15

## 2021-03-08 RX ADMIN — NYSTATIN 500000 UNITS: 100000 SUSPENSION ORAL at 17:15

## 2021-03-08 RX ADMIN — NYSTATIN 500000 UNITS: 100000 SUSPENSION ORAL at 12:53

## 2021-03-08 NOTE — PROGRESS NOTES
ACUTE OT GOALS:  (Developed with and agreed upon by patient and/or caregiver.)  1) Patient will complete lower body bathing and dressing with stand by assistance and adaptive equipment as needed. GOAL MET   2) Patient will complete toileting with stand by assistance. GOAL MET  3) Patient will complete functional transfers with stand by assistance and adaptive equipment as needed. 4) Patient will tolerate at least 25 minutes of OT activity with 1-2 rest breaks while maintaining O2 sats >90%. 5) Patient will verbalize at least 3 energy conservation technique to utilize during ADL/IADL. Timeframe: 7 visits     OCCUPATIONAL THERAPY: Daily Note OT Treatment Day # 3    Anusha Clark is a 64 y.o. male   PRIMARY DIAGNOSIS: Acute respiratory failure due to severe acute respiratory syndrome coronavirus 2 (SARS-CoV-2) infection ([facility]  COVID-19 [U07.1]  Respiratory failure with hypoxia ([facility] [J96.91]       Payor: [employer] / Plan: [facility] [employer] / Product Type: SERENA /   ASSESSMENT:     REHAB RECOMMENDATIONS: CURRENT LEVEL OF FUNCTION:  (Most Recently Demonstrated)   Recommendation to date pending progress:  [address] [place] [address] Therapy  Equipment:    To Be Determined Bathing:   Standby Assistance  Dressing:   Standby Assistance  Feeding/Grooming:   Set Up  Toileting:   Supervision  Functional Mobility:   Standby Assistance     ASSESSMENT:  Mr. Charo Urrutia was able to maintain O2 sats >90% during entire session (seated and standing ADLs) and is on less support than before. Educated patient on energy conservation techniques to utilize during ADLs/ IADLs. Good progress! SUBJECTIVE:   Mr. Charo Urrutia states, \"My whole family has had this virus, but I got it the worst.\"    SOCIAL HISTORY/LIVING ENVIRONMENT:  Pt lives with family in a 2-story home but lives on 1st level. Pt has tub shower without shower chair and no AD at home. Pt works, drives and has family to assist if needed.    Home Environment: Private residence  One/Two Story Residence: Two story  Living Alone: No  Support Systems: Family member(s)    OBJECTIVE:     PAIN: VITAL SIGNS: LINES/DRAINS:   Pre Treatment:  0  Post Treatment: 0 Vital Signs  O2 Sat (%): 95 %  O2 Device: Heated; Hi flow nasal cannula  O2 Flow Rate (L/min): 40 l/min  FIO2 (%): 40 % IV  O2 Device: Heated, Hi flow nasal cannula     ACTIVITIES OF DAILY LIVING: I Mod I S SBA CGA Min Mod Max Total NT Comments   BASIC ADLs:              Bathing/ Showering [] [] [x] [x] [] [] [] [] [] [] Setup for seated;   SBAfor standing    Toileting [] [] [x] [] [] [] [] [] [] [] Urinal    Dressing [] [] [] [x] [] [] [] [] [] [] SBA for LB dressing, UB dressing    Feeding [] [] [] [] [] [] [] [] [] [x]    Grooming [] [] [x] [] [] [] [] [] [] [] Washing face, brushing teeth, combing hair all from seated position    Personal Device Care [x] [] [] [] [] [] [] [] [] [] laptop   Functional Mobility [] [] [] [x] [] [] [] [] [] []    I=Independent, Mod I=Modified Independent, S=Supervision, SBA=Standby Assistance, CGA=Contact Guard Assistance,   Min=Minimal Assistance, Mod=Moderate Assistance, Max=Maximal Assistance, Total=Total Assistance, NT=Not Tested    MOBILITY: I Mod I S SBA CGA Min Mod Max Total  NT x2 Comments:   Supine to sit [] [] [] [] [] [] [] [] [] [x] []    Sit to supine [] [] [] [] [] [] [] [] [] [x] []    Sit to stand [] [] [] [x] [] [] [] [] [] [] []    Bed to chair [] [] [] [] [] [] [] [] [] [x] []    I=Independent, Mod I=Modified Independent, S=Supervision, SBA=Standby Assistance, CGA=Contact Guard Assistance,   Min=Minimal Assistance, Mod=Moderate Assistance, Max=Maximal Assistance, Total=Total Assistance, NT=Not Tested    PLAN:   FREQUENCY/DURATION: OT Plan of Care: 3 times/week for duration of hospital stay or until stated goals are met, whichever comes first.    TREATMENT:   TREATMENT:   ($$ Self Care/Home Management: 23-37 mins    )  Self Care (31 Minutes): Self care including Upper Body Bathing, Lower Body Bathing, Toileting, Upper Body Dressing, Lower Body Dressing, Personal Device Care and Grooming to increase independence and activity tolerance. Energy conservation education provided for ADLs/IADLs.      AFTER TREATMENT POSITION/PRECAUTIONS:  Chair, Needs within reach and RN notified    INTERDISCIPLINARY COLLABORATION:  RN/PCT and OT/NIELSEN    TOTAL TREATMENT DURATION:  OT Patient Time In/Time Out  Time In: 7922  Time Out: 400 [facility] Siddhartha Norris OTR/ROBERTA

## 2021-03-08 NOTE — PROGRESS NOTES
ACUTE PHYSICAL THERAPY GOALS:  (Developed with and agreed upon by patient and/or caregiver. )  LTG:  (1.)Mr. Melva Swartz will move from supine to sit and sit to supine , scoot up and down, and roll side to side in bed with INDEPENDENT within 7 treatment day(s). met 3/8/2021   (2.)Mr. Melva Swartz will transfer from bed to chair and chair to bed with SUPERVISION using the least restrictive device within 7 treatment day(s). (3.)Mr. Melva Swartz will ambulate with SUPERVISION for 100+ feet with the least restrictive device within 7 treatment day(s). (4.)Mr. Melva Swartz will perform sit-stand x5  INDEPENDENT without using UEs under 18 seconds within 7 treatment day(s). PHYSICAL THERAPY: Daily Note Treatment Day # 6    Celio Irizarry is a 64 y.o. male   PRIMARY DIAGNOSIS: Acute respiratory failure due to severe acute respiratory syndrome coronavirus 2 (SARS-CoV-2) infection ([facility]  COVID-19 [U07.1]  Respiratory failure with hypoxia ([facility] [J96.91]         ASSESSMENT:     REHAB RECOMMENDATIONS: CURRENT LEVEL OF FUNCTION:  (Most Recently Demonstrated)   Recommendation to date pending progress:  [address] [place] [address]  Equipment:    To Be Determined Bed Mobility:   Independent  Sit to Stand:  [employer] Assistance  Transfers:   Standby Assistance  Gait/Mobility:   Contact Guard Assistance     ASSESSMENT:  Mr. Melva Swartz was prone in bed on nasal cannula. Switched to [employer] for PT. Worked on standing ex's and ambulation. spO2 mostly in 90's with frequent cueing for breathing. He demonstrated progress with activity tolerance and gait today. SUBJECTIVE:   Mr. Melva Swartz states, \"I'm coughing up a lot. 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 Desires to return when cleared.    Home Environment: Private residence  One/Two Story Residence: Two story  Living Alone: No  Support Systems: Family member(s)  OBJECTIVE:     PAIN: VITAL SIGNS: LINES/DRAINS:   Pre Treatment: Pain Screen  Pain Scale 1: Numeric (0 - 10)  Pain Intensity 1: 0  Post Treatment: 0   continuous O2 sat  O2 Device: Hi flow nasal cannula     MOBILITY: I Mod I S SBA CGA Min Mod Max Total  NT x2 Comments:   Bed Mobility    Rolling [x] [] [] [] [] [] [] [] [] [] []    Supine to Sit [x] [] [] [] [] [] [] [] [] [] []    Scooting [x] [] [] [] [] [] [] [] [] [] []    Sit to Supine [x] [] [] [] [] [] [] [] [] [] []    Transfers    Sit to Stand [] [] [] [x] [] [] [] [] [] [] []    Bed to Chair [] [] [] [] [] [] [] [] [] [x] []    Stand to Sit [] [] [] [x] [] [] [] [] [] [] []    I=Independent, Mod I=Modified Independent, S=Supervision, SBA=Standby Assistance, CGA=Contact Guard Assistance,   Min=Minimal Assistance, Mod=Moderate Assistance, Max=Maximal Assistance, Total=Total Assistance, NT=Not Tested    BALANCE: Good Fair+ Fair Fair- Poor NT Comments   Sitting Static [x] [] [] [] [] []    Sitting Dynamic [x] [] [] [] [] []              Standing Static [] [] [x] [] [] []    Standing Dynamic [] [] [x] [] [] []      GAIT: I Mod I S SBA CGA Min Mod Max Total  NT x2 Comments:   Level of Assistance [] [] [] [x] [x] [] [] [] [] [] []    Distance 36' x2    DME holding furniture as needed    Gait Quality Slow, cautious, decreased step length. Weightbearing  Status N/A     I=Independent, Mod I=Modified Independent, S=Supervision, SBA=Standby Assistance, CGA=Contact Guard Assistance,   Min=Minimal Assistance, Mod=Moderate Assistance, Max=Maximal Assistance, Total=Total Assistance, NT=Not Tested    PLAN:   FREQUENCY/DURATION: PT Plan of Care: 3 times/week for duration of hospital stay or until stated goals are met, whichever comes first.  TREATMENT:     TREATMENT:   ($$ Therapeutic Activity: 8-22 mins    )  Therapeutic Activity (25 Minutes):  Therapeutic activity included Supine to Sit, Sit to Supine, Scooting, Transfer Training, Ambulation on level ground, Sitting balance , Standing balance and exercises to improve functional Mobility, Strength and Activity tolerance. Performs standing ex's holding onto siderail.     TREATMENT GRID:  N/A    Date: 3/01/21 3/02/21 03/04/21 3/5/21 3/8/21    Ambulation  Device  assistance         Partial Squats    x20 AB     Hip Abduction/ Adduction Standing x10 AB    x10 AB    Heel Raises  Standing x10 AB x10 AB x10 AB x20 AB 2x10 AB    Toe Raises Standing x10 AB  x10 AB x20 AB 2x10 AB    Hip Flexion Standing x10 AB  x10 AB x20AB 2x10 AB    Sit to Stand 4x        Long arc quads 2x10 AB        Heel cord stretch  x2 [place]       Ankle pumps x10 AB        Long arc quads  2x10 AB       Hip flexion in sit  2x10 AB       Hip ab/ad sit  2x10 AB       Key:  R=right, L=left, B=bilaterally, A=active, AA=active assisted, P=passive    AFTER TREATMENT POSITION/PRECAUTIONS:  Bed, Needs within reach and RN notified    INTERDISCIPLINARY COLLABORATION:  RN/PCT and PT/PTA    TOTAL TREATMENT DURATION:  PT Patient Time In/Time Out  Time In: 1605  Time Out: [address], PT, DPT

## 2021-03-08 NOTE — PROGRESS NOTES
Hospitalist Progress Note     Admit Date:  2021  9:06 AM   Name:  Ronnell Snellen   Age:  64 y.o.  :  1960   MRN:  [id number]   PCP:  Clement Baumann MD  Treatment Team: Attending Provider: Miki Ruiz DO; Care Manager: Adolfo Villaseñor LMSW; Utilization Review: Ok Bass; Staff Nurse: Chayo Chaudhry, RN; Primary Nurse: Louann Jaime, RN; Staff Nurse: Beulah Wall, KAIA; Physical Therapist: Antione De Santiago, PT, DPT; Occupational Therapist: Ham Rodríguez, OTR/L    Summary copied from past progress note:  \"58 y.o. male with medical h/o anxiety and GERD who was seen in the ER on 2021 and diagnosed with COVID-19 pneumonia. [facility] was discharged to home but per him he continued to get progressively worse and short of breath.  Per the patient he denies entire family are sick with the COVID-19. Amelia Culver did have a gathering for the Super Bowl and this is where he thinks he may have gotten infected. \" S/p convalescent plasma on . Last dose of remdesivir . S/p Ceftriaxone and doxycycline one dose. Subjective: Interval 3/8 since last visit-  Feels better with decreased bowel movements since starting Lactobacillus acidophilus. Previous pasty frequent stools decreased stool volume and frequency. Oxygenation requirements are still high but improving slightly with 40% FiO2 airflow high flow. Subjectively feels as if he is improving last 48 hours. A/P:    Principal Problem:    Acute respiratory failure due to severe acute respiratory syndrome coronavirus 2 (SARS-CoV-2) infection ([facility] (2021)  -continue supportive care with oxygenation as needed.   -try fixed dose Lasix addition 20 mg daily with potassium and magnesium supplementation as needed  -improved vkewsfirksq-rlwevd-sw labs in the a.m.-Monitoring electrolytes and creatinine         Dyslipidemia (2015)       Gastroesophageal reflux disease without esophagitis (7/18/2018)       COVID-19 (2/21/2021)     Acute hypoxic respiratory failure due to COVID-19 pneumonia-  3/5:  s/p CP. S/p remdesivir. Albuterol q6hr. S/p decadron for 10 days. Added nystatin. Patient continues to be on air Vo 50 L. Currently on continuous pulse ox. Ordered for PCT in view of elevated WBC. Likely steroid-induced. March 6-monitor CBC off steroids. March 7-hopefully wean oxygen soon. March 8-wean oxygen. Left renal  Mass -   3/5: No change   chronic and he states he has nephrologist who follows. March 6-emphasized to patient follow-up important. March 7-unchanged            March 7-diarrhea-now complaining of frequent pasty stools-continue PRN immodium, addition of probiotic twice daily. Discussed prebiotic with increased fiber. March 8-improved with probiotic-continue same and monitor. Likely related to COVID-19-did have course of recent antibiotics but pasty/semiformed stool                 DVT prophylaxis - Lovenox     Objective:     Patient Vitals for the past 24 hrs:   Temp Pulse Resp BP SpO2   03/08/21 0849 -- -- -- -- 96 %   03/08/21 0748 97.4 °F (36.3 °C) (!) 102 17 90/78 93 %   03/08/21 0311 98.2 °F (36.8 °C) 93 18 92/66 96 %   03/08/21 0300 -- -- -- -- 96 %   03/07/21 2323 -- -- -- -- 94 %   03/07/21 2259 97.9 °F (36.6 °C) 90 18 (!) 94/59 93 %   03/07/21 2027 98.2 °F (36.8 °C) (!) 106 20 97/67 96 %   03/07/21 1940 -- -- -- -- 97 %   03/07/21 1530 98.3 °F (36.8 °C) (!) 121 20 91/73 96 %   03/07/21 1430 -- -- -- -- 98 %     Oxygen Therapy  O2 Sat (%): 96 % (03/08/21 0849)  Pulse via Oximetry: 102 beats per minute (03/08/21 0849)  O2 Device: Heated; Hi flow nasal cannula (03/08/21 0849)  Skin Assessment: Clean, dry, & intact (03/06/21 1930)  O2 Flow Rate (L/min): 40 l/min(45 decerased to 40) (03/08/21 0849)  O2 Temperature: 87.8 °F (31 °C) (03/08/21 0849)  FIO2 (%): 40 %(50 decreased to 40) (03/08/21 0849)    Intake/Output Summary (Last 24 hours) at 3/8/2021 1209  Last data filed at 3/8/2021 0314  Gross per 24 hour   Intake --   Output 700 ml   Net -700 ml         REVIEW OF SYSTEMS: Comprehensive ROS performed and negative except as stated in HPI. Physical Examination:  General:    Alert and oriented x3-GI symptoms improved. Head:  Oral mucosa moist.  Neck veins flat no facial asymmetry  Eyes:  No corneal scarring, conjunctive are clear  CV:   Regular rate rhythm, no increased lower extremity edema. Lungs:   Faint pulmonary rales on mid inspiration appear to be improving/decreased. No wheezing. Abdomen:   No distention, no tympany. Extremities: No deformity, moves all extremities symmetrically  Skin:     No rash, no breakdown. Neuro:  No focal motor deficits, no tremor      Data Review:  I have reviewed all labs, meds, telemetry events, and studies from the last 24 hours. No results found for this or any previous visit (from the past 24 hour(s)).      All Micro Results     Procedure Component Value Units Date/Time    CULTURE, BLOOD [[id number]] Collected: 02/21/21 0921    Order Status: Completed Specimen: Blood Updated: 02/26/21 0803     Special Requests: --        LEFT  Antecubital       Culture result: NO GROWTH 5 DAYS       CULTURE, BLOOD [[id number]] Collected: 02/21/21 0930    Order Status: Completed Specimen: Blood Updated: 02/26/21 0803     Special Requests: --        RIGHT  FOREARM       Culture result: NO GROWTH 5 DAYS             Current Meds:  Current Facility-Administered Medications   Medication Dose Route Frequency    Lactobacillus Acidoph & Bulgar (FLORANEX) tablet 2 Tab  2 Tab Oral BID    furosemide (LASIX) tablet 20 mg  20 mg Oral DAILY    potassium chloride (K-DUR, KLOR-CON) SR tablet 20 mEq  20 mEq Oral DAILY    famotidine (PEPCID) tablet 20 mg  20 mg Oral BID    bismuth subsalicylate (PEPTO-BISMOL) oral suspension 30 mL  30 mL Oral Q6H PRN    temazepam (RESTORIL) capsule 15 mg  15 mg Oral QHS PRN    albuterol (PROVENTIL HFA, VENTOLIN HFA, PROAIR HFA) inhaler 2 Puff  2 Puff Inhalation TID RT    phenol throat spray (CHLORASEPTIC) 1 Spray  1 Spray Oral PRN    nystatin (MYCOSTATIN) 100,000 unit/mL oral suspension 500,000 Units  500,000 Units Oral QID    loperamide (IMODIUM) capsule 2 mg  2 mg Oral Q4H PRN    LORazepam (ATIVAN) tablet 1 mg  1 mg Oral Q6H PRN    0.9% sodium chloride infusion 250 mL  250 mL IntraVENous PRN    amitriptyline (ELAVIL) tablet 10 mg  10 mg Oral QHS    benzonatate (TESSALON) capsule 100 mg  100 mg Oral TID PRN    guaiFENesin-codeine (ROBITUSSIN AC) 100-10 mg/5 mL solution 5 mL  5 mL Oral TID PRN    montelukast (SINGULAIR) tablet 10 mg  10 mg Oral DAILY    tiZANidine (ZANAFLEX) tablet 4 mg  4 mg Oral TID PRN    hyoscyamine SL (LEVSIN/SL) tablet 0.25 mg  0.25 mg SubLINGual Q4H PRN    enoxaparin (LOVENOX) injection 30 mg  30 mg SubCUTAneous Q12H    acetaminophen (TYLENOL) tablet 650 mg  650 mg Oral Q6H PRN    Or    acetaminophen (TYLENOL) suppository 650 mg  650 mg Rectal Q6H PRN    guaiFENesin (ROBITUSSIN) 100 mg/5 mL oral liquid 200 mg  200 mg Oral Q4H PRN    ondansetron (ZOFRAN) injection 4 mg  4 mg IntraVENous Q6H PRN       Diet:  DIET GI SOFT  DIET NUTRITIONAL SUPPLEMENTS    Other Studies (last 24 hours):  No results found. Assessment and Plan:     Hospital Problems as of 3/8/2021 Date Reviewed: 1/20/2021          Codes Class Noted - Resolved POA    * (Principal) Acute respiratory failure due to severe acute respiratory syndrome coronavirus 2 (SARS-CoV-2) infection (HCC) ICD-10-CM: U07.1, J96.00  ICD-9-CM: 518.81, 079.89  2/21/2021 - Present         COVID-19 ICD-10-CM: U07.1  ICD-9-CM: 079.89  2/21/2021 - Present Unknown        Gastroesophageal reflux disease without esophagitis ICD-10-CM: K21.9  ICD-9-CM: 530.81  7/18/2018 - Present Yes        Dyslipidemia ICD-10-CM: E78.5  ICD-9-CM: 272.4  9/2/2015 - Present Yes                    Signed:  Roger CHÁVEZ

## 2021-03-08 NOTE — ADT AUTH CERT NOTES
Pneumonia - Care Day 14 (3/6/2021) by Lebron Hernández       Review Status Review Entered   Completed 3/8/2021 09:02      Criteria Review      Care Day: 14 Care Date: 3/6/2021 Level of Care: Inpatient Floor    Guideline Day 2    Clinical Status    (X) * No CO2 retention or acidosis    (X) * No requirement for mechanical ventilation    (X) * Hypotension absent    3/8/2021 09:02:37 EST by Lebron Hernández      BP 90/67, P     (X) * Afebrile or fever improved    3/8/2021 09:02:37 EST by Lebron Hernández      98.6    ( ) * No hypoxia on room air or oxygenation improved    3/8/2021 09:02:37 EST by Lebron Hernández      02 SAT 92-97% 45L HEATED HFNC  RA SATS NOT CHECKED    (X) * Mental status improved or at baseline    3/8/2021 09:02:37 EST by Lebron Hernández      Psych:             Mood and affect appropriate    Activity    (X) * Increased activity    Routes    (X) Oral hydration, medications    3/8/2021 09:02:37 EST by Lebron Hernández      IMODIUM 2MG PO Q 4 HRS PRN X1,  RESTORIL 15MG PO HS PRN X1, ZANAFLEX 4MG PO TID PRN X 1,  GI COCKTAIL 40ML PO X1,   OTHER SCHED MEDS ARE SAME    Interventions    (X) Pulse oximetry    (X) Possible oxygen    3/8/2021 09:02:37 EST by Lebron Hernández      02 45L HEATED HFNC    * Milestone   Additional Notes   Hospitalist Progress Note      Interval since last visit-no new complaints still on high flow nasal cannula oxygen.  No significant diarrhea.  Admitted with acute hypoxemic respiratory failure due to COVID-19 pneumonia and finished convalescent plasma dosed February 22 and course of remdesivir 5 days finished February 25. EXAM: General:          Well nourished.  No gross distress. Head:               Normocephalic, atraumatic, nares patent   Eyes:               Extraocular movements intact, normal sclera   CV:                  RRR.  No  Murmurs, clicks, or gallops, distal pulses intact   Lungs:             Still high flow nasal cannula oxygen.  No gross consolidation.   Abdomen:        Soft, nondistended, nontender.    Extremities:     Warm and dry.  No cyanosis or edema.    Skin:                No rashes or jaundice.    Neuro:             No gross focal deficits, no tremor   Psych:             Mood and affect appropriate                A/P:     Principal Problem:     Acute respiratory failure due to severe acute respiratory syndrome coronavirus 2 (SARS-CoV-2) infection ([facility]) (2/21/2021)   March 6-continue supportive care with oxygenation as needed.             Dyslipidemia (9/2/2015)         Gastroesophageal reflux disease without esophagitis (7/18/2018)         COVID-19 (2/21/2021)       Acute hypoxic respiratory failure due to COVID-19 pneumonia-   3/5:   s/p CP. S/p remdesivir. Albuterol q6hr. S/p decadron for 10 days.    Added nystatin.    Patient continues to be on air Vo 50 L.  Currently on continuous pulse ox.   Ordered for PCT in view of elevated WBC. Likely steroid-induced.   March 6-monitor CBC off steroids.           Left renal  Mass -    3/5: No change    chronic and he states he has nephrologist who follows.    March 6-emphasized to patient follow-up important       Nausea - PRN Zofran.        Diarrhea - PRN immodium    March 6-seems to be improving or he is minimizing.

## 2021-03-08 NOTE — PROGRESS NOTES
Patient off AirVo, placed on 3L NC, patient is proned. Rashard Ask is running and patient instructed to go back to AirVo if he becomes short of breath.

## 2021-03-08 NOTE — PROGRESS NOTES
Comprehensive Nutrition Assessment  This assessment was completed remotely   Type and Reason for Visit: Reassess      Nutrition Recommendations/Plan:    Continue with current diet   Continue with Ensure Enlive with all meals       Nutrition Assessment:   Nutrition History: 3/1: Unable to obtain hx at this time      Nutrition Background: Patient presented with worsening shortness of breath. Respiratory failure related to covid-19. Daily Update:  Pt does not answer room phone. RN reports pt has been on AIRVO today but has been able to be weaned down and may be able to transition to nasal cannula. The Michael Code has not presented him from eating and he has been able to feeding himself. RN assess continued need for Ensure Enlive to assure adequate intake and to be available in case he has a set back. Nutrition Related Findings:   NFPE deferred d/t isolation      Current Nutrition Therapies:  DIET GI SOFT No options chosen  DIET NUTRITIONAL SUPPLEMENTS All Meals; Ensure Enlive ( )    Current Intake:   Average Meal Intake: 51-75%(Average intake for 3 recorded meal for past 1 week: 75%) Average Supplement Intake: %(for 2 recording)      Anthropometric Measures:  Height: 5' 8\" (172.7 cm)  Current Body Wt: 84 kg (185 lb 3 oz)(2/21), Weight source: Stated  BMI: 28.2, Overweight (BMI 25.0-29. 9)     Ideal Body Weight (lbs) (Calculated): 154 lbs (70 kg), 120.3 %          Edema: No data recorded   Estimated Daily Nutrient Needs:  Energy (kcal/day): 5509-4052 (Kcal/kg(20-25), Weight Used: Current(83.9))  Protein (g/day):  (20% kcals) Weight Used: (Current)  Fluid (ml/day):   (1 ml/kcal)    Nutrition Diagnosis:   · Predicted inadequate energy intake related to increased demand for energy/nutrients(COVID +) as evidenced by (intake without nutrtional supplement meeting % of needs)    Nutrition Interventions:   Food and/or Nutrient Delivery: Continue current diet, Continue oral nutrition supplement Coordination of Nutrition Care: Continue to monitor while inpatient    Goals:   Previous Goal Met: Goal(s) achieved  Active Goal: Maintain intake >75% of needs    Nutrition Monitoring and Evaluation:      Food/Nutrient Intake Outcomes: Food and nutrient intake, Supplement intake  Physical Signs/Symptoms Outcomes: Biochemical data    Discharge Planning:     Too soon to determine    Cordelia Brooks, [address] [place] [address], [address] [place] [address]

## 2021-03-08 NOTE — PROGRESS NOTES
Problem: Falls - Risk of  Goal: *Absence of Falls  Description: Document Green Salvia Fall Risk and appropriate interventions in the flowsheet. Outcome: Progressing Towards Goal  Note: Fall Risk Interventions:  Mobility Interventions: Patient to call before getting OOB         Medication Interventions: Teach patient to arise slowly    Elimination Interventions: Call light in reach, Patient to call for help with toileting needs    History of Falls Interventions: Evaluate medications/consider consulting pharmacy         Problem: Patient Education: Go to Patient Education Activity  Goal: Patient/Family Education  Outcome: Progressing Towards Goal     Problem: Pressure Injury - Risk of  Goal: *Prevention of pressure injury  Description: Document Stephan Scale and appropriate interventions in the flowsheet.   Outcome: Progressing Towards Goal  Note: Pressure Injury Interventions:  Sensory Interventions: Assess changes in LOC    Moisture Interventions: Absorbent underpads    Activity Interventions: PT/OT evaluation    Mobility Interventions: Assess need for specialty bed    Nutrition Interventions: Document food/fluid/supplement intake                     Problem: Patient Education: Go to Patient Education Activity  Goal: Patient/Family Education  Outcome: Progressing Towards Goal     Problem: Nausea/Vomiting (Adult)  Goal: *Absence of nausea/vomiting  Outcome: Progressing Towards Goal  Goal: *Palliation of nausea/vomiting (Palliative Care)  Outcome: Progressing Towards Goal     Problem: Breathing Pattern - Ineffective  Goal: Ability to achieve and maintain a regular respiratory rate  Outcome: Progressing Towards Goal     Problem: Isolation Precautions - Risk of Spread of Infection  Goal: Prevent transmission of infectious organism to others  Outcome: Progressing Towards Goal     Problem: Nutrition Deficits  Goal: Optimize nutrtional status  Outcome: Progressing Towards Goal     Problem: Risk for Fluid Volume Deficit  Goal: Maintain normal heart rhythm  Outcome: Progressing Towards Goal  Goal: Maintain absence of muscle cramping  Outcome: Progressing Towards Goal  Goal: Maintain normal serum potassium, sodium, calcium, phosphorus, and pH  Outcome: Progressing Towards Goal     Problem: Loneliness or Risk for Loneliness  Goal: Demonstrate positive use of time alone when socialization is not possible  Outcome: Progressing Towards Goal     Problem: Fatigue  Goal: Verbalize increase energy and improved vitality  Outcome: Progressing Towards Goal     Problem: Patient Education: Go to Patient Education Activity  Goal: Patient/Family Education  Outcome: Progressing Towards Goal     Problem: Nutrition Deficit  Goal: *Optimize nutritional status  Outcome: Progressing Towards Goal

## 2021-03-08 NOTE — PROGRESS NOTES
Chart screened by CM for d/c planning. Pt currently requiring 40L O2 heated HFNC Airvo 40%. Weaning O2 as tolerated. PT and OT recommend [place] upon d/c. Referral made to [facility] given pt's LOS = 14 days and he continues to have high O2 needs. [facility] is verifying if pt's insurance has LTAC benefits. No immediate needs identified. CM will continue to follow and remain available if any needs arise.

## 2021-03-09 LAB
ANION GAP SERPL CALC-SCNC: 3 MMOL/L (ref 7–16)
BUN SERPL-MCNC: 16 MG/DL (ref 6–23)
CALCIUM SERPL-MCNC: 8.8 MG/DL (ref 8.3–10.4)
CHLORIDE SERPL-SCNC: 102 MMOL/L (ref 98–107)
CO2 SERPL-SCNC: 30 MMOL/L (ref 21–32)
CREAT SERPL-MCNC: 1.06 MG/DL (ref 0.8–1.5)
GLUCOSE SERPL-MCNC: 88 MG/DL (ref 65–100)
MAGNESIUM SERPL-MCNC: 2.4 MG/DL (ref 1.8–2.4)
POTASSIUM SERPL-SCNC: 4.3 MMOL/L (ref 3.5–5.1)
SODIUM SERPL-SCNC: 135 MMOL/L (ref 136–145)

## 2021-03-09 PROCEDURE — 74011250636 HC RX REV CODE- 250/636: Performed by: INTERNAL MEDICINE

## 2021-03-09 PROCEDURE — 74011250637 HC RX REV CODE- 250/637: Performed by: INTERNAL MEDICINE

## 2021-03-09 PROCEDURE — 94760 N-INVAS EAR/PLS OXIMETRY 1: CPT

## 2021-03-09 PROCEDURE — 94762 N-INVAS EAR/PLS OXIMTRY CONT: CPT

## 2021-03-09 PROCEDURE — 94640 AIRWAY INHALATION TREATMENT: CPT

## 2021-03-09 PROCEDURE — 36415 COLL VENOUS BLD VENIPUNCTURE: CPT

## 2021-03-09 PROCEDURE — 65270000029 HC RM PRIVATE

## 2021-03-09 PROCEDURE — 80048 BASIC METABOLIC PNL TOTAL CA: CPT

## 2021-03-09 PROCEDURE — 77010033711 HC HIGH FLOW OXYGEN

## 2021-03-09 PROCEDURE — 74011250637 HC RX REV CODE- 250/637: Performed by: HOSPITALIST

## 2021-03-09 PROCEDURE — 83735 ASSAY OF MAGNESIUM: CPT

## 2021-03-09 PROCEDURE — 74011250637 HC RX REV CODE- 250/637: Performed by: FAMILY MEDICINE

## 2021-03-09 RX ADMIN — FAMOTIDINE 20 MG: 20 TABLET ORAL at 17:11

## 2021-03-09 RX ADMIN — FUROSEMIDE 20 MG: 20 TABLET ORAL at 08:02

## 2021-03-09 RX ADMIN — LACTOBACILLUS TAB 2 TABLET: TAB at 08:02

## 2021-03-09 RX ADMIN — ENOXAPARIN SODIUM 30 MG: 100 INJECTION SUBCUTANEOUS at 23:01

## 2021-03-09 RX ADMIN — NYSTATIN 500000 UNITS: 100000 SUSPENSION ORAL at 21:12

## 2021-03-09 RX ADMIN — MONTELUKAST 10 MG: 10 TABLET, FILM COATED ORAL at 08:02

## 2021-03-09 RX ADMIN — TEMAZEPAM 15 MG: 15 CAPSULE ORAL at 21:12

## 2021-03-09 RX ADMIN — ENOXAPARIN SODIUM 30 MG: 100 INJECTION SUBCUTANEOUS at 12:30

## 2021-03-09 RX ADMIN — NYSTATIN 500000 UNITS: 100000 SUSPENSION ORAL at 12:30

## 2021-03-09 RX ADMIN — NYSTATIN 500000 UNITS: 100000 SUSPENSION ORAL at 17:11

## 2021-03-09 RX ADMIN — LACTOBACILLUS TAB 2 TABLET: TAB at 17:11

## 2021-03-09 RX ADMIN — HYOSCYAMINE SULFATE 0.25 MG: 0.12 TABLET ORAL; SUBLINGUAL at 19:43

## 2021-03-09 RX ADMIN — AMITRIPTYLINE HYDROCHLORIDE 10 MG: 10 TABLET, FILM COATED ORAL at 21:12

## 2021-03-09 RX ADMIN — LORAZEPAM 1 MG: 1 TABLET ORAL at 19:43

## 2021-03-09 RX ADMIN — ALBUTEROL SULFATE 2 PUFF: 108 INHALANT RESPIRATORY (INHALATION) at 20:31

## 2021-03-09 RX ADMIN — ALBUTEROL SULFATE 2 PUFF: 108 INHALANT RESPIRATORY (INHALATION) at 13:29

## 2021-03-09 RX ADMIN — NYSTATIN 500000 UNITS: 100000 SUSPENSION ORAL at 08:02

## 2021-03-09 RX ADMIN — ALBUTEROL SULFATE 2 PUFF: 108 INHALANT RESPIRATORY (INHALATION) at 08:51

## 2021-03-09 RX ADMIN — POTASSIUM CHLORIDE 20 MEQ: 1500 TABLET, EXTENDED RELEASE ORAL at 08:02

## 2021-03-09 RX ADMIN — FAMOTIDINE 20 MG: 20 TABLET ORAL at 08:02

## 2021-03-09 NOTE — PROGRESS NOTES
Problem: Falls - Risk of  Goal: *Absence of Falls  Description: Document Wendy Chambers Fall Risk and appropriate interventions in the flowsheet. 3/9/2021 0956 by Urbano Ayala RN  Outcome: Progressing Towards Goal  Note: Fall Risk Interventions:  Mobility Interventions: Patient to call before getting OOB         Medication Interventions: Teach patient to arise slowly    Elimination Interventions: Call light in reach, Patient to call for help with toileting needs    History of Falls Interventions: Evaluate medications/consider consulting pharmacy      3/9/2021 9543 by Urbano Ayala, RN  Outcome: Progressing Towards Goal  Note: Fall Risk Interventions:  Mobility Interventions: Patient to call before getting OOB. Laurie activity with physical therapy. Medication Interventions: Teach patient to arise slowly    Elimination Interventions: Call light in reach, Patient to call for help with toileting needs    History of Falls Interventions: Evaluate medications/consider consulting pharmacy         Problem: Breathing Pattern - Ineffective  Goal: Ability to achieve and maintain a regular respiratory rate  Outcome: Progressing Towards Goal      Flow rate decreased some this am; remains on 40%fio2.     Problem: Nutrition Deficits  Goal: Optimize nutrtional status  Outcome: Progressing Towards Goal. Appetite improved, laurie fluids     Problem: Nausea/Vomiting (Adult)  Goal: *Absence of nausea/vomiting  Outcome: Progressing Towards Goal

## 2021-03-09 NOTE — PROGRESS NOTES
Chart screened by CM for d/c planning. Pt is currently requiring 40L O2 heated HFNC Airvo 40%. Weaning O2 as tolerated. PT and OT continue to recommend [facility] upon d/c.  CM spoke with Kandy Gonzalez - liaison with [facility] - and pt's insurance does have an LTAC benefit and pt does meets criteria. [facility] will be able to complete the referral and begin precert once pt's family is able to provide proof of insurance. Kandy Gonzalez has spoke with pt's family regarding insurance. Should a bed at [facility] become available before pt's O2 is weaned enough for d/c to home with [facility] he will be d/c to LTAC. LOS = 15 days. No immediate needs identified. CM will continue to follow and remain available if any needs arise.

## 2021-03-09 NOTE — PROGRESS NOTES
Hospitalist Progress Note     Admit Date:  2021  9:06 AM   Name:  Mariama Schultz   Age:  64 y.o.  :  1960   MRN:  [id number]   PCP:  Anshu Cortez MD  Treatment Team: Attending Provider: Mau Mc DO; Care Manager: Lashonda Byrnes LMSW; Utilization Review: Bharat Aguilar; Staff Nurse: Katie Ashton, RN; Occupational Therapist: Clinton Parr, OTR/L    Summary copied from past progress note:  \"58 y.o. male with medical h/o anxiety and GERD who was seen in the ER on 2021 and diagnosed with COVID-19 pneumonia. Dawood Coker was discharged to home but per him he continued to get progressively worse and short of breath.  Per the patient he denies entire family are sick with the COVID-19. Jenifer Black did have a gathering for the Super Bowl and this is where he thinks he may have gotten infected. \" S/p convalescent plasma on . Last dose of remdesivir . S/p Ceftriaxone and doxycycline one dose. Subjective: Interval 3/9 since last visit-admitted -acute hypoxemic respiratory failure COVID-19 pneumonia. Slight decreased oxygenation requirement last 24 hours. He did have recurrent frequent semiformed stools last evening. He thought probiotic was helping him significantly. Remains dyspneic with minimal exertion on high flow/air Vo        A/P:    Principal Problem:    Acute respiratory failure due to severe acute respiratory syndrome coronavirus 2 (SARS-CoV-2) infection ([facility] [address] (2021)  -continue supportive care with oxygenation as needed.   -try fixed dose Lasix addition 20 mg daily with potassium and magnesium supplementation as needed  -improved hoiqzfkxkgb-jdfsjy-pu labs in the a.m.-Monitoring electrolytes and creatinine  -monitor electrolytes and creatinine intermittently started Lasix  seems to have helped.         Dyslipidemia (2015)       Gastroesophageal reflux disease without esophagitis (7/18/2018)       COVID-19 (2/21/2021)     Acute hypoxic respiratory failure due to COVID-19 pneumonia-  3/5:  s/p CP. S/p remdesivir. Albuterol q6hr. S/p decadron for 10 days. Added nystatin. Patient continues to be on air Vo 50 L. Currently on continuous pulse ox. Ordered for PCT in view of elevated WBC. Likely steroid-induced. March 6-monitor CBC off steroids. March 7-hopefully wean oxygen soon. March 8-wean oxygen. March 9-continue supportive care      Left renal  Mass -   3/5: No change   chronic and he states he has nephrologist who follows. March 6-emphasized to patient follow-up important. March 7-unchanged            March 7-diarrhea-now complaining of frequent pasty stools-continue PRN immodium, addition of probiotic twice daily. Discussed prebiotic with increased fiber. March 8-improved with probiotic-continue same and monitor. Likely related to COVID-19-did have course of recent antibiotics but pasty/semiformed stool  March 9-continue same probiotic and observe. Overall improved                 DVT prophylaxis - Lovenox     Objective:     Patient Vitals for the past 24 hrs:   Temp Pulse Resp BP SpO2   03/09/21 1152 98.4 °F (36.9 °C) (!) 104 18 98/77 92 %   03/09/21 0851 -- -- -- -- 97 %   03/09/21 0820 98.1 °F (36.7 °C) (!) 103 18 107/78 95 %   03/09/21 0355 98.2 °F (36.8 °C) 93 18 118/81 94 %   03/08/21 2315 98.1 °F (36.7 °C) (!) 109 20 90/67 94 %   03/08/21 2037 -- -- -- -- 96 %   03/08/21 1928 98.1 °F (36.7 °C) (!) 118 20 100/72 99 %   03/08/21 1618 97.6 °F (36.4 °C) (!) 111 18 97/76 91 %   03/08/21 1357 -- -- -- -- 96 %     Oxygen Therapy  O2 Sat (%): 92 % (03/09/21 1152)  Pulse via Oximetry: 100 beats per minute (03/08/21 2037)  O2 Device: Hi flow nasal cannula; Heated (03/09/21 1213)  Skin Assessment: Clean, dry, & intact (03/09/21 0730)  Skin Protection for O2 Device: N/A (03/08/21 0830)  O2 Flow Rate (L/min): 35 l/min(flow decreased from 40lpm to 35lpm) (03/09/21 0851)  O2 Temperature: 87.8 °F (31 °C) (03/08/21 2037)  FIO2 (%): 40 % (03/09/21 0851)    Intake/Output Summary (Last 24 hours) at 3/9/2021 1253  Last data filed at 3/9/2021 1157  Gross per 24 hour   Intake --   Output 1000 ml   Net -1000 ml         REVIEW OF SYSTEMS: Comprehensive ROS performed and negative except as stated in HPI. Physical Examination:  Physical Exam  Vitals signs and nursing note reviewed. Constitutional:       General: He is not in acute distress. Appearance: He is not toxic-appearing. HENT:      Head: Normocephalic and atraumatic. Nose: No congestion or rhinorrhea. Mouth/Throat:      Pharynx: No oropharyngeal exudate or posterior oropharyngeal erythema. Eyes:      General: No scleral icterus. Conjunctiva/sclera: Conjunctivae normal.      Pupils: Pupils are equal, round, and reactive to light. Neck:      Musculoskeletal: No neck rigidity. Cardiovascular:      Rate and Rhythm: Normal rate and regular rhythm. Heart sounds: No gallop. Pulmonary:      Breath sounds: No stridor. No wheezing. Abdominal:      General: There is no distension. Tenderness: There is no guarding. Musculoskeletal:         General: No deformity or signs of injury. Lymphadenopathy:      Cervical: No cervical adenopathy. Skin:     Capillary Refill: Capillary refill takes less than 2 seconds. Coloration: Skin is not jaundiced. Findings: No bruising. Neurological:      General: No focal deficit present. Mental Status: He is alert and oriented to person, place, and time. Psychiatric:         Behavior: Behavior normal.           Data Review:  I have reviewed all labs, meds, telemetry events, and studies from the last 24 hours.     Recent Results (from the past 24 hour(s))   MAGNESIUM    Collection Time: 03/09/21  7:22 AM   Result Value Ref Range    Magnesium 2.4 1.8 - 2.4 mg/dL   METABOLIC PANEL, BASIC    Collection Time: 03/09/21  7:22 AM   Result Value Ref Range Sodium 135 (L) 136 - 145 mmol/L    Potassium 4.3 3.5 - 5.1 mmol/L    Chloride 102 98 - 107 mmol/L    CO2 30 21 - 32 mmol/L    Anion gap 3 (L) 7 - 16 mmol/L    Glucose 88 65 - 100 mg/dL    BUN 16 6 - 23 MG/DL    Creatinine 1.06 0.8 - 1.5 MG/DL    GFR est AA >60 >60 ml/min/1.73m2    GFR est non-AA >60 >60 ml/min/1.73m2    Calcium 8.8 8.3 - 10.4 MG/DL        All Micro Results     Procedure Component Value Units Date/Time    CULTURE, BLOOD [[id number]] Collected: 02/21/21 0921    Order Status: Completed Specimen: Blood Updated: 02/26/21 0803     Special Requests: --        LEFT  Antecubital       Culture result: NO GROWTH 5 DAYS       CULTURE, BLOOD [[id number]] Collected: 02/21/21 0930    Order Status: Completed Specimen: Blood Updated: 02/26/21 0803     Special Requests: --        RIGHT  FOREARM       Culture result: NO GROWTH 5 DAYS             Current Meds:  Current Facility-Administered Medications   Medication Dose Route Frequency    Lactobacillus Acidoph & Bulgar (FLORANEX) tablet 2 Tab  2 Tab Oral BID    furosemide (LASIX) tablet 20 mg  20 mg Oral DAILY    potassium chloride (K-DUR, KLOR-CON) SR tablet 20 mEq  20 mEq Oral DAILY    famotidine (PEPCID) tablet 20 mg  20 mg Oral BID    bismuth subsalicylate (PEPTO-BISMOL) oral suspension 30 mL  30 mL Oral Q6H PRN    temazepam (RESTORIL) capsule 15 mg  15 mg Oral QHS PRN    albuterol (PROVENTIL HFA, VENTOLIN HFA, PROAIR HFA) inhaler 2 Puff  2 Puff Inhalation TID RT    phenol throat spray (CHLORASEPTIC) 1 Spray  1 Spray Oral PRN    nystatin (MYCOSTATIN) 100,000 unit/mL oral suspension 500,000 Units  500,000 Units Oral QID    loperamide (IMODIUM) capsule 2 mg  2 mg Oral Q4H PRN    LORazepam (ATIVAN) tablet 1 mg  1 mg Oral Q6H PRN    0.9% sodium chloride infusion 250 mL  250 mL IntraVENous PRN    amitriptyline (ELAVIL) tablet 10 mg  10 mg Oral QHS    benzonatate (TESSALON) capsule 100 mg  100 mg Oral TID PRN    guaiFENesin-codeine (ROBITUSSIN AC) 100-10 mg/5 mL solution 5 mL  5 mL Oral TID PRN    montelukast (SINGULAIR) tablet 10 mg  10 mg Oral DAILY    tiZANidine (ZANAFLEX) tablet 4 mg  4 mg Oral TID PRN    hyoscyamine SL (LEVSIN/SL) tablet 0.25 mg  0.25 mg SubLINGual Q4H PRN    enoxaparin (LOVENOX) injection 30 mg  30 mg SubCUTAneous Q12H    acetaminophen (TYLENOL) tablet 650 mg  650 mg Oral Q6H PRN    Or    acetaminophen (TYLENOL) suppository 650 mg  650 mg Rectal Q6H PRN    guaiFENesin (ROBITUSSIN) 100 mg/5 mL oral liquid 200 mg  200 mg Oral Q4H PRN    ondansetron (ZOFRAN) injection 4 mg  4 mg IntraVENous Q6H PRN       Diet:  DIET GI SOFT  DIET NUTRITIONAL SUPPLEMENTS    Other Studies (last 24 hours):  No results found. Assessment and Plan:     Hospital Problems as of 3/9/2021 Date Reviewed: 1/20/2021          Codes Class Noted - Resolved POA    * (Principal) Acute respiratory failure due to severe acute respiratory syndrome coronavirus 2 (SARS-CoV-2) infection (HCC) ICD-10-CM: U07.1, J96.00  ICD-9-CM: 518.81, 079.89  2/21/2021 - Present         COVID-19 ICD-10-CM: U07.1  ICD-9-CM: 079.89  2/21/2021 - Present Unknown        Gastroesophageal reflux disease without esophagitis ICD-10-CM: K21.9  ICD-9-CM: 530.81  7/18/2018 - Present Yes        Dyslipidemia ICD-10-CM: E78.5  ICD-9-CM: 272.4  9/2/2015 - Present Yes                    Signed:  Roger CHÁVEZ

## 2021-03-10 PROCEDURE — 74011250637 HC RX REV CODE- 250/637: Performed by: INTERNAL MEDICINE

## 2021-03-10 PROCEDURE — 97530 THERAPEUTIC ACTIVITIES: CPT

## 2021-03-10 PROCEDURE — 74011250636 HC RX REV CODE- 250/636: Performed by: INTERNAL MEDICINE

## 2021-03-10 PROCEDURE — 97535 SELF CARE MNGMENT TRAINING: CPT

## 2021-03-10 PROCEDURE — 97164 PT RE-EVAL EST PLAN CARE: CPT

## 2021-03-10 PROCEDURE — 74011250637 HC RX REV CODE- 250/637: Performed by: FAMILY MEDICINE

## 2021-03-10 PROCEDURE — 77010033678 HC OXYGEN DAILY

## 2021-03-10 PROCEDURE — 94640 AIRWAY INHALATION TREATMENT: CPT

## 2021-03-10 PROCEDURE — 74011250637 HC RX REV CODE- 250/637: Performed by: HOSPITALIST

## 2021-03-10 PROCEDURE — 65270000029 HC RM PRIVATE

## 2021-03-10 PROCEDURE — 94762 N-INVAS EAR/PLS OXIMTRY CONT: CPT

## 2021-03-10 RX ORDER — GUAIFENESIN 600 MG/1
1200 TABLET, EXTENDED RELEASE ORAL 2 TIMES DAILY
Status: DISCONTINUED | OUTPATIENT
Start: 2021-03-10 | End: 2021-03-12 | Stop reason: HOSPADM

## 2021-03-10 RX ADMIN — FAMOTIDINE 20 MG: 20 TABLET ORAL at 17:26

## 2021-03-10 RX ADMIN — GUAIFENESIN 1200 MG: 600 TABLET ORAL at 13:52

## 2021-03-10 RX ADMIN — NYSTATIN 500000 UNITS: 100000 SUSPENSION ORAL at 21:53

## 2021-03-10 RX ADMIN — MONTELUKAST 10 MG: 10 TABLET, FILM COATED ORAL at 08:21

## 2021-03-10 RX ADMIN — TEMAZEPAM 15 MG: 15 CAPSULE ORAL at 21:53

## 2021-03-10 RX ADMIN — HYOSCYAMINE SULFATE 0.25 MG: 0.12 TABLET ORAL; SUBLINGUAL at 21:53

## 2021-03-10 RX ADMIN — ACETAMINOPHEN 650 MG: 325 TABLET, FILM COATED ORAL at 09:03

## 2021-03-10 RX ADMIN — ALBUTEROL SULFATE 2 PUFF: 108 INHALANT RESPIRATORY (INHALATION) at 08:11

## 2021-03-10 RX ADMIN — AMITRIPTYLINE HYDROCHLORIDE 10 MG: 10 TABLET, FILM COATED ORAL at 21:53

## 2021-03-10 RX ADMIN — LACTOBACILLUS TAB 2 TABLET: TAB at 17:26

## 2021-03-10 RX ADMIN — NYSTATIN 500000 UNITS: 100000 SUSPENSION ORAL at 08:20

## 2021-03-10 RX ADMIN — ALBUTEROL SULFATE 2 PUFF: 108 INHALANT RESPIRATORY (INHALATION) at 13:58

## 2021-03-10 RX ADMIN — POTASSIUM CHLORIDE 20 MEQ: 1500 TABLET, EXTENDED RELEASE ORAL at 08:20

## 2021-03-10 RX ADMIN — ALBUTEROL SULFATE 2 PUFF: 108 INHALANT RESPIRATORY (INHALATION) at 20:00

## 2021-03-10 RX ADMIN — FAMOTIDINE 20 MG: 20 TABLET ORAL at 08:21

## 2021-03-10 RX ADMIN — GUAIFENESIN 1200 MG: 600 TABLET ORAL at 17:26

## 2021-03-10 RX ADMIN — NYSTATIN 500000 UNITS: 100000 SUSPENSION ORAL at 17:26

## 2021-03-10 RX ADMIN — LACTOBACILLUS TAB 2 TABLET: TAB at 08:20

## 2021-03-10 RX ADMIN — ENOXAPARIN SODIUM 30 MG: 100 INJECTION SUBCUTANEOUS at 12:10

## 2021-03-10 NOTE — PROGRESS NOTES
's visit via telephone call attempted. The call was unanswered. Chaplains remain available follow-up.      Kenneth Goel MDIV, [facility]

## 2021-03-10 NOTE — PROGRESS NOTES
Hospitalist Progress Note     Admit Date:  2021  9:06 AM   Name:  Erika Solitario   Age:  64 y.o.  :  1960   MRN:  [id number]   PCP:  Candance Lesches., MD  Treatment Team: Attending Provider: Lilian Collet, MD; Care Manager: Alonso Quevedo LM; Utilization Review: Jonathan Jordan; Staff Nurse: Kamila Diego RN; Physical Therapist: Francoise Henson, PT, DPT; Occupational Therapist: Dannie Santos OTR/L  Presenting Complaint: Shortness of Breath and Concern For COVID-19 (Coronavirus)      Hospital Course:   64 y.o. male with medical h/o anxiety and GERD who was seen in the ER on 2021 and diagnosed with COVID-19 pneumonia. [facility] was discharged to home but per him he continued to get progressively worse and short of breath.  Per the patient he denies entire family are sick with the COVID-19. Nat Mendosa did have a gathering for the Super Bowl and this is where he thinks he may have gotten infected. \" S/p convalescent plasma on . Last dose of remdesivir . S/p Ceftriaxone and doxycycline one dose. Slow to wean off oxygen was reason for extended stay. 24hr Events/Subjective:   Pt feeling better. Less SOB. Finally down to 5L NC, off airvo. Some cough persists, having trouble expectorating; mucinex added. No CP, fevers.     No other complaints  Assessment and Plan:     Hospital Problems as of 3/10/2021 Date Reviewed: 2021          Codes Class Noted - Resolved POA    * (Principal) Acute respiratory failure due to severe acute respiratory syndrome coronavirus 2 (SARS-CoV-2) infection (HCC) ICD-10-CM: U07.1, J96.00  ICD-9-CM: 518.81, 079.89  2021 - Present         COVID-19 ICD-10-CM: U07.1  ICD-9-CM: 079.89  2021 - Present Unknown        Gastroesophageal reflux disease without esophagitis ICD-10-CM: K21.9  ICD-9-CM: 530.81  2018 - Present Yes        Dyslipidemia ICD-10-CM: E78.5  ICD-9-CM: 272.4  2015 - Present Yes              Plan:   Acute respiratory failure due to severe acute respiratory syndrome coronavirus 2   -has completed available therapies  -mucinex  -albuterol  -cont weaning oxygen as able. -stop lasix and KCL. Does not take at home and does not appear overloaded. BP borderline. Cr slightly above baseline    DC planning:    -to LTAC vs home with oxygen. Maybe tomorrow if o2 weaned more    Other listed chronic conditions stable, continue current management. Diet:  DIET GI SOFT  DIET NUTRITIONAL SUPPLEMENTS  DVT ppx:  lovenox    Objective:     Patient Vitals for the past 24 hrs:   Temp Pulse Resp BP SpO2   03/10/21 1208 97.9 °F (36.6 °C) 95 16 112/83 92 %   03/10/21 0811 -- -- -- -- 97 %   03/10/21 0733 98.8 °F (37.1 °C) (!) 110 17 92/77 97 %   03/10/21 0345 99.5 °F (37.5 °C) 99 18 97/74 98 %   03/09/21 2318 99 °F (37.2 °C) 100 18 91/80 97 %   03/09/21 2032 -- -- -- -- 94 %   03/09/21 1924 97.9 °F (36.6 °C) 100 18 107/77 98 %   03/09/21 1704 -- 98 -- 96/70 --   03/09/21 1542 98.8 °F (37.1 °C) 60 18 (!) 85/61 96 %   03/09/21 1329 -- -- -- -- 97 %     Oxygen Therapy  O2 Sat (%): 92 % (03/10/21 1208)  Pulse via Oximetry: 107 beats per minute (03/10/21 0811)  O2 Device: Hi flow nasal cannula;Humidifier (03/10/21 0811)  Skin Assessment: Clean, dry, & intact (03/09/21 2032)  Skin Protection for O2 Device: N/A (03/08/21 0830)  O2 Flow Rate (L/min): 5 l/min (03/10/21 0811)  O2 Temperature: 87.8 °F (31 °C) (03/09/21 2032)  FIO2 (%): 40 % (03/09/21 2032)    Estimated body mass index is 28.13 kg/m² as calculated from the following:    Height as of this encounter: 5' 8\" (1.727 m). Weight as of this encounter: 83.9 kg (185 lb). Intake/Output Summary (Last 24 hours) at 3/10/2021 1258  Last data filed at 3/10/2021 0750  Gross per 24 hour   Intake 840 ml   Output 1275 ml   Net -435 ml       *Note that automatically entered I/Os may not be accurate; dependent on patient compliance with collection and accurate  by techs.     General: Well nourished. No overt distress  CV:   RRR. No edema. No JVD  Lungs:   Even, Unlabored  Abdomen:   nondistended. Extremities: Warm and dry. No cyanosis   Skin:     No rashes. Normal coloration  Neuro:  No gross focal deficits. Alert    Data Reviewed:  I have reviewed all labs, meds, and studies from the last 24 hours:  No results found for this or any previous visit (from the past 24 hour(s)).     Current Meds:  Current Facility-Administered Medications   Medication Dose Route Frequency    guaiFENesin ER (MUCINEX) tablet 1,200 mg  1,200 mg Oral BID    Lactobacillus Acidoph & Bulgar (FLORANEX) tablet 2 Tab  2 Tab Oral BID    [Held by provider] furosemide (LASIX) tablet 20 mg  20 mg Oral DAILY    [Held by provider] potassium chloride (K-DUR, KLOR-CON) SR tablet 20 mEq  20 mEq Oral DAILY    famotidine (PEPCID) tablet 20 mg  20 mg Oral BID    bismuth subsalicylate (PEPTO-BISMOL) oral suspension 30 mL  30 mL Oral Q6H PRN    temazepam (RESTORIL) capsule 15 mg  15 mg Oral QHS PRN    albuterol (PROVENTIL HFA, VENTOLIN HFA, PROAIR HFA) inhaler 2 Puff  2 Puff Inhalation TID RT    phenol throat spray (CHLORASEPTIC) 1 Spray  1 Spray Oral PRN    nystatin (MYCOSTATIN) 100,000 unit/mL oral suspension 500,000 Units  500,000 Units Oral QID    loperamide (IMODIUM) capsule 2 mg  2 mg Oral Q4H PRN    LORazepam (ATIVAN) tablet 1 mg  1 mg Oral Q6H PRN    0.9% sodium chloride infusion 250 mL  250 mL IntraVENous PRN    amitriptyline (ELAVIL) tablet 10 mg  10 mg Oral QHS    benzonatate (TESSALON) capsule 100 mg  100 mg Oral TID PRN    guaiFENesin-codeine (ROBITUSSIN AC) 100-10 mg/5 mL solution 5 mL  5 mL Oral TID PRN    montelukast (SINGULAIR) tablet 10 mg  10 mg Oral DAILY    tiZANidine (ZANAFLEX) tablet 4 mg  4 mg Oral TID PRN    hyoscyamine SL (LEVSIN/SL) tablet 0.25 mg  0.25 mg SubLINGual Q4H PRN    enoxaparin (LOVENOX) injection 30 mg  30 mg SubCUTAneous Q12H    acetaminophen (TYLENOL) tablet 650 mg 650 mg Oral Q6H PRN    Or    acetaminophen (TYLENOL) suppository 650 mg  650 mg Rectal Q6H PRN    guaiFENesin (ROBITUSSIN) 100 mg/5 mL oral liquid 200 mg  200 mg Oral Q4H PRN    ondansetron (ZOFRAN) injection 4 mg  4 mg IntraVENous Q6H PRN       Other Studies:  No results found for this visit on 02/21/21. No results found.     All Micro Results     Procedure Component Value Units Date/Time    CULTURE, BLOOD [[id number]] Collected: 02/21/21 0921    Order Status: Completed Specimen: Blood Updated: 02/26/21 0803     Special Requests: --        LEFT  Antecubital       Culture result: NO GROWTH 5 DAYS       CULTURE, BLOOD [[id number]] Collected: 02/21/21 0930    Order Status: Completed Specimen: Blood Updated: 02/26/21 0803     Special Requests: --        RIGHT  FOREARM       Culture result: NO GROWTH 5 DAYS             SARS-CoV-2 Lab Results  \"Novel Coronavirus\" Test: No results found for: COV2NT   \"Emergent Disease\" Test: No results found for: EDPR  \"SARS-COV-2\" Test: No results found for: XGCOVT  Rapid Test: No results found for: COVR         Signed:  Diann Chicas MD

## 2021-03-10 NOTE — PROGRESS NOTES
ACUTE OT GOALS:  (Developed with and agreed upon by patient and/or caregiver.)  1) Patient will complete lower body bathing and dressing with stand by assistance and adaptive equipment as needed. GOAL MET   2) Patient will complete toileting with stand by assistance. GOAL MET  3) Patient will complete functional transfers with stand by assistance and adaptive equipment as needed. GOAL MET   4) Patient will tolerate at least 25 minutes of OT activity with 1-2 rest breaks while maintaining O2 sats >90%. GOAL MET  5) Patient will verbalize at least 3 energy conservation technique to utilize during ADL/IADL. Timeframe: 7 visits     OCCUPATIONAL THERAPY: Daily Note OT Treatment Day # 4    Poppy Truong is a 64 y.o. male   PRIMARY DIAGNOSIS: Acute respiratory failure due to severe acute respiratory syndrome coronavirus 2 (SARS-CoV-2) infection ([facility])  COVID-19 [U07.1]  Respiratory failure with hypoxia ([facility] [address] [J96.91]       Payor: [employer] / Plan: [place] [employer] / Product Type: SERENA /   ASSESSMENT:     REHAB RECOMMENDATIONS: CURRENT LEVEL OF FUNCTION:  (Most Recently Demonstrated)   Recommendation to date pending progress:  [address] [place] [address] Therapy  Equipment:    To Be Determined Bathing:   Modified Independent  Dressing:   Supervision  Feeding/Grooming:   Modified Independent  Toileting:   Not tested  Functional Mobility:   Supervision     ASSESSMENT:  Mr. Candelario Muñoz has made big progress: now weaned to 5L O2 and able to maintain sats >90% with 30 minutes of activity, completed ADL from seated position with mod I, and is mobile in room with supervision and no assistive device. Several goals met today, overall doing so much better! Great job! SUBJECTIVE:   Mr. Candelario Muñoz states, \"I don't think I can take another week in this place. I'm ready to go home. \"    SOCIAL HISTORY/LIVING ENVIRONMENT:  Pt lives with family in a 2-story home but lives on 1st level.  Pt has tub shower without shower chair and no AD at home. Pt works, drives and has family to assist if needed.    Home Environment: Private residence  One/Two Story Residence: Two story  Living Alone: No  Support Systems: Family member(s)    OBJECTIVE:     PAIN: VITAL SIGNS: LINES/DRAINS:   Pre Treatment: Pain Screen  Pain Scale 1: Numeric (0 - 10)  Pain Intensity 1: 00  Post Treatment: 0 Vital Signs  O2 Sat (%): 96 %  O2 Device: Nasal cannula  O2 Flow Rate (L/min): 5 l/min IV  O2 Device: Nasal cannula     ACTIVITIES OF DAILY LIVING: I Mod I S SBA CGA Min Mod Max Total NT Comments   BASIC ADLs:              Bathing/ Showering [] [x] [] [x] [] [] [] [] [] [] Seated in chair @ sink    Toileting [] [] [] [] [] [] [] [] [] [x]     Dressing [] [] [x] [] [] [] [] [] [] [] Upper body, lower body    Feeding [] [] [] [] [] [] [] [] [] [x]    Grooming [] [x] [] [] [] [] [] [] [] [] Washing face, brushing teeth, combing hair all from seated position @ sink    Personal Device Care [x] [] [] [] [] [] [] [] [] [] laptop   Functional Mobility [] [] [x] [] [] [] [] [] [] [] Laps in room around bed    I=Independent, Mod I=Modified Independent, S=Supervision, SBA=Standby Assistance, CGA=Contact Guard Assistance,   Min=Minimal Assistance, Mod=Moderate Assistance, Max=Maximal Assistance, Total=Total Assistance, NT=Not Tested    MOBILITY: I Mod I S SBA CGA Min Mod Max Total  NT x2 Comments:   Supine to sit [] [] [] [] [] [] [] [] [] [x] []    Sit to supine [] [] [] [] [] [] [] [] [] [x] []    Sit to stand [] [] [x] [] [] [] [] [] [] [] []    Bed to chair [] [] [x] [] [] [] [] [] [] [] []    I=Independent, Mod I=Modified Independent, S=Supervision, SBA=Standby Assistance, CGA=Contact Guard Assistance,   Min=Minimal Assistance, Mod=Moderate Assistance, Max=Maximal Assistance, Total=Total Assistance, NT=Not Tested    PLAN:   FREQUENCY/DURATION: OT Plan of Care: 3 times/week for duration of hospital stay or until stated goals are met, whichever comes first.    TREATMENT:   TREATMENT:   ($$ Self Care/Home Management: 8-22 mins$$ Therapeutic Activity: 8-22 mins   )  Self Care (31 Minutes): Self care including Upper Body Bathing, Lower Body Bathing, Toileting, Upper Body Dressing, Lower Body Dressing, Personal Device Care and Grooming to increase independence and activity tolerance. Energy conservation education provided for ADLs/IADLs.      AFTER TREATMENT POSITION/PRECAUTIONS:  Chair, Needs within reach and RN notified    INTERDISCIPLINARY COLLABORATION:  RN/PCT and OT/NIELSEN    TOTAL TREATMENT DURATION:  OT Patient Time In/Time Out  Time In: 1411  Time Out: [place] 450, OTR/L

## 2021-03-10 NOTE — PROGRESS NOTES
Pt is able to ambulate to beside commode to void. Pt c/o a headache of 4 out of 10. Tylenol was given. When reassessed pt stated a pain score of 2. Patient is tolerating 5L HFNC.

## 2021-03-10 NOTE — PROGRESS NOTES
ACUTE PHYSICAL THERAPY GOALS:  (Developed with and agreed upon by patient and/or caregiver. )    LTG:  (1.)Mr. Chato Breaux will move from supine to sit and sit to supine , scoot up and down, and roll side to side in bed with INDEPENDENT within 7 treatment day(s). Met 3/8/21  (2.)Mr. Chato Breaux will transfer from bed to chair and chair to bed with SUPERVISION using the least restrictive device within 7 treatment day(s). Met 3/10/2021   (3.)Mr. Chato Breaux will ambulate with SUPERVISION for 100+ feet with the least restrictive device within 7 treatment day(s). Met 3/10/2021   (4.)Mr. Chato Breaux will perform sit-stand x5  INDEPENDENT without using UEs under 18 seconds within 7 treatment day(s). Met 3/10/2021     Updated Goals 3/10/2021   LTG:  (1.)Mr. Chato Breaux will ambulate with modified INDEPENDENCE for 300+ feet with no device with SpO2 >88% within 7 day(s). (2.)Mr. Chato Breaux will perform standing static and dynamic balance activities x 15 minutes with SUPERVISION to improve safety within 7 day(s). (3.)Mr. Chato Breaux will ascend and descend 4 stairs (if off droplet precautions) using one hand rail(s) with modified independence to improve functional mobility and safety within 7 day(s).      ________________________________________________________________________________________________          PHYSICAL THERAPY ASSESSMENT: Daily Note and Re-evaluation PT Treatment Day # 1      Toni Puentes is a 64 y.o. male   PRIMARY DIAGNOSIS: Acute respiratory failure due to severe acute respiratory syndrome coronavirus 2 (SARS-CoV-2) infection ([facility]  COVID-19 [U07.1]  Respiratory failure with hypoxia ([facility] [J96.91]       Reason for Referral:    ICD-10: Treatment Diagnosis: Generalized Muscle Weakness (M62.81)  Difficulty in walking, Not elsewhere classified (R26.2)  INPATIENT: Payor: [employer] / Plan: [place] [employer] / Product Type: SERENA /     ASSESSMENT:     REHAB RECOMMENDATIONS:   Recommendation to date pending progress:  [facility] Therapy  Equipment:    None     PRIOR LEVEL OF FUNCTION:  (Prior to Hospitalization) INITIAL/CURRENT LEVEL OF FUNCTION:  (Most Recently Demonstrated)   Bed Mobility:   Independent  Sit to Stand:   Independent  Transfers:   Independent  Gait/Mobility:   Independent Bed Mobility:   Independent  Sit to Stand:   Independent  Transfers:   Modified Independent  Gait/Mobility:   Modified Independent     ASSESSMENT:  Mr. Edison Manuel prone in bed on 5L nasal cannula!! He has progressed with transfers/gait and met all goals. His gait is very slow and unsteady with short, cautious steps. Standing balance fair. . Encouraged patient to ambulate into restroom instead of using BSC to increase activity tolerance. Pt will benefit from continued skilled and sophisticated PT to help improve impairments. SUBJECTIVE:   Mr. Edison Manuel states, \"I have 2 grandchildren and 3 on the way. \"    SOCIAL HISTORY/LIVING ENVIRONMENT: IND with all ADLs, no AD, drives, works for [employer] with lots of standing, moving around. Desires to return when cleared.    Home Environment: Private residence  One/Two Story Residence: Two story  Living Alone: No  Support Systems: Family member(s)  OBJECTIVE:     PAIN: VITAL SIGNS: LINES/DRAINS:   Pre Treatment: Pain Screen  Pain Scale 1: Numeric (0 - 10)  Pain Intensity 1: 2  Post Treatment: 0   continuous O2 sat monitor  O2 Device: Hi flow nasal cannula, Humidifier     GROSS EVALUATION:   Within Functional Limits Abnormal/ Functional Abnormal/ Non-Functional (see comments) Not Tested Comments:   AROM [x] [] [] []    PROM [] [] [] []    Strength [x] [] [] []    Balance [] [x] [] [] Standing balance fair   Posture [x] [] [] []    Sensation [x] [] [] []    Coordination [x] [] [] []    Tone [] [] [] []    Edema [] [] [] []    Activity Tolerance [x] [] [] []     [] [] [] []      COGNITION/  PERCEPTION: Intact Impaired   (see comments) Comments:   Orientation [x] []    Vision [x] []    Hearing [x] []    Command Following [x] []    Safety Awareness [x] []     [] []      MOBILITY: I Mod I S SBA CGA Min Mod Max Total  NT x2 Comments:   Bed Mobility    Rolling [x] [] [] [] [] [] [] [] [] [] []    Supine to Sit [x] [] [] [] [] [] [] [] [] [] []    Scooting [x] [] [] [] [] [] [] [] [] [] []    Sit to Supine [x] [] [] [] [] [] [] [] [] [] []    Transfers    Sit to Stand [x] [] [] [] [] [] [] [] [] [] []    Bed to Chair [] [x] [] [] [] [] [] [] [] [] []    Stand to Sit [x] [] [] [] [] [] [] [] [] [] []    I=Independent, Mod I=Modified Independent, S=Supervision, SBA=Standby Assistance, CGA=Contact Guard Assistance,   Min=Minimal Assistance, Mod=Moderate Assistance, Max=Maximal Assistance, Total=Total Assistance, NT=Not Tested  GAIT: I Mod I S SBA CGA Min Mod Max Total  NT x2 Comments:   Level of Assistance [] [x] [] [] [] [] [] [] [] [] []    Distance 100'    DME holding furniture at times    Gait Quality Slow, short steps, wide DARION    Weightbearing Status N/A     I=Independent, Mod I=Modified Independent, S=Supervision, SBA=Standby Assistance, CGA=Contact Guard Assistance,   Min=Minimal Assistance, Mod=Moderate Assistance, Max=Maximal Assistance, Total=Total Assistance, NT=Not Tested    [address] [facility] [address] [address] [facility] Form       How much difficulty does the patient currently have. .. Unable A Lot A Little None   1. Turning over in bed (including adjusting bedclothes, sheets and blankets)? [] 1   [] 2   [] 3   [x] 4   2. Sitting down on and standing up from a chair with arms ( e.g., wheelchair, bedside commode, etc.)   [] 1   [] 2   [] 3   [x] 4   3. Moving from lying on back to sitting on the side of the bed? [] 1   [] 2   [] 3   [x] 4   How much help from another person does the patient currently need. .. Total A Lot A Little None   4. Moving to and from a bed to a chair (including a wheelchair)? [] 1   [] 2   [] 3   [x] 4   5.   Need to walk in hospital room? [] 1   [] 2   [x] 3   [] 4   6. Climbing 3-5 steps with a railing? [] 1   [] 2   [x] 3   [] 4   © 2007, Trustees of [facility] [employer], under license to [employer]. All rights reserved     Score:  Initial: 18 Most Recent:  22(Date: 3/10/21)    Interpretation of Tool:  Represents activities that are increasingly more difficult (i.e. Bed mobility, Transfers, Gait). PLAN:   FREQUENCY/DURATION: PT Plan of Care: 3 times/week for duration of hospital stay or until stated goals are met, whichever comes first.    PROBLEM LIST:   (Skilled intervention is medically necessary to address:)  1. Decreased ADL/Functional Activities  2. Decreased Activity Tolerance  3. Decreased AROM/PROM  4. Decreased Balance  5. Decreased Cognition  6. Decreased Coordination  7. Decreased Gait Ability  8. Decreased Strength  9. Decreased Transfer Abilities  10. Increased Pain   INTERVENTIONS PLANNED:   (Benefits and precautions of physical therapy have been discussed with the patient.)  1. Therapeutic Activity  2. Therapeutic Exercise/HEP  3. Neuromuscular Re-education  4. Gait Training  5. Education     TREATMENT:     EVALUATION: High Complexity : (Untimed Charge)    TREATMENT:   ($$ Therapeutic Activity: 23-37 mins    )  Therapeutic Activity (25 Minutes): Therapeutic activity included Rolling, Supine to Sit, Sit to Supine, Scooting, Lateral Scooting, Transfer Training, Ambulation on level ground, Sitting balance , Standing balance and standing ex's to improve functional Mobility, Strength and Activity tolerance. Worked on retrogait in room.     TREATMENT GRID:  N/A     Date: 3/01/21 3/02/21 03/04/21 3/5/21 3/8/21  3/10/21   Ambulation  Device  assistance               Partial Squats       x20 AB       Hip Abduction/ Adduction Standing x10 AB       x10 AB  2x10 AB   Heel Raises  Standing x10 AB x10 AB x10 AB x20 AB 2x10 AB  x20 AB   Toe Raises Standing x10 AB   x10 AB x20 AB 2x10 AB  x20 AB   Hip Flexion Standing x10 AB   x10 AB x20AB 2x10 AB  x20 AB   Sit to Stand 4x             Long arc quads 2x10 AB             Heel cord stretch   x2 Dolores           Ankle pumps x10 AB             Long arc quads   2x10 AB           Hip flexion in sit   2x10 AB           Hip ab/ad sit   2x10 AB                 AFTER TREATMENT POSITION/PRECAUTIONS:  Chair, Needs within reach and RN notified    INTERDISCIPLINARY COLLABORATION:  RN/PCT and PT/PTA    TOTAL TREATMENT DURATION:  PT Patient Time In/Time Out  Time In: 1139  Time Out: 1214    A Dulce Maria Razo, PT, DPT

## 2021-03-11 PROCEDURE — 74011250637 HC RX REV CODE- 250/637: Performed by: INTERNAL MEDICINE

## 2021-03-11 PROCEDURE — 74011250636 HC RX REV CODE- 250/636: Performed by: INTERNAL MEDICINE

## 2021-03-11 PROCEDURE — 74011250637 HC RX REV CODE- 250/637: Performed by: FAMILY MEDICINE

## 2021-03-11 PROCEDURE — 94762 N-INVAS EAR/PLS OXIMTRY CONT: CPT

## 2021-03-11 PROCEDURE — 94761 N-INVAS EAR/PLS OXIMETRY MLT: CPT

## 2021-03-11 PROCEDURE — 77010033678 HC OXYGEN DAILY

## 2021-03-11 PROCEDURE — 65270000029 HC RM PRIVATE

## 2021-03-11 PROCEDURE — 97530 THERAPEUTIC ACTIVITIES: CPT

## 2021-03-11 PROCEDURE — 94640 AIRWAY INHALATION TREATMENT: CPT

## 2021-03-11 RX ORDER — MAG HYDROX/ALUMINUM HYD/SIMETH 200-200-20
30 SUSPENSION, ORAL (FINAL DOSE FORM) ORAL
Status: DISCONTINUED | OUTPATIENT
Start: 2021-03-11 | End: 2021-03-12 | Stop reason: HOSPADM

## 2021-03-11 RX ADMIN — NYSTATIN 500000 UNITS: 100000 SUSPENSION ORAL at 08:59

## 2021-03-11 RX ADMIN — GUAIFENESIN 1200 MG: 600 TABLET ORAL at 08:59

## 2021-03-11 RX ADMIN — FAMOTIDINE 20 MG: 20 TABLET ORAL at 08:59

## 2021-03-11 RX ADMIN — ENOXAPARIN SODIUM 30 MG: 100 INJECTION SUBCUTANEOUS at 23:11

## 2021-03-11 RX ADMIN — FAMOTIDINE 20 MG: 20 TABLET ORAL at 17:35

## 2021-03-11 RX ADMIN — ALBUTEROL SULFATE 2 PUFF: 108 INHALANT RESPIRATORY (INHALATION) at 13:58

## 2021-03-11 RX ADMIN — ENOXAPARIN SODIUM 30 MG: 100 INJECTION SUBCUTANEOUS at 12:41

## 2021-03-11 RX ADMIN — MONTELUKAST 10 MG: 10 TABLET, FILM COATED ORAL at 08:59

## 2021-03-11 RX ADMIN — LORAZEPAM 1 MG: 1 TABLET ORAL at 23:51

## 2021-03-11 RX ADMIN — LORAZEPAM 1 MG: 1 TABLET ORAL at 17:35

## 2021-03-11 RX ADMIN — ALBUTEROL SULFATE 2 PUFF: 108 INHALANT RESPIRATORY (INHALATION) at 20:00

## 2021-03-11 RX ADMIN — HYOSCYAMINE SULFATE 0.25 MG: 0.12 TABLET ORAL; SUBLINGUAL at 21:24

## 2021-03-11 RX ADMIN — ACETAMINOPHEN 650 MG: 325 TABLET, FILM COATED ORAL at 21:24

## 2021-03-11 RX ADMIN — LACTOBACILLUS TAB 2 TABLET: TAB at 08:59

## 2021-03-11 RX ADMIN — ALBUTEROL SULFATE 2 PUFF: 108 INHALANT RESPIRATORY (INHALATION) at 07:45

## 2021-03-11 RX ADMIN — ENOXAPARIN SODIUM 30 MG: 100 INJECTION SUBCUTANEOUS at 00:34

## 2021-03-11 RX ADMIN — GUAIFENESIN 1200 MG: 600 TABLET ORAL at 17:35

## 2021-03-11 RX ADMIN — AMITRIPTYLINE HYDROCHLORIDE 10 MG: 10 TABLET, FILM COATED ORAL at 21:25

## 2021-03-11 RX ADMIN — ALUMINUM HYDROXIDE, MAGNESIUM HYDROXIDE, DIMETHICONE 30 ML: 200; 200; 20 LIQUID ORAL at 13:46

## 2021-03-11 NOTE — PROGRESS NOTES
Oxygen Qualifier       Room air:   Resting SpO2  93%   Ambulating SpO2  89%         Completed by:    Mariano Kanner, RT

## 2021-03-11 NOTE — PROGRESS NOTES
Hospitalist Progress Note     Admit Date:  2021  9:06 AM   Name:  Poppy Truong   Age:  64 y.o.  :  1960   MRN:  [id number]   PCP:  Brinda Rust MD  Treatment Team: Attending Provider: Anson Mckay MD; Care Manager: Sandra Bellamy [facility]; Utilization Review: Doe Crum; Staff Nurse: Harinder Perez, RN; Physical Therapist: Riley Biggs, PT, DPT; Occupational Therapist: Vandana Sanchez, OTR/L  Presenting Complaint: Shortness of Breath and Concern For COVID-19 (Coronavirus)      Hospital Course:   64 y.o. male with medical h/o anxiety and GERD who was seen in the ER on 2021 and diagnosed with COVID-19 pneumonia. [facility] was discharged to home but per him he continued to get progressively worse and short of breath.  Per the patient he denies entire family are sick with the COVID-19. Johnna Scheuermann did have a gathering for the Super Bowl and this is where he thinks he may have gotten infected. \" S/p convalescent plasma on . Last dose of remdesivir . S/p Ceftriaxone and doxycycline one dose. Slow to wean off oxygen was reason for extended stay. 24hr Events/Subjective:   Pt feeling better. Less SOB. He is on 4L today, down from 5L yesterday. I turned him down to 3L at bedside and he stays in mid 90s unless he starts talking for more than 10 seconds, which drops his sats; they quickly recover with deep breaths. No CP, fevers. Some cramping \"gas\" pain in lower abd, better with BMs.       No other complaints  Assessment and Plan:     Hospital Problems as of 3/11/2021 Date Reviewed: 2021          Codes Class Noted - Resolved POA    * (Principal) Acute respiratory failure due to severe acute respiratory syndrome coronavirus 2 (SARS-CoV-2) infection (HCC) ICD-10-CM: U07.1, J96.00  ICD-9-CM: 518.81, 079.89  2021 - Present         COVID-19 ICD-10-CM: U07.1  ICD-9-CM: 079.89  2021 - Present Unknown        Gastroesophageal reflux disease without esophagitis ICD-10-CM: K21.9  ICD-9-CM: 530.81  7/18/2018 - Present Yes        Dyslipidemia ICD-10-CM: E78.5  ICD-9-CM: 272.4  9/2/2015 - Present Yes              Plan:   Acute respiratory failure due to severe acute respiratory syndrome coronavirus 2   -has completed available therapies  -mucinex  -albuterol  -cont weaning oxygen as able.  -possibly home with home health oxygen later today or tomorrow    Hypotension  -borderline low but asymptomatic. Given lack of symptoms no treatment    Tachycardia  -fluctuates. Maybe from albuterol. No symptoms. monitor    Other listed chronic conditions stable, continue current management. Diet:  DIET NUTRITIONAL SUPPLEMENTS  DIET REGULAR  DVT ppx:  lovenox    Objective:     Patient Vitals for the past 24 hrs:   Temp Pulse Resp BP SpO2   03/11/21 0949 97.9 °F (36.6 °C) (!) 114 18 95/80 97 %   03/11/21 0745 -- -- -- -- 98 %   03/11/21 0445 98.2 °F (36.8 °C) 95 17 102/72 96 %   03/11/21 0005 98.1 °F (36.7 °C) 93 18 (!) 88/63 96 %   03/10/21 1946 98.8 °F (37.1 °C) (!) 109 18 104/82 96 %   03/10/21 1640 98.1 °F (36.7 °C) (!) 101 17 102/76 98 %   03/10/21 1411 -- -- -- -- 96 %     Oxygen Therapy  O2 Sat (%): 97 % (03/11/21 0949)  Pulse via Oximetry: 102 beats per minute (03/11/21 0745)  O2 Device: Nasal cannula (03/11/21 0745)  Skin Assessment: Clean, dry, & intact (03/09/21 2032)  Skin Protection for O2 Device: N/A (03/08/21 0830)  O2 Flow Rate (L/min): 5 l/min(weaned to 4L) (03/11/21 0745)  O2 Temperature: 87.8 °F (31 °C) (03/09/21 2032)  FIO2 (%): 40 % (03/09/21 2032)    Estimated body mass index is 28.13 kg/m² as calculated from the following:    Height as of this encounter: 5' 8\" (1.727 m). Weight as of this encounter: 83.9 kg (185 lb).     Intake/Output Summary (Last 24 hours) at 3/11/2021 1227  Last data filed at 3/11/2021 0533  Gross per 24 hour   Intake 600 ml   Output 870 ml   Net -270 ml       *Note that automatically entered I/Os may not be accurate; dependent on patient compliance with collection and accurate  by [employer]. General:    Well nourished. No overt distress  CV:   RRR. No edema. No JVD  Lungs:   Even, Unlabored  Abdomen:   nondistended. Extremities: Warm and dry. No cyanosis   Skin:     No rashes. Normal coloration  Neuro:  No gross focal deficits. Alert    Data Reviewed:  I have reviewed all labs, meds, and studies from the last 24 hours:  No results found for this or any previous visit (from the past 24 hour(s)).     Current Meds:  Current Facility-Administered Medications   Medication Dose Route Frequency    alum-mag hydroxide-simeth (MYLANTA) oral suspension 30 mL  30 mL Oral Q4H PRN    guaiFENesin ER (MUCINEX) tablet 1,200 mg  1,200 mg Oral BID    famotidine (PEPCID) tablet 20 mg  20 mg Oral BID    bismuth subsalicylate (PEPTO-BISMOL) oral suspension 30 mL  30 mL Oral Q6H PRN    temazepam (RESTORIL) capsule 15 mg  15 mg Oral QHS PRN    albuterol (PROVENTIL HFA, VENTOLIN HFA, PROAIR HFA) inhaler 2 Puff  2 Puff Inhalation TID RT    phenol throat spray (CHLORASEPTIC) 1 Spray  1 Spray Oral PRN    loperamide (IMODIUM) capsule 2 mg  2 mg Oral Q4H PRN    LORazepam (ATIVAN) tablet 1 mg  1 mg Oral Q6H PRN    0.9% sodium chloride infusion 250 mL  250 mL IntraVENous PRN    amitriptyline (ELAVIL) tablet 10 mg  10 mg Oral QHS    benzonatate (TESSALON) capsule 100 mg  100 mg Oral TID PRN    guaiFENesin-codeine (ROBITUSSIN AC) 100-10 mg/5 mL solution 5 mL  5 mL Oral TID PRN    montelukast (SINGULAIR) tablet 10 mg  10 mg Oral DAILY    tiZANidine (ZANAFLEX) tablet 4 mg  4 mg Oral TID PRN    hyoscyamine SL (LEVSIN/SL) tablet 0.25 mg  0.25 mg SubLINGual Q4H PRN    enoxaparin (LOVENOX) injection 30 mg  30 mg SubCUTAneous Q12H    acetaminophen (TYLENOL) tablet 650 mg  650 mg Oral Q6H PRN    Or    acetaminophen (TYLENOL) suppository 650 mg  650 mg Rectal Q6H PRN    guaiFENesin (ROBITUSSIN) 100 mg/5 mL oral liquid 200 mg  200 mg Oral Q4H PRN    ondansetron (ZOFRAN) injection 4 mg  4 mg IntraVENous Q6H PRN       Other Studies:  No results found for this visit on 02/21/21. No results found.     All Micro Results     Procedure Component Value Units Date/Time    CULTURE, BLOOD [[id number]] Collected: 02/21/21 0921    Order Status: Completed Specimen: Blood Updated: 02/26/21 0803     Special Requests: --        LEFT  Antecubital       Culture result: NO GROWTH 5 DAYS       CULTURE, BLOOD [[id number]] Collected: 02/21/21 0930    Order Status: Completed Specimen: Blood Updated: 02/26/21 0803     Special Requests: --        RIGHT  FOREARM       Culture result: NO GROWTH 5 DAYS             SARS-CoV-2 Lab Results  \"Novel Coronavirus\" Test: No results found for: COV2NT   \"Emergent Disease\" Test: No results found for: EDPR  \"SARS-COV-2\" Test: No results found for: XGCOVT  Rapid Test: No results found for: COVR         Signed:  Mihir Blackwood MD

## 2021-03-11 NOTE — PROGRESS NOTES
ACUTE PHYSICAL THERAPY GOALS:  (Developed with and agreed upon by patient and/or caregiver.)  Updated Goals 3/10/2021   LTG:  (1.)Mr. Lee Herrera will ambulate with modified INDEPENDENCE for 300+ feet with no device with SpO2 >88% within 7 day(s). (2.)Mr. Lee Herrera will perform standing static and dynamic balance activities x 15 minutes with SUPERVISION to improve safety within 7 day(s). (3.)Mr. Lee Herrera will ascend and descend 4 stairs (if off droplet precautions) using one hand rail(s) with modified independence to improve functional mobility and safety within 7 day(s). PHYSICAL THERAPY: Daily Note Treatment Day # 2    India Escobar is a 64 y.o. male   PRIMARY DIAGNOSIS: Acute respiratory failure due to severe acute respiratory syndrome coronavirus 2 (SARS-CoV-2) infection ([facility])  COVID-19 [U07.1]  Respiratory failure with hypoxia ([address] [J96.91]         ASSESSMENT:     REHAB RECOMMENDATIONS: CURRENT LEVEL OF FUNCTION:  (Most Recently Demonstrated)   Recommendation to date pending progress:  [address] [place] [address] Therapy   vs none  Equipment:    None Bed Mobility:   Independent  Sit to Stand:   Independent  Transfers:   Independent  Gait/Mobility:   Standby Assistance     ASSESSMENT:  Mr. Lee Herrera states he didn't sleep well last night. Worked on increasing normal gait pattern, balance, activity tolerance. Patient able to ambulate further today  Will continue to work towards goals. SUBJECTIVE:   Mr. Lee Herrera states, \"I'm ready to go home. .\"    SOCIAL HISTORY/ LIVING ENVIRONMENT: IND with all ADLs, no AD, drives, works for [employer] with lots of standing, moving around. Desires to return when cleared.   Home Environment: Private residence  One/Two Story Residence: Two story  Living Alone: No  Support Systems: Family member(s)  OBJECTIVE:     PAIN: VITAL SIGNS: LINES/DRAINS:   Pre Treatment: Pain Screen  Pain Scale 1: Numeric (0 - 10)  Pain Intensity 1: 0  Post Treatment: 0   continuous O2 sat  O2 Device: Nasal cannula     MOBILITY: I Mod I S SBA CGA Min Mod Max Total  NT x2 Comments:   Bed Mobility    Rolling [x] [] [] [] [] [] [] [] [] [] []    Supine to Sit [x] [] [] [] [] [] [] [] [] [] []    Scooting [x] [] [] [] [] [] [] [] [] [] []    Sit to Supine [x] [] [] [] [] [] [] [] [] [] []    Transfers    Sit to Stand [x] [] [] [] [] [] [] [] [] [] []    Bed to Chair [x] [] [] [] [] [] [] [] [] [] []    Stand to Sit [x] [] [] [] [] [] [] [] [] [] []    I=Independent, Mod I=Modified Independent, S=Supervision, SBA=Standby Assistance, CGA=Contact Guard Assistance,   Min=Minimal Assistance, Mod=Moderate Assistance, Max=Maximal Assistance, Total=Total Assistance, NT=Not Tested    BALANCE: Good Fair+ Fair Fair- Poor NT Comments   Sitting Static [x] [] [] [] [] []    Sitting Dynamic [x] [] [] [] [] []              Standing Static [] [x] [] [] [] []    Standing Dynamic [] [] [x] [] [] []      GAIT: I Mod I S SBA CGA Min Mod Max Total  NT x2 Comments:   Level of Assistance [] [] [] [x] [] [] [] [] [] [] []    Distance 200    DME None    Gait Quality Slow, cautious, decreased step length, wide DARION    Weightbearing  Status N/A     I=Independent, Mod I=Modified Independent, S=Supervision, SBA=Standby Assistance, CGA=Contact Guard Assistance,   Min=Minimal Assistance, Mod=Moderate Assistance, Max=Maximal Assistance, Total=Total Assistance, NT=Not Tested    PLAN:   FREQUENCY/DURATION: PT Plan of Care: 3 times/week for duration of hospital stay or until stated goals are met, whichever comes first.  TREATMENT:     TREATMENT:   ($$ Therapeutic Activity: 23-37 mins    )  Therapeutic Activity (30 Minutes): Therapeutic activity included Ambulation on level ground, Standing balance and exercises to improve functional Mobility, Strength and Activity tolerance.     TREATMENT GRID:  N/A    Date: 3/11/21        Ambulation  Device  assistance         Partial Squats         Hip Abduction/ Adduction Standing         Heel Raises  Standing Toe Raises Standing         Hip Flexion Standing         Wall push ups 2x10        lunges x5  Holding rail        sidestepping x50' Dolores        Key:  R=right, L=left, B=bilaterally, A=active, AA=active assisted, P=passive    AFTER TREATMENT POSITION/PRECAUTIONS:  sitting EOB    INTERDISCIPLINARY COLLABORATION:  RN/PCT and PT/PTA    TOTAL TREATMENT DURATION:  PT Patient Time In/Time Out  Time In: 1100  Time Out: 2500 Rachel Jimenez PT, DPT

## 2021-03-12 ENCOUNTER — HOME HEALTH ADMISSION (OUTPATIENT)
Dept: HOME HEALTH SERVICES | Facility: HOME HEALTH | Age: 61
End: 2021-03-12
Payer: COMMERCIAL

## 2021-03-12 VITALS
RESPIRATION RATE: 18 BRPM | DIASTOLIC BLOOD PRESSURE: 73 MMHG | WEIGHT: 185 LBS | HEIGHT: 68 IN | SYSTOLIC BLOOD PRESSURE: 98 MMHG | OXYGEN SATURATION: 96 % | HEART RATE: 105 BPM | BODY MASS INDEX: 28.04 KG/M2 | TEMPERATURE: 97 F

## 2021-03-12 PROCEDURE — 74011250637 HC RX REV CODE- 250/637: Performed by: INTERNAL MEDICINE

## 2021-03-12 PROCEDURE — 2709999900 HC NON-CHARGEABLE SUPPLY

## 2021-03-12 PROCEDURE — 94640 AIRWAY INHALATION TREATMENT: CPT

## 2021-03-12 PROCEDURE — 74011250637 HC RX REV CODE- 250/637: Performed by: FAMILY MEDICINE

## 2021-03-12 PROCEDURE — 94762 N-INVAS EAR/PLS OXIMTRY CONT: CPT

## 2021-03-12 PROCEDURE — 74011250636 HC RX REV CODE- 250/636: Performed by: INTERNAL MEDICINE

## 2021-03-12 RX ADMIN — GUAIFENESIN 1200 MG: 600 TABLET ORAL at 09:00

## 2021-03-12 RX ADMIN — MONTELUKAST 10 MG: 10 TABLET, FILM COATED ORAL at 09:00

## 2021-03-12 RX ADMIN — ENOXAPARIN SODIUM 30 MG: 100 INJECTION SUBCUTANEOUS at 12:41

## 2021-03-12 RX ADMIN — FAMOTIDINE 20 MG: 20 TABLET ORAL at 08:59

## 2021-03-12 RX ADMIN — ALBUTEROL SULFATE 2 PUFF: 108 INHALANT RESPIRATORY (INHALATION) at 13:47

## 2021-03-12 RX ADMIN — ALBUTEROL SULFATE 2 PUFF: 108 INHALANT RESPIRATORY (INHALATION) at 07:36

## 2021-03-12 RX ADMIN — ACETAMINOPHEN 650 MG: 325 TABLET, FILM COATED ORAL at 10:10

## 2021-03-12 NOTE — PROGRESS NOTES
Assumed care of the patient. Off going report given by Dereck Arceo. The patient is AO x 4 at this time and is resting in bed. IV access: PIV x2 saline locked  Oxygen needs: stable on room air, saturations 96%  Pain assessment: No complaints at this time  Safety: Bed is low and call light is within reach. Patient understands to call for assistance.

## 2021-03-12 NOTE — PROGRESS NOTES
Problem: Falls - Risk of  Goal: *Absence of Falls  Description: Document Brad Minaya Fall Risk and appropriate interventions in the flowsheet. Outcome: Progressing Towards Goal  Note: Fall Risk Interventions:  Mobility Interventions: Bed/chair exit alarm         Medication Interventions: Evaluate medications/consider consulting pharmacy, Patient to call before getting OOB, Teach patient to arise slowly    Elimination Interventions: Call light in reach, Patient to call for help with toileting needs    History of Falls Interventions: Bed/chair exit alarm         Problem: Pressure Injury - Risk of  Goal: *Prevention of pressure injury  Description: Document Stephan Scale and appropriate interventions in the flowsheet.   Outcome: Progressing Towards Goal  Note: Pressure Injury Interventions:  Sensory Interventions: Assess changes in LOC, Check visual cues for pain    Moisture Interventions: Absorbent underpads, Check for incontinence Q2 hours and as needed    Activity Interventions: Increase time out of bed    Mobility Interventions: PT/OT evaluation    Nutrition Interventions: Document food/fluid/supplement intake    Friction and Shear Interventions: Minimize layers, Lift sheet                Problem: Breathing Pattern - Ineffective  Goal: Ability to achieve and maintain a regular respiratory rate  Outcome: Progressing Towards Goal     Problem: Nutrition Deficits  Goal: Optimize nutrtional status  Outcome: Progressing Towards Goal     Problem: Isolation Precautions - Risk of Spread of Infection  Goal: Prevent transmission of infectious organism to others  Outcome: Progressing Towards Goal

## 2021-03-12 NOTE — PROGRESS NOTES
Pt to d/c home today. No home DME required. Pt is on RA. [facility] ordered - RN, PT, OT, SW.  They are one of the only HH in network with pt's insurance. CM spoke with pt's son Karl Monday [phone] regarding d/c plan. Pt does reside alone. Marlene Diaz will provide transportation home. No further supportive care needs identified. CM will continue to follow plan of care. Care Management Interventions  PCP Verified by CM: Yes(Joby Sheldon MD)  Mode of Transport at Discharge: Other (see comment)(Family)  Transition of Care Consult (CM Consult): Discharge Planning, [address]: Yes  Discharge Durable Medical Equipment: No  Physical Therapy Consult: Yes  Occupational Therapy Consult: Yes  Speech Therapy Consult: No  Current Support Network: Own Home, Lives Alone, Family Lives Nearby  Confirm Follow Up Transport: Family  The Plan for Transition of Care is Related to the Following Treatment Goals : Pt requires home health to return to functional baseline in the community.   The Patient and/or Patient Representative was Provided with a Choice of Provider and Agrees with the Discharge Plan?: Yes  Freedom of Choice List was Provided with Basic Dialogue that Supports the Patient's Individualized Plan of Care/Goals, Treatment Preferences and Shares the Quality Data Associated with the Providers?: Yes   Resource Information Provided?: No  Discharge Location  Discharge Placement: Home with home health

## 2021-03-12 NOTE — DISCHARGE INSTRUCTIONS
Patient Education        Learning About the COVID-19 Vaccine  Overview     The COVID-19 vaccine can help you avoid getting COVID-19, a disease caused by a new type of coronavirus. COVID-19 can cause pneumonia and even death. You may need two doses of the vaccine. And you might need \"booster\" doses later on to help you stay protected. The vaccine prevents most cases of COVID-19. But if you do still catch COVID-19, your symptoms will probably be less severe than if you hadn't gotten the vaccine. You can't get COVID-19 from the vaccine. The risk of serious problems from the vaccine is very low. And you might not have any side effects from the vaccine at all. If you do, they will probably be a lot like the common side effects of other vaccines. They include things like a slight fever, muscle aches, and soreness. These side effects don't last too long, and they can be treated if they bother you. Why is it a good idea to get the COVID-19 vaccine? The COVID-19 vaccine is one of the best ways to help stop the pandemic. Getting vaccinated as soon as you can will help protect you from the virus. It will also help you protect people around you from the virus--people who could really be hurt. The COVID-19 vaccine is safe and effective. In fact, the risk of serious problems from COVID-19 is much higher than the risk of serious problems from the vaccine. So it's safer to get the vaccine than it is to catch COVID-19. Who should get the COVID-19 vaccine? Everyone who is able to get the vaccine should get it as soon as possible. The more people who get vaccinated, the better we'll be able to stop the spread of the virus. The vaccine is extra important for people who are at high risk. This includes people who may be exposed to COVID-19 more often because of their jobs. It also includes people who are at high risk for complications from [id number] if they catch it.  Some examples of people at high risk include those who:  · Work in health care. · Are considered essential workers. · Have certain health conditions. · Are older than age 72. If you've already had COVID-19, you may still be able to catch it again. Getting the vaccine may provide extra protection. Where can you learn more? Go to http://www.gray.com/  Enter C124 in the search box to learn more about \"Learning About the COVID-19 Vaccine. \"  Current as of: December 18, 2020               Content Version: 12.7  © 2006-2021 [employer]wise, [facility]. Care instructions adapted under license by [employer] (which disclaims liability or warranty for this information). If you have questions about a medical condition or this instruction, always ask your healthcare professional. [address] any warranty or liability for your use of this information.

## 2021-03-15 NOTE — DISCHARGE SUMMARY
Hospitalist Discharge Summary     Admit Date:  2021  9:06 AM   DC note date: 3/15/2021  Name:  Kevin Marks   Age:  64 y.o.  :  1960   MRN:  [id number]   PCP:  Hernán Tobias MD  Treatment Team: Care Manager: Silvestre Pallas, LMSW; Utilization Review: Ozzy Brandt; Staff Nurse: Prem Jarrell, RN; Primary Nurse: Ranjan Schuler RN; Occupational Therapist: Shona Doran OTR/L  Presenting Complaint: Shortness of Breath and Concern For COVID-19 (Coronavirus)      Problem List for this Hospitalization:  Hospital Problems as of 3/12/2021 Date Reviewed: 2021          Codes Class Noted - Resolved POA    * (Principal) Acute respiratory failure due to severe acute respiratory syndrome coronavirus 2 (SARS-CoV-2) infection ([facility] ICD-10-CM: U07.1, J96.00  ICD-9-CM: 518.81, 079.89  2021 - Present         COVID-19 ICD-10-CM: U07.1  ICD-9-CM: 079.89  2021 - Present Unknown        Gastroesophageal reflux disease without esophagitis ICD-10-CM: K21.9  ICD-9-CM: 530.81  2018 - Present Yes        Dyslipidemia ICD-10-CM: E78.5  ICD-9-CM: 272.4  2015 - Present Yes                Admission HPI from 2021:    \" Patient is a 64 y.o. male with medical h/o anxiety and chronic gastric who was seen in the ER on 2021 and diagnosed with COVID-19 pneumonia. He was discharged to home but per him he continued to get progressively worse and short of breath. Per the patient he denies entire family are sick with the COVID-19. They did have a gathering for the Super Bowl and this is where he thinks he may have gotten infected.      In the ER the patient was found to have O2 sats of 80 to 84% on room air. He was placed on 4 L of oxygen as above. And the hospitalist were asked to admit him for further management and treatment. The patient is very anxious. He is short of breath and nausea. But he denies any fevers, chills, diarrhea or burning or pain on micturition. He has had worsening of his baseline nausea and vomiting over the last several days with the COVID-19. [facility] and has had extensive work-up in the past for the nausea and vomiting as well as general dyspeptic symptoms with no etiology found. He states that he does run in his family and they called it the Carlos Dizkon curse. \"    Hospital Course:  Admitted for Vicky. S/p convalescent plasma on 2/22. Last dose of remdesivir 2/25. S/p Ceftriaxone and doxycycline one dose in ER. Slow to wean off oxygen was reason for extended stay. Abruptly over last 2 days we were able to wean him from 10L to off completely. He is stable enougn for DC home. Understands he needs to pace himself towards returns to normal activity levels    Disposition: Home Health Care Svc  Activity: Activity as tolerated  Diet: No diet orders on file  Code Status: Prior    Follow Up Orders: Follow-up Appointments   Procedures    FOLLOW UP VISIT Appointment in: Two Weeks PCP for follow up     PCP for follow up     Standing Status:   Standing     Number of Occurrences:   1     Order Specific Question:   Appointment in     Answer: Two Weeks       Follow-up Information     Follow up With Specialties Details Why Jenn Melendez MD Family Medicine On 3/26/2021 Apt time 9:30 am [address] [address]  [phone]            Plan was discussed with pt. All questions answered. Patient was stable at time of discharge. Given instructions to call a physician or return if any concerns. Discharge summary and encounter summary was sent to PCP electronically via \"Comm [facility]gt\" link in [facility], if possible.     Discharge Info:   Discharge Medication List as of 3/12/2021  3:11 PM      CONTINUE these medications which have NOT CHANGED    Details   cetirizine (ZyrTEC) 10 mg tablet Take 10 mg by mouth., Historical Med      ondansetron (ZOFRAN ODT) 8 mg disintegrating tablet Take 1 Tab by mouth every eight (8) hours as needed for Nausea., Normal, Disp-8 Tab, R-0      LORazepam (ATIVAN) 1 mg tablet Take 1 Tab by mouth every eight (8) hours as needed for Anxiety. Max Daily Amount: 3 mg., Print, Disp-90 Tab, R-0      guaiFENesin-codeine (GUAIFENESIN AC) 100-10 mg/5 mL solution Take 5 mL by mouth three (3) times daily as needed for Cough. Max Daily Amount: 15 mL. , Print, Disp-120 mL, R-0      amitriptyline (ELAVIL) 10 mg tablet TAKE 1 TABLET BY MOUTH EVERYDAY AT BEDTIME, Historical Med, R-3      hyoscyamine SL (LEVSIN/SL) 0.125 mg SL tablet TAKE 1 TABLET BY SUBLINGUAL ROUTE 4 TIMES EVERY DAY AS NEEDED FOR PAIN/CRAMPING, Historical Med, R-2      omeprazole (PRILOSEC) 40 mg capsule Take 40 mg by mouth daily. , Historical Med      tiZANidine (ZANAFLEX) 4 mg tablet Take 1 Tab by mouth three (3) times daily as needed., Normal, Disp-30 Tab, R-1      naproxen (NAPROSYN) 500 mg tablet Take 1 Tab by mouth two (2) times daily (with meals). , Normal, Disp-60 Tab, R-5      Lactobacillus acidophilus 10 billion cell cap 1 Billion One capsule daily, Historical Med      montelukast (SINGULAIR) 10 mg tablet Take 1 Tab by mouth daily. , Normal, Disp-30 Tab, R-3      zolpidem (AMBIEN) 5 mg tablet Take 1 Tab by mouth nightly as needed for Sleep. Max Daily Amount: 5 mg., Print, Disp-30 Tab, R-5      famotidine (PEPCID) 20 mg tablet Take 20 mg by mouth two (2) times a day., Historical Med         STOP taking these medications       benzonatate (Tessalon Perles) 100 mg capsule Comments:   Reason for Stopping:               Procedures done this admission:  * No surgery found *    Echocardiogram/EKG results:  No results found for this visit on 02/21/21.     Results for orders placed or performed during the hospital encounter of 02/21/21   EKG, 12 LEAD, INITIAL   Result Value Ref Range    Ventricular Rate 94 BPM    Atrial Rate 94 BPM    P-R Interval 172 ms    QRS Duration 92 ms    Q-T Interval 328 ms    QTC Calculation (Mianet) 410 ms    Calculated P Axis 71 degrees    Calculated R Axis 14 degrees    Calculated T Axis 55 degrees    Diagnosis       !! AGE AND GENDER SPECIFIC ECG ANALYSIS !!  Normal sinus rhythm  Normal ECG  When compared with ECG of 21-FEB-2021 09:15,  Nonspecific T wave abnormality no longer evident in Anterior leads  Confirmed by Brody Palmer ([id number]) on 2/22/2021 10:23:44 AM     Results for orders placed or performed in visit on 09/02/15   AMB POC EKG ROUTINE W/ 12 LEADS, INTER & REP    Impression    Normal         Diagnostic Imaging/Tests:   Ct Chest W Cont    Result Date: 2/22/2021  CT CHEST WITH CONTRAST  2/21/2021 9:58 PM HISTORY:  COVID 19 Pneumonia, acute desaturations please Eval for PE; COVID 19 Pneumonia, acute desaturations please Eval for PE TECHNIQUE: The patient received 100 mL Isovue-370 nonionic IV contrast and axial images were obtained through the chest. Coronal reformatted images were generated.   All CT scans at this facility used dose modulation, interactive reconstruction and/or weight based dosing when appropriate to reduce radiation dose to as low as reasonably achievable. COMPARISON:  Chest radiograph from the same day FINDINGS: There are no filling defects within the main or proximal segmental pulmonary artery suggestive of emboli. The thoracic aorta is well-opacified without dissection. Scattered small mediastinal and hilar lymph nodes are present and may be reactive nodes in the setting of infection. Diffuse bilateral infiltrates and groundglass opacities are present. This pattern is consistent with a viral pneumonia. Pleural spaces are clear. Multiple hepatic cysts are present. Cholecystectomy clips are present. There are renal cyst along with a more complex appearing left renal lesion that measures 6 cm in diameter. Follow-up renal imaging is recommended for further characterization. Bone windows demonstrate no aggressive osseous lesions.     1. No pulmonary embolism. 2. Diffuse bilateral  infiltrates. 3. Indeterminate 6 cm left renal mass. A follow-up renal ultrasound or renal mass protocol CT is recommended for further characterization. This case was referred to the radiology navigator for follow-up assistance. Us Retroperitoneum Comp    Result Date: 3/5/2021  INDICATION:  Left renal mass noted on chest CT TECHNIQUE: Real-time sonography of the kidneys, retroperitoneum and bladder was performed with multiple static images obtained. FINDINGS: The right kidney measures 12.0 cm and the left kidney measures 13.0 cm in length. There is a septated cystic 4 cm mass in the lateral right kidney. There is also a septated 6 mm mass in the upper pole left kidney. Several simple cysts are present in both kidneys. There is no definite solid renal mass. There is no hydronephrosis. The aorta and IVC are normal in caliber. The urinary bladder is unremarkable. Septated cysts in both kidneys, grossly stable compared with ultrasound from 2010. Xr Chest Port    Result Date: 2/21/2021  Portable chest xray  Comparison: 2/18/2021 and prior Indication: Shortness of breath Findings: Mild cardiac enlargement. Increased mild perihilar pulmonary infiltrates without consolidation, effusion, pneumothorax. No discrete osseous abnormality on the single view. Increased pulmonary infiltrates without consolidation. FINDINGS can be seen with volume overload and/or infection. Xr Chest Port    Result Date: 2/18/2021  PA CHEST X-RAY. Clinical Indication: Covid 19 positive , shortness of breath, cough, fever Comparison: Chest x-ray dated 10/5/2019 Findings: Single view of the chest submitted demonstrate the cardiac silhouette and mediastinum to be unremarkable. There is no pleural effusion or pneumothorax. The lungs are underexpanded bilaterally. No definite airspace consolidation or infiltrate. Underexpanded lungs without definite acute abnormality.       All Micro Results     Procedure Component Value Units Date/Time    CULTURE, BLOOD [[id number]] Collected: 02/21/21 0921    Order Status: Completed Specimen: Blood Updated: 02/26/21 0803     Special Requests: --        LEFT  Antecubital       Culture result: NO GROWTH 5 DAYS       CULTURE, BLOOD [[id number]] Collected: 02/21/21 0930    Order Status: Completed Specimen: Blood Updated: 02/26/21 0803     Special Requests: --        RIGHT  FOREARM       Culture result: NO GROWTH 5 DAYS             SARS-CoV-2 Lab Results  \"Novel Coronavirus\" Test: No results found for: COV2NT   \"Emergent Disease\" Test: No results found for: EDPR  \"SARS-COV-2\" Test: No results found for: XGCOVT  Rapid Test: No results found for: COVR         Labs: Results:       BMP, Mg, Phos No results for input(s): NA, K, CL, CO2, AGAP, BUN, CREA, CA, GLU, MG, PHOS in the last 72 hours. CBC No results for input(s): WBC, RBC, HGB, HCT, PLT, GRANS, LYMPH, EOS, MONOS, BASOS, IG, ANEU, ABL, ANTHONY, ABM, ABB, AIG, HGBEXT, HCTEXT, PLTEXT in the last 72 hours. LFT No results for input(s): ALT, TBIL, AP, TP, ALB, GLOB, AGRAT in the last 72 hours.     No lab exists for component: SGOT, GPT   Cardiac Testing Lab Results   Component Value Date/Time    Troponin-I, Qt. <0.02 (L) 10/05/2019 01:48 PM    Troponin-I, Qt. <0.02 (L) 10/05/2019 10:37 AM    Troponin-I, Qt. <0.02 (L) 10/22/2017 01:38 PM      Coagulation Tests No results found for: PTP, INR, APTT, INREXT   A1c No results found for: HBA1C, HGBE8, WUN4BMRT   Lipid Panel Lab Results   Component Value Date/Time    Cholesterol, total 242 (H) 02/05/2016 08:40 AM    HDL Cholesterol 36 (L) 02/05/2016 08:40 AM    LDL, calculated 175 (H) 02/05/2016 08:40 AM    VLDL, calculated 31 02/05/2016 08:40 AM    Triglyceride 157 (H) 02/05/2016 08:40 AM      Thyroid Panel No results found for: TSH, T4, FT4, TT3, T3U, TSHEXT     Most Recent UA Lab Results   Component Value Date/Time    Color YELLOW 02/21/2021 12:50 PM    Appearance CLEAR 02/21/2021 12:50 PM    Specific gravity 1.018 02/21/2021 12:50 PM    pH (UA) 6.0 02/21/2021 12:50 PM    Protein 100 (A) 02/21/2021 12:50 PM    Glucose Negative 02/21/2021 12:50 PM    Ketone 40 (A) 02/21/2021 12:50 PM    Bilirubin Negative 02/21/2021 12:50 PM    Blood Negative 02/21/2021 12:50 PM    Urobilinogen 0.2 02/21/2021 12:50 PM    Nitrites Negative 02/21/2021 12:50 PM    Leukocyte Esterase Negative 02/21/2021 12:50 PM    WBC 0-3 02/21/2021 12:50 PM    RBC 0-3 02/21/2021 12:50 PM    Epithelial cells 0 02/21/2021 12:50 PM    Bacteria 0 02/21/2021 12:50 PM    Casts 0-3 02/21/2021 12:50 PM    Mucus 3+ (A) 03/30/2017 09:12 AM          All Labs from Last 24 Hrs:  No results found for this or any previous visit (from the past 24 hour(s)). Discharge Exam:  No data found. Oxygen Therapy  O2 Sat (%): 96 % (03/12/21 1347)  Pulse via Oximetry: 108 beats per minute (03/12/21 1347)  O2 Device: Room air (03/12/21 1347)  Skin Assessment: Clean, dry, & intact (03/09/21 2032)  Skin Protection for O2 Device: N/A (03/08/21 0830)  O2 Flow Rate (L/min): 5 l/min(weaned to 4L) (03/11/21 0745)  O2 Temperature: 87.8 °F (31 °C) (03/09/21 2032)  FIO2 (%): 40 % (03/09/21 2032)    Estimated body mass index is 28.13 kg/m² as calculated from the following:    Height as of this encounter: 5' 8\" (1.727 m). Weight as of this encounter: 83.9 kg (185 lb). No intake or output data in the 24 hours ending 03/15/21 1139    *Note that automatically entered I/Os may not be accurate; dependent on patient compliance with collection and accurate  by assistants. General:    Well nourished. No overt distress  Eyes:   Normal sclerae. Extraocular movements intact. ENT:  Normocephalic, atraumatic. Moist mucous membranes  CV:   Regular rate and rhythm. No edema. Lungs:  Even, Unlabored  Abdomen: Nondistended. Extremities: Warm and dry. No cyanosis or clubbing  Neurologic: CN II-XII grossly intact. No gross focal deficits. Alert. Skin:     No rashes. No jaundice. Psych:  Normal mood and affect. Current Med List in Hospital:   No current facility-administered medications for this encounter. Current Outpatient Medications   Medication Sig    cetirizine (ZyrTEC) 10 mg tablet Take 10 mg by mouth.  ondansetron (ZOFRAN ODT) 8 mg disintegrating tablet Take 1 Tab by mouth every eight (8) hours as needed for Nausea.  LORazepam (ATIVAN) 1 mg tablet Take 1 Tab by mouth every eight (8) hours as needed for Anxiety. Max Daily Amount: 3 mg.  guaiFENesin-codeine (GUAIFENESIN AC) 100-10 mg/5 mL solution Take 5 mL by mouth three (3) times daily as needed for Cough. Max Daily Amount: 15 mL.  naproxen (NAPROSYN) 500 mg tablet Take 1 Tab by mouth two (2) times daily (with meals).  famotidine (PEPCID) 20 mg tablet Take 20 mg by mouth two (2) times a day.  amitriptyline (ELAVIL) 10 mg tablet TAKE 1 TABLET BY MOUTH EVERYDAY AT BEDTIME    hyoscyamine SL (LEVSIN/SL) 0.125 mg SL tablet TAKE 1 TABLET BY SUBLINGUAL ROUTE 4 TIMES EVERY DAY AS NEEDED FOR PAIN/CRAMPING    omeprazole (PRILOSEC) 40 mg capsule Take 40 mg by mouth daily.  tiZANidine (ZANAFLEX) 4 mg tablet Take 1 Tab by mouth three (3) times daily as needed.  Lactobacillus acidophilus 10 billion cell cap 1 Billion One capsule daily    montelukast (SINGULAIR) 10 mg tablet Take 1 Tab by mouth daily.  zolpidem (AMBIEN) 5 mg tablet Take 1 Tab by mouth nightly as needed for Sleep. Max Daily Amount: 5 mg. Allergies   Allergen Reactions    Iohexol Diarrhea     Other reaction(s): Abdominal pain-I    Penicillin G Swelling     Immunization History   Administered Date(s) Administered    TB Skin Test (PPD) Intradermal 02/27/2021       Time spent in patient discharge planning and coordination 35 minutes.     Signed:  Max Teague MD

## 2021-03-16 ENCOUNTER — HOME CARE VISIT (OUTPATIENT)
Dept: SCHEDULING | Facility: HOME HEALTH | Age: 61
End: 2021-03-16
Payer: COMMERCIAL

## 2021-03-16 PROCEDURE — 400013 HH SOC

## 2021-03-16 PROCEDURE — G0299 HHS/HOSPICE OF RN EA 15 MIN: HCPCS

## 2021-03-19 ENCOUNTER — HOME CARE VISIT (OUTPATIENT)
Dept: SCHEDULING | Facility: HOME HEALTH | Age: 61
End: 2021-03-19
Payer: COMMERCIAL

## 2021-03-19 VITALS
SYSTOLIC BLOOD PRESSURE: 116 MMHG | OXYGEN SATURATION: 95 % | DIASTOLIC BLOOD PRESSURE: 74 MMHG | TEMPERATURE: 97.8 F | RESPIRATION RATE: 18 BRPM | HEART RATE: 90 BPM

## 2021-03-19 VITALS
SYSTOLIC BLOOD PRESSURE: 106 MMHG | HEART RATE: 78 BPM | RESPIRATION RATE: 16 BRPM | OXYGEN SATURATION: 96 % | DIASTOLIC BLOOD PRESSURE: 70 MMHG | TEMPERATURE: 97.6 F

## 2021-03-19 PROCEDURE — G0151 HHCP-SERV OF PT,EA 15 MIN: HCPCS

## 2021-03-19 PROCEDURE — G0299 HHS/HOSPICE OF RN EA 15 MIN: HCPCS

## 2021-03-23 ENCOUNTER — HOME CARE VISIT (OUTPATIENT)
Dept: SCHEDULING | Facility: HOME HEALTH | Age: 61
End: 2021-03-23
Payer: COMMERCIAL

## 2021-03-23 VITALS
HEART RATE: 71 BPM | TEMPERATURE: 97.8 F | DIASTOLIC BLOOD PRESSURE: 74 MMHG | RESPIRATION RATE: 18 BRPM | OXYGEN SATURATION: 98 % | SYSTOLIC BLOOD PRESSURE: 126 MMHG

## 2021-03-23 VITALS
SYSTOLIC BLOOD PRESSURE: 110 MMHG | RESPIRATION RATE: 16 BRPM | OXYGEN SATURATION: 97 % | DIASTOLIC BLOOD PRESSURE: 76 MMHG | TEMPERATURE: 98.5 F | HEART RATE: 82 BPM

## 2021-03-23 PROCEDURE — G0299 HHS/HOSPICE OF RN EA 15 MIN: HCPCS

## 2021-03-24 ENCOUNTER — HOME CARE VISIT (OUTPATIENT)
Dept: SCHEDULING | Facility: HOME HEALTH | Age: 61
End: 2021-03-24
Payer: COMMERCIAL

## 2021-03-24 VITALS
RESPIRATION RATE: 18 BRPM | TEMPERATURE: 97.6 F | SYSTOLIC BLOOD PRESSURE: 136 MMHG | DIASTOLIC BLOOD PRESSURE: 78 MMHG | HEART RATE: 84 BPM | OXYGEN SATURATION: 95 %

## 2021-03-24 PROCEDURE — G0151 HHCP-SERV OF PT,EA 15 MIN: HCPCS

## 2021-03-25 ENCOUNTER — HOME CARE VISIT (OUTPATIENT)
Dept: SCHEDULING | Facility: HOME HEALTH | Age: 61
End: 2021-03-25
Payer: COMMERCIAL

## 2021-03-25 PROCEDURE — G0299 HHS/HOSPICE OF RN EA 15 MIN: HCPCS

## 2021-03-26 VITALS
DIASTOLIC BLOOD PRESSURE: 68 MMHG | OXYGEN SATURATION: 95 % | TEMPERATURE: 97.1 F | HEART RATE: 75 BPM | SYSTOLIC BLOOD PRESSURE: 132 MMHG | RESPIRATION RATE: 16 BRPM

## 2021-04-20 ENCOUNTER — HOSPITAL ENCOUNTER (OUTPATIENT)
Dept: GENERAL RADIOLOGY | Age: 61
Discharge: HOME | End: 2021-04-20
Payer: COMMERCIAL

## 2021-04-20 DIAGNOSIS — R05.9 COUGH: ICD-10-CM

## 2021-04-20 PROCEDURE — 71046 X-RAY EXAM CHEST 2 VIEWS: CPT

## 2021-05-03 ENCOUNTER — HOSPITAL ENCOUNTER (OUTPATIENT)
Dept: CT IMAGING | Age: 61
Discharge: HOME | End: 2021-05-03
Attending: INTERNAL MEDICINE
Payer: COMMERCIAL

## 2021-05-03 DIAGNOSIS — R06.00 DYSPNEA, UNSPECIFIED TYPE: ICD-10-CM

## 2021-05-03 DIAGNOSIS — R07.89 OTHER CHEST PAIN: ICD-10-CM

## 2021-05-03 PROCEDURE — 71260 CT THORAX DX C+: CPT

## 2021-05-03 PROCEDURE — 74011000258 HC RX REV CODE- 258: Performed by: INTERNAL MEDICINE

## 2021-05-03 PROCEDURE — 74011000636 HC RX REV CODE- 636: Performed by: INTERNAL MEDICINE

## 2021-05-03 RX ORDER — SODIUM CHLORIDE 0.9 % (FLUSH) 0.9 %
10 SYRINGE (ML) INJECTION
Status: COMPLETED | OUTPATIENT
Start: 2021-05-03 | End: 2021-05-03

## 2021-05-03 RX ADMIN — IOPAMIDOL 80 ML: 755 INJECTION, SOLUTION INTRAVENOUS at 15:27

## 2021-05-03 RX ADMIN — Medication 10 ML: at 15:27

## 2021-05-03 RX ADMIN — SODIUM CHLORIDE 100 ML: 900 INJECTION, SOLUTION INTRAVENOUS at 15:27

## 2022-01-24 PROBLEM — R41.3 MEMORY LOSS OR IMPAIRMENT: Status: ACTIVE | Noted: 2022-01-24

## 2022-01-24 PROBLEM — Z79.899 ENCOUNTER FOR MEDICATION MANAGEMENT: Status: ACTIVE | Noted: 2022-01-24

## 2022-01-24 PROBLEM — M79.2 NEUROPATHIC PAIN: Status: ACTIVE | Noted: 2022-01-24

## 2022-01-24 PROBLEM — R29.3 POSTURAL IMBALANCE: Status: ACTIVE | Noted: 2022-01-24

## 2022-01-24 PROBLEM — R51.9 LEFT FACIAL PAIN: Status: ACTIVE | Noted: 2022-01-24

## 2022-02-09 NOTE — ED NOTES
I have reviewed discharge instructions with the patient. The patient verbalized understanding. Patient left ED via Discharge Method: ambulatory to Home with family    Opportunity for questions and clarification provided. Patient given 1 scripts. Pt in no acute distress at time of d/c        To continue your aftercare when you leave the hospital, you may receive an automated call from our care team to check in on how you are doing. This is a free service and part of our promise to provide the best care and service to meet your aftercare needs.  If you have questions, or wish to unsubscribe from this service please call [phone]. Thank you for Choosing our [facility].
With ambulation patient maintained pulse ox sat of >90% on RA.
-

## 2022-03-18 PROBLEM — R29.3 POSTURAL IMBALANCE: Status: ACTIVE | Noted: 2022-01-24

## 2022-03-18 PROBLEM — Z79.899 ENCOUNTER FOR MEDICATION MANAGEMENT: Status: ACTIVE | Noted: 2022-01-24

## 2022-03-18 PROBLEM — N20.0 KIDNEY STONES: Status: ACTIVE | Noted: 2017-11-17

## 2022-03-18 PROBLEM — R51.9 HEADACHE DISORDER: Status: ACTIVE | Noted: 2022-01-24

## 2022-03-18 PROBLEM — M79.2 NEUROPATHIC PAIN: Status: ACTIVE | Noted: 2022-01-24

## 2022-03-18 PROBLEM — U07.1 COVID-19: Status: ACTIVE | Noted: 2021-02-21

## 2022-03-19 PROBLEM — K58.9 IBS (IRRITABLE BOWEL SYNDROME): Status: ACTIVE | Noted: 2018-07-18

## 2022-03-19 PROBLEM — R41.3 MEMORY LOSS OR IMPAIRMENT: Status: ACTIVE | Noted: 2022-01-24

## 2022-03-19 PROBLEM — R74.8 ELEVATED ALKALINE PHOSPHATASE LEVEL: Status: ACTIVE | Noted: 2018-07-18
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
## 2022-03-19 PROBLEM — E34.9 TESTOSTERONE DEFICIENCY: Status: ACTIVE | Noted: 2018-03-02

## 2022-03-19 PROBLEM — S80.02XA CONTUSION OF LEFT KNEE: Status: ACTIVE | Noted: 2018-12-11

## 2022-03-19 PROBLEM — K29.00 ACUTE GASTRITIS: Status: ACTIVE | Noted: 2018-07-18

## 2022-03-19 PROBLEM — F41.9 ANXIETY DISORDER: Status: ACTIVE | Noted: 2017-04-21

## 2022-03-20 PROBLEM — K64.8 INTERNAL HEMORRHOIDS: Status: ACTIVE | Noted: 2018-07-18

## 2022-03-20 PROBLEM — M54.50 ACUTE LEFT-SIDED LOW BACK PAIN WITHOUT SCIATICA: Status: ACTIVE | Noted: 2018-12-11

## 2022-03-20 PROBLEM — J96.00 ACUTE RESPIRATORY FAILURE DUE TO SEVERE ACUTE RESPIRATORY SYNDROME CORONAVIRUS 2 (SARS-COV-2) INFECTION (HCC): Status: ACTIVE | Noted: 2021-02-21

## 2022-03-20 PROBLEM — U07.1 ACUTE RESPIRATORY FAILURE DUE TO SEVERE ACUTE RESPIRATORY SYNDROME CORONAVIRUS 2 (SARS-COV-2) INFECTION (HCC): Status: ACTIVE | Noted: 2021-02-21

## 2022-03-20 PROBLEM — K21.9 GASTROESOPHAGEAL REFLUX DISEASE WITHOUT ESOPHAGITIS: Status: ACTIVE | Noted: 2018-07-18

## 2022-03-20 PROBLEM — R93.2 ABNORMAL ULTRASOUND OF LIVER: Status: ACTIVE | Noted: 2018-07-18

## 2022-05-26 ENCOUNTER — OFFICE VISIT (OUTPATIENT)
Dept: UROLOGY | Age: 62
End: 2022-05-26
Payer: COMMERCIAL

## 2022-05-26 DIAGNOSIS — N40.0 BENIGN PROSTATIC HYPERPLASIA, UNSPECIFIED WHETHER LOWER URINARY TRACT SYMPTOMS PRESENT: ICD-10-CM

## 2022-05-26 DIAGNOSIS — N20.0 KIDNEY STONES: Primary | ICD-10-CM

## 2022-05-26 LAB
BILIRUBIN, URINE, POC: NEGATIVE
BLOOD URINE, POC: NEGATIVE
GLUCOSE URINE, POC: NEGATIVE
KETONES, URINE, POC: NEGATIVE
LEUKOCYTE ESTERASE, URINE, POC: NEGATIVE
NITRITE, URINE, POC: NEGATIVE
PH, URINE, POC: 6.5 (ref 4.6–8)
PROTEIN,URINE, POC: NEGATIVE
PVR, POC: 21 CC
SPECIFIC GRAVITY, URINE, POC: 1.02 (ref 1–1.03)
URINALYSIS CLARITY, POC: NORMAL
URINALYSIS COLOR, POC: NORMAL
UROBILINOGEN, POC: NORMAL

## 2022-05-26 PROCEDURE — 99214 OFFICE O/P EST MOD 30 MIN: CPT | Performed by: UROLOGY

## 2022-05-26 PROCEDURE — 51798 US URINE CAPACITY MEASURE: CPT | Performed by: UROLOGY

## 2022-05-26 PROCEDURE — 81003 URINALYSIS AUTO W/O SCOPE: CPT | Performed by: UROLOGY

## 2022-05-26 RX ORDER — TAMSULOSIN HYDROCHLORIDE 0.4 MG/1
0.4 CAPSULE ORAL DAILY
Qty: 30 CAPSULE | Refills: 5 | Status: SHIPPED | OUTPATIENT
Start: 2022-05-26

## 2022-05-26 ASSESSMENT — ENCOUNTER SYMPTOMS
WHEEZING: 0
CONSTIPATION: 0
SKIN LESIONS: 0
HEARTBURN: 0
EYE PAIN: 0
SHORTNESS OF BREATH: 0
BACK PAIN: 0
COUGH: 0
ABDOMINAL PAIN: 1
NAUSEA: 0
INDIGESTION: 0
VOMITING: 0
DIARRHEA: 0
BLOOD IN STOOL: 0
EYE DISCHARGE: 0

## 2022-05-26 NOTE — PROGRESS NOTES
[facility]  [address] [facility] [address], [address] [facility]  [phone]          Aleida Ward  : 1960    Chief Complaint   Patient presents with    Follow-up          HPI     Aleida Ward is a 63 y.o.  male with a PMH of Bosniak 2F cysts followed since  without change, kidney stones, BPH/LUTS and chronic prostatitis who returns for follow up. Seen 2021 by me.       He reports a long urologic history dating back to  for complex renal cysts and previously followed with [facility]. At his last visit, he complained of bothersome urgency, frequency, dysuria. He has had cystoscopy X 3 (last one in 2019) which showed BPH but no stricture or other abnormality. He has been on flomax + finasteride in the past but more recently has done well with just flomax PRN. Today, he reports 1 month of worse bladder pain, dysuria, urgency, frequency concerning for prostatitis. Also has bilateral flank pain. U/A not concerning for UTI today.      He denies hematuria, urinary retention, urinary incontinence or other symptoms/concerns. Has not passed any stones recently.      PSA 1.2 in 2020 and due today.      PVR: 21 cc    KUB: L non-obstructing small lower pole and upper pole kidney stones, multiple.   NO ureteral stones or kidney stones identified.      Past Medical History:   Diagnosis Date    Chest discomfort 2015    Tightness, pressure     Chronic left maxillary sinusitis     Depression     Dyspnea 2015    Shortness of breath      GERD (gastroesophageal reflux disease)     controlled with meds     Hypoglycemia     Kidney stone     Mixed hyperlipidemia 2015     Past Surgical History:   Procedure Laterality Date    COLONOSCOPY      HEENT Left 12/10/2020    [facility]/ [facility]     Current Outpatient Medications   Medication Sig Dispense Refill    tamsulosin (FLOMAX) 0.4 mg capsule Take 1 capsule by mouth daily 30 capsule 5    EPINEPHrine (EPIPEN) 0.3 MG/0.3ML SOAJ injection INJECT INTO OUTER THIGH FOR SEVERE ALLERGIC REACTION CALL 911 AFTER USE      gabapentin (NEURONTIN) 300 MG capsule Take 300 mg by mouth 3 times daily.  LORazepam (ATIVAN) 1 MG tablet Take 1 mg by mouth every 8 hours as needed.  tamsulosin (FLOMAX) 0.4 MG capsule Take 0.4 mg by mouth       No current facility-administered medications for this visit. Allergies   Allergen Reactions    Iohexol Diarrhea     Other reaction(s): Abdominal pain-I  5/3/21 pt states abd issues after CT scan, not allergic. 5/3/21 pt tolerated iv contrast for CT on this date without premedication and was fine. jlc    Penicillin G Swelling     Social History     Socioeconomic History    Marital status:      Spouse name: Not on file    Number of children: Not on file    Years of education: Not on file    Highest education level: Not on file   Occupational History    Not on file   Tobacco Use    Smoking status: Never Smoker    Smokeless tobacco: Never Used   Substance and Sexual Activity    Alcohol use: No    Drug use: No    Sexual activity: Not on file   Other Topics Concern    Not on file   Social History Narrative    Not on file     Social Determinants of Health     Financial Resource Strain:     Difficulty of Paying Living Expenses: Not on file   Food Insecurity:     Worried About Running Out of Food in the Last Year: Not on file    Wesley of Food in the Last Year: Not on file   Transportation Needs:     Lack of Transportation (Medical): Not on file    Lack of Transportation (Non-Medical):  Not on file   Physical Activity:     Days of Exercise per Week: Not on file    Minutes of Exercise per Session: Not on file   Stress:     Feeling of Stress : Not on file   Social Connections:     Frequency of Communication with Friends and Family: Not on file    Frequency of Social Gatherings with Friends and Family: Not on file    Attends Latter day Services: Not on file    Active Member of Clubs or Organizations: Not on file    Attends Club or Organization Meetings: Not on file    Marital Status: Not on file   Intimate Partner Violence:     Fear of Current or Ex-Partner: Not on file    Emotionally Abused: Not on file    Physically Abused: Not on file    Sexually Abused: Not on file   Housing Stability:     Unable to Pay for Housing in the Last Year: Not on file    Number of [place] in the Last Year: Not on file    Unstable Housing in the Last Year: Not on file     Family History   Problem Relation Age of Onset    Heart Attack Mother 66    Cancer Mother     Heart Disease Mother     High Cholesterol Father     Cancer Father     Diabetes Mother        Review of Systems  Constitutional:   Negative for fever, chills, appetite change, malaise/fatigue, headaches and weight loss. Skin:  Negative for skin lesions, rash and itching. Eyes:  Negative for visual disturbance, eye pain and eye discharge. ENT:  Negative for difficulty articulating words, pain swallowing, high frequency hearing loss and dry mouth. Respiratory:  Negative for cough, blood in sputum, shortness of breath and wheezing. Cardiovascular:  Negative for chest pain, hypertension, irregular heartbeat, leg pain, leg swelling, regular rate and rhythm and varicose veins. GI: Positive for abdominal pain. Negative for nausea, vomiting, blood in stool, constipation, diarrhea, indigestion and heartburn. Genitourinary: Positive for urinary burning, urgency and frequent urination. Negative for hematuria, flank pain, recurrent UTIs, history of urolithiasis, nocturia, slower stream, straining, leakage w/ urge, incomplete emptying, erectile dysfunction, testicular pain, sexually transmitted disease, discharge and urethral stricture.   Musculoskeletal:  Negative for back pain, bone pain, arthralgias, tenderness, muscle weakness and neck pain.  Neurological:  Negative for dizziness, focal weakness, numbness, seizures and tremors. Psychological:  Negative for depression and psychiatric problem. Endocrine:  Negative for cold intolerance, thirst, excessive urination, fatigue and heat intolerance. Hem/Lymphatic:  Negative for easy bleeding, easy bruising and frequent infections. Urinalysis  UA - Dipstick  Results for orders placed or performed in visit on 05/26/22   AMB POC URINALYSIS DIP STICK AUTO W/O MICRO   Result Value Ref Range    Color (UA POC)      Clarity (UA POC)      Glucose, Urine, POC Negative Negative    Bilirubin, Urine, POC Negative Negative    KETONES, Urine, POC Negative Negative    Specific Gravity, Urine, POC 1.025 1.001 - 1.035    Blood (UA POC) Negative Negative    pH, Urine, POC 6.5 4.6 - 8.0    Protein, Urine, POC Negative Negative    Urobilinogen, POC 0.2 mg/dL     Nitrite, Urine, POC Negative Negative    Leukocyte Esterase, Urine, POC Negative Negative       There were no vitals taken for this visit. GENERAL: No acute distress, Awake, Alert, Oriented X 3, Gait normal  CARDIAC: regular rate and rhythm  CHEST AND LUNG: Easy work of breathing, clear to auscultation bilaterally, no cyanosis  ABDOMEN: soft, non tender, non-distended, positive bowel sounds, no organomegaly, no palpable masses, no guarding, no rebound tenderness  DARRON: 30 gram, symmetric, smooth without nodules. SKIN: No rash, no erythema, no lacerations or abrasions, no ecchymosis  NEUROLOGIC: cranial nerves 2-12 grossly intact         Assessment and Plan    ICD-10-CM    1. Kidney stones  N20.0 AMB POC URINALYSIS DIP STICK AUTO W/O MICRO     AMB POC XRAY ABDOMEN 1 VIEW   2.  Benign prostatic hyperplasia, unspecified whether lower urinary tract symptoms present  N40.0 AMB POC URINALYSIS DIP STICK AUTO W/O MICRO     AMB POC PVR, ORLY,POST-VOID RES,US,NON-IMAGING     PSA, Diagnostic     tamsulosin (FLOMAX) 0.4 mg capsule     PSA, Diagnostic     Kidney stones:   L non-obstructing stones. They are asymptomatic and not source of symptoms today. Chronic Prostatitis / BPH:   Symptoms most consistent with prostatitis today. Will start flomax 0.4 mg daily (now only using PRN). Will avoid bladder irritants, timed/double void. Discussed course of antibiotics but he wants to hold at this time. PSA Screening:   DARRON with BPH. PSA drawn today. Will call with results. Complex Renal Cysts:   Stable on imaging X > 10 years. No further surveillance neeed    Follow up 1 year or sooner if needed with PSA prior     I have spent 30 minutes today reviewing previous notes, test results and face to face with the patient as well as documenting. Philip A. Demetra Severance, M.D.     [facility]  [address]  Phone: [phone]  Fax: [phone]

## 2022-05-27 LAB — PSA SERPL-MCNC: 1.8 NG/ML

## 2022-07-19 ENCOUNTER — TELEPHONE (OUTPATIENT)
Dept: PULMONOLOGY | Age: 62
End: 2022-07-19

## 2022-07-19 NOTE — TELEPHONE ENCOUNTER
Direct conversation with Martin Gannon NP who approves pt for albuterol to use for his SOB, she also asks that the pt is assessed accordingly.

## 2022-07-26 ENCOUNTER — TELEPHONE (OUTPATIENT)
Dept: PULMONOLOGY | Age: 62
End: 2022-07-26

## 2022-07-26 NOTE — TELEPHONE ENCOUNTER
Patient's family doctor gave him a prescription for Advair.   Asking if this is ok to use with his albuterol

## 2022-07-26 NOTE — TELEPHONE ENCOUNTER
LOV 9/30/21 -- post COVID syndrome, HLD, renal stones, depression,anxiety, GERD    Patient called to clarify if he could take advair w/ albuterol; has felt more sob during the summer months, no edema; reassured that the mechanisms of both drugs do not conflict and he could benefit from both.

## 2022-09-27 ENCOUNTER — OFFICE VISIT (OUTPATIENT)
Dept: PULMONOLOGY | Age: 62
End: 2022-09-27
Payer: COMMERCIAL

## 2022-09-27 VITALS
DIASTOLIC BLOOD PRESSURE: 80 MMHG | HEART RATE: 70 BPM | SYSTOLIC BLOOD PRESSURE: 120 MMHG | BODY MASS INDEX: 28.49 KG/M2 | RESPIRATION RATE: 20 BRPM | HEIGHT: 68 IN | WEIGHT: 188 LBS | TEMPERATURE: 98 F | OXYGEN SATURATION: 98 %

## 2022-09-27 DIAGNOSIS — R06.09 POST-COVID CHRONIC DYSPNEA: ICD-10-CM

## 2022-09-27 DIAGNOSIS — R05.3 POST-COVID CHRONIC COUGH: Primary | ICD-10-CM

## 2022-09-27 DIAGNOSIS — U09.9 POST-COVID CHRONIC DYSPNEA: ICD-10-CM

## 2022-09-27 DIAGNOSIS — J44.9 CHRONIC OBSTRUCTIVE PULMONARY DISEASE, UNSPECIFIED COPD TYPE (HCC): ICD-10-CM

## 2022-09-27 DIAGNOSIS — U09.9 POST-COVID CHRONIC COUGH: Primary | ICD-10-CM

## 2022-09-27 PROCEDURE — 99214 OFFICE O/P EST MOD 30 MIN: CPT | Performed by: INTERNAL MEDICINE

## 2022-09-27 RX ORDER — FLUTICASONE PROPIONATE AND SALMETEROL 250; 50 UG/1; UG/1
1 POWDER RESPIRATORY (INHALATION) EVERY 12 HOURS
Qty: 1 EACH | Refills: 11 | Status: SHIPPED | OUTPATIENT
Start: 2022-09-27

## 2022-09-27 NOTE — PROGRESS NOTES
Patient Name:  Bobby Terrazas                             YOB: 1960  MRN: [id number]                                              Office Visit 9/27/2022    ASSESSMENT AND PLAN:  (Medical Decision Making)    Pt with lingering symptoms following covid in Feb 2021. Post CT scan with some ongoing GGO. Treated with 2 months of steroids. Some initial improvement but now with increasing bronchospastic symptoms. Chest tightness, cough, SOB. Some better with Palak Grout but is currently out. -will restart wixela. Rx for 250 bid sent to pharmacy. -will repeat CT chest now. If ongoing GGO may consider another prolonged course of steroids. Will plan to follow up with him in about 3 months unless new changes on his CT. Campos Mantilla was seen today for other. Diagnoses and all orders for this visit:    Post-COVID chronic cough    Chronic obstructive pulmonary disease, unspecified COPD type ([facility] [address]    Post-COVID chronic dyspnea    Other orders  -     fluticasone-salmeterol (WIXELA INHUB) 250-50 MCG/ACT AEPB diskus inhaler; Inhale 1 puff into the lungs in the morning and 1 puff in the evening. Orders Placed This Encounter   Medications    fluticasone-salmeterol (WIXELA INHUB) 250-50 MCG/ACT AEPB diskus inhaler     Sig: Inhale 1 puff into the lungs in the morning and 1 puff in the evening. Dispense:  1 each     Refill:  11     No orders of the defined types were placed in this encounter. Pleasant Boxer, MD    Total time for encounter on day of encounter was 25 minutes. This time includes chart prep, review of tests/procedures, review of other provider's notes, documentation and counseling patient regarding disease process and medications.    _________________________________________________________________________    HISTORY OF PRESENT ILLNESS:    Mr. Johnney Dakins is a 63 y.o. male who is seen at [facility] today for  Other (Sequelae of other specified infectious and parasitic diseases)   He has a history of COVID with ongoing dyspnea. Seen in ER 2/18/21 and sent home. Back with hypoxia. Admitted 2/21/21. Treated with convalescent plasma and remdesivir. Required O2 for many days. Eventually weaned off and discharged 3/12/21 with home health and home PT. He had cPFT's which showed mild restriction with TLC 73% and DLCO 70%. CT PE showed no PE and improved but some residual GGO. Because of this he was treated with a slow tapering course of steroids starting at 20mg. Was seen in follow up about a year ago and had ongoing slow improvement. Plan was to cont prn albuterol. Today he states this summer has been difficult. June he called our office feeling like he could not breath. Was started back on wixela at that time and feels like it made some difference. Climbing stairs or exerting himself he sometimes has tightness/burning in his chest and SOB. Sometimes it feels painful to touch. No wheezing. He has a cough sometimes but relates this to severe abscess tooth and sinus infection that required surgical intervention. He states he felt better after the steroids taper. Never had repeat imaing. REVIEW OF SYSTEMS: 10 point review of systems is negative except as reported in HPI. PHYSICAL EXAM: Body mass index is 28.59 kg/m². Vitals:    09/27/22 1616   BP: 120/80   Pulse: 70   Resp: 20   Temp: 98 °F (36.7 °C)   TempSrc: Temporal   SpO2: 98%   Weight: 188 lb (85.3 kg)   Height: 5' 8\" (1.727 m)         General:   Alert, cooperative, no distress, appears stated age. Eyes:   Conjunctivae/corneas clear. PERRL        Mouth/Throat:  Lips, mucosa, and tongue normal. Teeth and gums normal.        Lungs:     Mild R sided exp wheeze. Heart:   Regular rate and rhythm, S1, S2 normal, no murmur, click, rub or gallop. Abdomen:    Soft, non-tender. Extremities:  Extremities normal, atraumatic, no cyanosis or edema.      Skin:  Skin color normal. No rashes or lesions     Neurologic:  A&Ox3     DIAGNOSTIC TESTS:                                                                                    LABS:   Lab Results   Component Value Date/Time    WBC 14.7 03/07/2021 04:27 AM    HGB 14.6 03/07/2021 04:27 AM    HCT 42.9 03/07/2021 04:27 AM     03/07/2021 04:27 AM     Imaging: I performed an independent interpretation of the patient's images. CXR:   XR CHEST STANDARD TWO VW 04/20/2021    Narrative  Chest X-ray    INDICATION: Cough and shortness of breath    PA and lateral views of the chest were obtained. FINDINGS: The right diaphragm remains elevated. There is no developing focal  infiltrate or effusion. The heart size is normal.  The bony thorax is intact. Impression  Chronic elevation of the right diaphragm. No acute findings in the  chest    CT Chest:   CT CHEST PULMONARY EMBOLISM W CONTRAST 05/03/2021    Narrative  HISTORY: History of COVID-19 with continued shortness of breath and chest pain    Exam: CT chest, PE protocol    Technique: Thin section axial CT images are obtained from the thoracic inlet  through the upper abdomen. Coronal reformatted images are obtained based on the  axial data. 100 cc Isovue 370 is administered intraveneously without incident. Radiation dose reduction techniques were used for this study. Our CT scanners  use one or all of the following: Automated exposure control, adjustment of the  mA and/or kV according to patient size, use of iterative reconstruction. Comparison: 2/21/2021    Findings: There are no filling defects seen within the pulmonary vasculature to  suggest pulmonary embolus. There has been significant interval improvement in  the appearance of the lungs when compared with the prior exam, though some areas  of patchy groundglass opacity persists in the lower lobes. There is no  mediastinal, hilar, or axillary lymphadenopathy seen. No suspicious pulmonary  nodules.  No pleural or pericardial effusions. Evaluation of the upper abdomen demonstrates no new abnormality. Bone window evaluation demonstrates no aggressive osseous lesions. Impression  S:    1. No evidence of pulmonary embolus. 2. Improved appearance of the lungs overall with some persistent patchy  groundglass opacity in the lower lobes. 5/3/21      Nuclear Medicine: No results found for this or any previous visit from the past 3650 days. PFTs:   No flowsheet data found. No results found for this or any previous visit. No results found for this or any previous visit. FeNO: No results found for this or any previous visit. FeNO and Likelihood of Eosinophilic Asthma   Unlikely Intermediate Likely   <25 ppb 25-50 ppb >50ppb   Exercise Oximetry:  Echo: No results found for this or any previous visit from the past 3650 days. [facility] Reference Info:                                                                                                                  Past Medical History:   Diagnosis Date    Chest discomfort 11/18/2015    Tightness, pressure     Chronic left maxillary sinusitis     Depression     Dyspnea 11/18/2015    Shortness of breath      GERD (gastroesophageal reflux disease)     controlled with meds     Hypoglycemia     Kidney stone     Mixed hyperlipidemia 11/18/2015        Tobacco Use      Smoking status: Never      Smokeless tobacco: Never    Allergies   Allergen Reactions    Iohexol Diarrhea     Other reaction(s): Abdominal pain-I  5/3/21 pt states abd issues after CT scan, not allergic. 5/3/21 pt tolerated iv contrast for CT on this date without premedication and was fine.  [facility]    Penicillin G Swelling     Current Outpatient Medications   Medication Instructions    EPINEPHrine (EPIPEN) 0.3 MG/0.3ML SOAJ injection INJECT INTO OUTER THIGH FOR SEVERE ALLERGIC REACTION CALL 911 AFTER USE    fluticasone-salmeterol (WIXELA INHUB) 250-50 MCG/ACT AEPB diskus inhaler 1 puff, Inhalation, EVERY 12 HOURS    gabapentin (NEURONTIN) 300 mg, Oral, 3 TIMES DAILY    LORazepam (ATIVAN) 1 mg, Oral, EVERY 8 HOURS PRN    tamsulosin (FLOMAX) 0.4 mg, Oral    tamsulosin (FLOMAX) 0.4 mg, Oral, DAILY

## 2022-10-11 ENCOUNTER — HOSPITAL ENCOUNTER (OUTPATIENT)
Dept: CT IMAGING | Age: 62
Discharge: HOME | End: 2022-10-14
Payer: COMMERCIAL

## 2022-10-11 ENCOUNTER — TELEPHONE (OUTPATIENT)
Dept: PULMONOLOGY | Age: 62
End: 2022-10-11

## 2022-10-11 DIAGNOSIS — R05.3 POST-COVID CHRONIC COUGH: ICD-10-CM

## 2022-10-11 DIAGNOSIS — U09.9 POST-COVID CHRONIC COUGH: ICD-10-CM

## 2022-10-11 PROCEDURE — 71250 CT THORAX DX C-: CPT

## 2022-10-11 NOTE — TELEPHONE ENCOUNTER
Patient has been on Mychart and seen results of CT. He is confused by that report and states he is still having difficulty breathing and wheezing. Please call to discuss and also ask Dr. Shae Thomas if he needs to be on a steroid?     He can be reached at [phone]

## 2023-05-02 ENCOUNTER — OFFICE VISIT (OUTPATIENT)
Dept: ENT CLINIC | Age: 63
End: 2023-05-02
Payer: COMMERCIAL

## 2023-05-02 VITALS
HEIGHT: 68 IN | SYSTOLIC BLOOD PRESSURE: 118 MMHG | BODY MASS INDEX: 27.74 KG/M2 | DIASTOLIC BLOOD PRESSURE: 82 MMHG | WEIGHT: 183 LBS

## 2023-05-02 DIAGNOSIS — Q18.1 CYST OF EAR CANAL: Primary | ICD-10-CM

## 2023-05-02 DIAGNOSIS — H60.391 OTHER INFECTIVE ACUTE OTITIS EXTERNA OF RIGHT EAR: ICD-10-CM

## 2023-05-02 DIAGNOSIS — H90.11 CONDUCTIVE HEARING LOSS OF RIGHT EAR WITH UNRESTRICTED HEARING OF LEFT EAR: ICD-10-CM

## 2023-05-02 PROCEDURE — 99203 OFFICE O/P NEW LOW 30 MIN: CPT | Performed by: PHYSICIAN ASSISTANT

## 2023-05-02 RX ORDER — AZELASTINE HYDROCHLORIDE 137 UG/1
1 SPRAY, METERED NASAL 2 TIMES DAILY
Qty: 1 EACH | Refills: 2 | Status: SHIPPED | OUTPATIENT
Start: 2023-05-02

## 2023-05-02 ASSESSMENT — ENCOUNTER SYMPTOMS
COUGH: 0
ABDOMINAL PAIN: 0
EYE DISCHARGE: 0

## 2023-05-02 NOTE — PROGRESS NOTES
Tammy Vu is a 63 y.o. male presents today with c/o right ear plugging. In March he had otitis media with pus filled middle ear cavity started on Z-Barry and steroid. He did a little bit better but on Friday his ear began to hurt again went to urgent care was told he had a cyst in his ear canal and started on neomycin polymyxin drops which has helped but he still a little bit muffled on that side. The patient admits to being cystic or having the proclivity to develop cysts all over his body. He has a baseball sized one on his back several on his legs and his father was the same way. He has not been diagnosed with any particular disorder. In December 2020 Dr. Allison Grover performed a left FESS and has done pretty well over the interval.  He gets chronic sinus drainage and congestion that is particularly worse in the morning. Allergy testing was negative and he has tried multiple antihistamines and intranasal steroids however Singulair is the only one that has seemed to have helped. He had a 3-week hospital stay in 2021 with COVID and feels like his health has been less optimal ever since. He has never had an audiogram.    Chief Complaint   Patient presents with    Ear Problem     Patient here for ear issues. He states he was seen in urgent care and was given an ear drop. He states he has a lot of sinus issue.        Patient Active Problem List   Diagnosis    Dyspnea    Headache disorder    Neuropathic pain    Postural imbalance    COVID-19    Encounter for medication management    Kidney stones    Anxiety disorder    Dyslipidemia    Contusion of left knee    IBS (irritable bowel syndrome)    Acute gastritis    Mixed hyperlipidemia    Elevated alkaline phosphatase level    Testosterone deficiency    Chest discomfort    Fall    Memory loss or impairment    Acute respiratory failure due to severe acute respiratory syndrome coronavirus 2 (SARS-CoV-2) infection (HCC)    Gastroesophageal reflux disease without

## 2023-05-18 ENCOUNTER — NURSE ONLY (OUTPATIENT)
Dept: UROLOGY | Age: 63
End: 2023-05-18

## 2023-05-18 DIAGNOSIS — N40.0 BENIGN PROSTATIC HYPERPLASIA, UNSPECIFIED WHETHER LOWER URINARY TRACT SYMPTOMS PRESENT: ICD-10-CM

## 2023-05-18 LAB — PSA SERPL-MCNC: 1.8 NG/ML

## 2023-06-27 ENCOUNTER — TELEPHONE (OUTPATIENT)
Dept: UROLOGY | Age: 63
End: 2023-06-27

## 2023-06-27 NOTE — TELEPHONE ENCOUNTER
Pt called in to cancel his appointment scheduled 06/28/23 at 4PM due to him having to work. Rescheduled appt to 07/19/23.

## 2023-07-19 ENCOUNTER — OFFICE VISIT (OUTPATIENT)
Dept: UROLOGY | Age: 63
End: 2023-07-19
Payer: COMMERCIAL

## 2023-07-19 DIAGNOSIS — N40.0 BENIGN PROSTATIC HYPERPLASIA, UNSPECIFIED WHETHER LOWER URINARY TRACT SYMPTOMS PRESENT: ICD-10-CM

## 2023-07-19 DIAGNOSIS — N20.0 KIDNEY STONES: Primary | ICD-10-CM

## 2023-07-19 LAB
BILIRUBIN, URINE, POC: NEGATIVE
BLOOD URINE, POC: NORMAL
GLUCOSE URINE, POC: NEGATIVE
KETONES, URINE, POC: NEGATIVE
LEUKOCYTE ESTERASE, URINE, POC: NEGATIVE
NITRITE, URINE, POC: NEGATIVE
PH, URINE, POC: 5.5 (ref 4.6–8)
PROTEIN,URINE, POC: NEGATIVE
SPECIFIC GRAVITY, URINE, POC: 1.03 (ref 1–1.03)
URINALYSIS CLARITY, POC: NORMAL
URINALYSIS COLOR, POC: NORMAL
UROBILINOGEN, POC: NORMAL

## 2023-07-19 PROCEDURE — 99214 OFFICE O/P EST MOD 30 MIN: CPT | Performed by: UROLOGY

## 2023-07-19 PROCEDURE — 81003 URINALYSIS AUTO W/O SCOPE: CPT | Performed by: UROLOGY

## 2023-07-19 PROCEDURE — 74018 RADEX ABDOMEN 1 VIEW: CPT | Performed by: UROLOGY

## 2023-07-19 RX ORDER — PAROXETINE HYDROCHLORIDE 20 MG/1
20 TABLET, FILM COATED ORAL EVERY MORNING
Qty: 30 TABLET | Refills: 11 | COMMUNITY
Start: 2023-06-20 | End: 2024-06-19

## 2023-07-19 RX ORDER — TAMSULOSIN HYDROCHLORIDE 0.4 MG/1
0.4 CAPSULE ORAL
Qty: 90 CAPSULE | Refills: 3 | Status: SHIPPED | OUTPATIENT
Start: 2023-07-19

## 2023-07-19 ASSESSMENT — ENCOUNTER SYMPTOMS: NAUSEA: 0

## 2023-07-19 NOTE — PROGRESS NOTES
[facility]  [facility]    [address]  [address] Pasha Capps  : 1960    Chief Complaint   Patient presents with    Follow-up          HPI     Raya Perez is a 63 y.o. male with a PMH of Bosniak 2F cysts followed since  without change, kidney stones, BPH/LUTS and chronic prostatitis who returns for follow up. Seen 2022 by me. He reports a long urologic history dating back to  for complex renal cysts and [facility]. Previously, he complained of bothersome urgency, frequency, dysuria. He has had cystoscopy X 3 (last one in 2019) which showed BPH but no stricture or other abnormality. He has been on flomax + finasteride in the past but more recently has done well with just flomax PRN. Today, he reports recently starting paxil for depression and has had issues with weaker stream, urinary retention. He has never required neville but is concerned because paxil increases difficulty with urination. Still using flomax PRN. He denies hematuria, urinary retention, urinary incontinence or other symptoms/concerns. Has not passed any stones recently. PVR: 21 cc     KUB: L non-obstructing small lower pole and upper pole kidney stones, multiple. NO ureteral stones identified.      Lab Results   Component Value Date    PSA 1.8 2023    PSA 1.8 2022             Past Medical History:   Diagnosis Date    Chest discomfort 2015    Tightness, pressure     Chronic left maxillary sinusitis     Depression     Dyspnea 2015    Shortness of breath      GERD (gastroesophageal reflux disease)     controlled with meds     Hypoglycemia     Kidney stone     Mixed hyperlipidemia 2015     Past Surgical History:   Procedure Laterality Date    COLONOSCOPY      HEENT Left 12/10/2020    FESS w/ L max antrostomy/ant ethmoidectomy- Barbara Certain     Current Outpatient

## 2023-07-20 ASSESSMENT — ENCOUNTER SYMPTOMS
VOMITING: 0
DIARRHEA: 0
SKIN LESIONS: 0
SHORTNESS OF BREATH: 0
BACK PAIN: 0
EYE DISCHARGE: 0
INDIGESTION: 0
EYE PAIN: 0
HEARTBURN: 0
ABDOMINAL PAIN: 0
BLOOD IN STOOL: 0
WHEEZING: 0
COUGH: 0
CONSTIPATION: 0

## 2024-01-09 NOTE — PROGRESS NOTES
Name:  Juan J Trinh  YOB: 1960   MRN: [id number]      Office Visit: 1/15/2024        ASSESSMENT AND PLAN:  (Medical Decision Making)    Impression: 63 y.o. male with persistent dyspnea on exertion since COVID in 2021.  Has had normal complete PFTs and normal CT chest without fibrosis.    1. Post-COVID chronic dyspnea  --He was doing better on Wixela, but ran out of this about 6 months ago.  I want him to resume it.  He is also to continue on his exercise program and weight loss efforts.  Which focused exercise on core muscle strengthening.  - Spirometry Without Bronchodilator    2. Post-COVID chronic cough  --resolved.    3. Lung nodules  --several small nodules, largest 5 mm.  Needs follow up scan (due 10/2023).  If nodules are stable, will not need any further follow up for the nodules.  - CT CHEST WO CONTRAST; Future    Orders Placed This Encounter   Medications    fluticasone-salmeterol (WIXELA INHUB) 250-50 MCG/ACT AEPB diskus inhaler     Sig: Inhale 1 puff into the lungs in the morning and 1 puff in the evening.     Dispense:  1 each     Refill:  11     No orders of the defined types were placed in this encounter.    Follow-up and Dispositions    Return in about 6 months (around 7/15/2024) for [place] or [place]/post covid dyspnea.     Collaborating physician is Dr. Alina Sparks.    ADS    Su France, APRN - CNP    [place]=pulmonary team    _________________________________________________________________________    HISTORY OF PRESENT ILLNESS:    Mr. Juan J Trinh is a 63 y.o. male who is seen at [facility] for  Shortness of Breath     The patient is a 63-year-old male who is seen for follow-up of COPD and post-COVID chronic cough and dyspnea.  He had COVID in February 2021.  His post illness CT scan revealed some ongoing groundglass opacities.  He was treated with steroids for 2 months.  Had some initial improvement but had ongoing wheezing and cough at last

## 2024-01-15 ENCOUNTER — OFFICE VISIT (OUTPATIENT)
Dept: PULMONOLOGY | Age: 64
End: 2024-01-15
Payer: COMMERCIAL

## 2024-01-15 VITALS
BODY MASS INDEX: 28.08 KG/M2 | SYSTOLIC BLOOD PRESSURE: 126 MMHG | DIASTOLIC BLOOD PRESSURE: 76 MMHG | RESPIRATION RATE: 18 BRPM | HEIGHT: 67 IN | HEART RATE: 78 BPM | OXYGEN SATURATION: 97 % | TEMPERATURE: 97.2 F | WEIGHT: 178.9 LBS

## 2024-01-15 DIAGNOSIS — R91.8 LUNG NODULES: ICD-10-CM

## 2024-01-15 DIAGNOSIS — U09.9 POST-COVID CHRONIC COUGH: ICD-10-CM

## 2024-01-15 DIAGNOSIS — U09.9 POST-COVID CHRONIC DYSPNEA: Primary | ICD-10-CM

## 2024-01-15 DIAGNOSIS — R05.3 POST-COVID CHRONIC COUGH: ICD-10-CM

## 2024-01-15 DIAGNOSIS — R06.09 POST-COVID CHRONIC DYSPNEA: Primary | ICD-10-CM

## 2024-01-15 LAB
EXPIRATORY TIME: NORMAL
FEF 25-75% %PRED-PRE: NORMAL
FEF 25-75% PRED: NORMAL
FEF 25-75-PRE: NORMAL
FEV1 %PRED-PRE: 105 %
FEV1 PRED: NORMAL
FEV1/FVC %PRED-PRE: NORMAL
FEV1/FVC PRED: NORMAL
FEV1/FVC: 86 %
FEV1: 3.25 L
FVC %PRED-PRE: 92 %
FVC PRED: NORMAL
FVC: 3.91 L
PEF %PRED-PRE: NORMAL
PEF PRED: NORMAL
PEF-PRE: NORMAL

## 2024-01-15 PROCEDURE — 99214 OFFICE O/P EST MOD 30 MIN: CPT | Performed by: NURSE PRACTITIONER

## 2024-01-15 RX ORDER — FLUTICASONE PROPIONATE AND SALMETEROL 250; 50 UG/1; UG/1
1 POWDER RESPIRATORY (INHALATION) EVERY 12 HOURS
Qty: 1 EACH | Refills: 11 | Status: SHIPPED | OUTPATIENT
Start: 2024-01-15

## 2024-01-15 ASSESSMENT — PULMONARY FUNCTION TESTS
FEV1_PERCENT_PREDICTED_PRE: 105
FVC_PERCENT_PREDICTED_PRE: 92
FVC: 3.91
FEV1: 3.25
FEV1/FVC: 86

## 2024-01-15 NOTE — PATIENT INSTRUCTIONS
I have ordered CT of chest.  You should receive a call from scheduling within 2-3 business days.  If you do not hear from them, please call [phone] to schedule your test.

## 2024-01-19 ENCOUNTER — HOSPITAL ENCOUNTER (OUTPATIENT)
Dept: CT IMAGING | Age: 64
Discharge: HOME | End: 2024-01-19
Payer: COMMERCIAL

## 2024-01-19 DIAGNOSIS — R91.8 LUNG NODULES: ICD-10-CM

## 2024-01-19 PROCEDURE — 71250 CT THORAX DX C-: CPT

## 2024-01-23 NOTE — RESULT ENCOUNTER NOTE
Please let patient know that CT does not show any scarring from Covid.  There are mild emphysema changes only.  No worrisome findings.

## 2024-04-15 ENCOUNTER — OFFICE VISIT (OUTPATIENT)
Dept: ENT CLINIC | Age: 64
End: 2024-04-15
Payer: COMMERCIAL

## 2024-04-15 VITALS
WEIGHT: 184 LBS | BODY MASS INDEX: 28.88 KG/M2 | DIASTOLIC BLOOD PRESSURE: 74 MMHG | SYSTOLIC BLOOD PRESSURE: 122 MMHG | HEIGHT: 67 IN

## 2024-04-15 DIAGNOSIS — H90.11 CONDUCTIVE HEARING LOSS OF RIGHT EAR WITH UNRESTRICTED HEARING OF LEFT EAR: ICD-10-CM

## 2024-04-15 DIAGNOSIS — H61.21 IMPACTED CERUMEN OF RIGHT EAR: ICD-10-CM

## 2024-04-15 DIAGNOSIS — Q18.1 CYST OF EAR CANAL: Primary | ICD-10-CM

## 2024-04-15 PROCEDURE — 99213 OFFICE O/P EST LOW 20 MIN: CPT | Performed by: PHYSICIAN ASSISTANT

## 2024-04-15 RX ORDER — LAMOTRIGINE 100 MG/1
100 TABLET ORAL DAILY
COMMUNITY

## 2024-04-15 ASSESSMENT — ENCOUNTER SYMPTOMS
RESPIRATORY NEGATIVE: 1
EYES NEGATIVE: 1
ALLERGIC/IMMUNOLOGIC NEGATIVE: 1
GASTROINTESTINAL NEGATIVE: 1

## 2024-04-15 NOTE — PROGRESS NOTES
Juan J Trinh is a 63 y.o. male presents today for recheck of cyst in the right ear. He feels hearing loss on that side and sticking sensation that clogs. He is not in pain and does not have drainage.     Chief Complaint   Patient presents with    Ear Problem     Cyst in right ear.  Patient states that a year ago, he had a sinus infection and he went to pcp and they were unable to see his eardrum.  Patient states that when he pushes his ear together, it feels like the ear is sticking together.  States his ear does not hurt but feels like someone has their finger in his ear.        Patient Active Problem List   Diagnosis    Dyspnea    Headache disorder    Neuropathic pain    Postural imbalance    COVID-19    Encounter for medication management    Kidney stones    Anxiety disorder    Dyslipidemia    Contusion of left knee    IBS (irritable bowel syndrome)    Acute gastritis    Mixed hyperlipidemia    Elevated alkaline phosphatase level    Testosterone deficiency    Chest discomfort    Fall    Memory loss or impairment    Acute respiratory failure due to severe acute respiratory syndrome coronavirus 2 (SARS-CoV-2) infection (HCC)    Gastroesophageal reflux disease without esophagitis    Internal hemorrhoids    Acute left-sided low back pain without sciatica    Abnormal ultrasound of liver    Chronic obstructive pulmonary disease, unspecified        Reviewed and updated this visit by provider:  Tobacco  Allergies  Meds  Problems  Med Hx  Surg Hx  Fam Hx         Review of Systems   Constitutional: Negative.    HENT: Negative.  Negative for ear discharge and ear pain.         Right ear cyst   Eyes: Negative.    Respiratory: Negative.     Cardiovascular: Negative.    Gastrointestinal: Negative.    Endocrine: Negative.    Genitourinary: Negative.    Musculoskeletal: Negative.    Skin: Negative.    Allergic/Immunologic: Negative.    Neurological: Negative.    Hematological: Negative.    Psychiatric/Behavioral:

## 2024-05-14 ENCOUNTER — OFFICE VISIT (OUTPATIENT)
Dept: ENT CLINIC | Age: 64
End: 2024-05-14
Payer: COMMERCIAL

## 2024-05-14 VITALS
HEIGHT: 67 IN | WEIGHT: 180 LBS | DIASTOLIC BLOOD PRESSURE: 70 MMHG | BODY MASS INDEX: 28.25 KG/M2 | SYSTOLIC BLOOD PRESSURE: 125 MMHG

## 2024-05-14 DIAGNOSIS — H61.21 IMPACTED CERUMEN OF RIGHT EAR: Chronic | ICD-10-CM

## 2024-05-14 DIAGNOSIS — Q18.1 CYST OF EAR CANAL: Primary | Chronic | ICD-10-CM

## 2024-05-14 DIAGNOSIS — H90.11 CONDUCTIVE HEARING LOSS OF RIGHT EAR WITH UNRESTRICTED HEARING OF LEFT EAR: Chronic | ICD-10-CM

## 2024-05-14 PROCEDURE — 99214 OFFICE O/P EST MOD 30 MIN: CPT | Performed by: PHYSICIAN ASSISTANT

## 2024-05-14 RX ORDER — CIPROFLOXACIN AND DEXAMETHASONE 3; 1 MG/ML; MG/ML
4 SUSPENSION/ DROPS AURICULAR (OTIC) 2 TIMES DAILY
Qty: 7.5 ML | Refills: 1 | Status: SHIPPED | OUTPATIENT
Start: 2024-05-14 | End: 2024-05-19

## 2024-05-14 ASSESSMENT — ENCOUNTER SYMPTOMS
EYES NEGATIVE: 1
RESPIRATORY NEGATIVE: 1
ALLERGIC/IMMUNOLOGIC NEGATIVE: 1
GASTROINTESTINAL NEGATIVE: 1

## 2024-05-14 NOTE — PROGRESS NOTES
well, by Dr. Gordon at today's visit. Ear wick was placed and will use ciprodex for the next week and rtc for wick removal and recheck. Hopefully compression will prevent re accumulation.         Return in about 6 days (around 5/20/2024) for Monday, R ear wick removal .    Patient agrees with this plan.        MARY Ramachandran    This note was generated using voice recognition software, please excuse any typos.

## 2024-05-15 RX ORDER — NEOMYCIN SULFATE, POLYMYXIN B SULFATE, HYDROCORTISONE 3.5; 10000; 1 MG/ML; [USP'U]/ML; MG/ML
4 SOLUTION/ DROPS AURICULAR (OTIC) 2 TIMES DAILY
Qty: 10 ML | Refills: 0 | Status: SHIPPED | OUTPATIENT
Start: 2024-05-15 | End: 2024-05-25

## 2024-05-20 ENCOUNTER — OFFICE VISIT (OUTPATIENT)
Dept: ENT CLINIC | Age: 64
End: 2024-05-20
Payer: COMMERCIAL

## 2024-05-20 VITALS — HEIGHT: 67 IN | BODY MASS INDEX: 27.94 KG/M2 | WEIGHT: 178 LBS | RESPIRATION RATE: 18 BRPM

## 2024-05-20 DIAGNOSIS — Q18.1 CYST OF EAR CANAL: Primary | ICD-10-CM

## 2024-05-20 DIAGNOSIS — H90.11 CONDUCTIVE HEARING LOSS OF RIGHT EAR WITH UNRESTRICTED HEARING OF LEFT EAR: ICD-10-CM

## 2024-05-20 PROCEDURE — 99213 OFFICE O/P EST LOW 20 MIN: CPT | Performed by: STUDENT IN AN ORGANIZED HEALTH CARE EDUCATION/TRAINING PROGRAM

## 2024-05-20 RX ORDER — DICLOFENAC POTASSIUM 50 MG/1
50 TABLET, FILM COATED ORAL 3 TIMES DAILY
COMMUNITY
Start: 2024-05-09 | End: 2025-05-09

## 2024-05-20 RX ORDER — PAROXETINE 10 MG/1
TABLET, FILM COATED ORAL
COMMUNITY
Start: 2024-05-09

## 2024-05-20 RX ORDER — OFLOXACIN 3 MG/ML
5 SOLUTION AURICULAR (OTIC) 2 TIMES DAILY
Qty: 10 ML | Refills: 0 | Status: SHIPPED | OUTPATIENT
Start: 2024-05-20 | End: 2024-05-25

## 2024-05-20 RX ORDER — LAMOTRIGINE 25 MG/1
TABLET ORAL
COMMUNITY
Start: 2024-03-24

## 2024-05-20 RX ORDER — FAMOTIDINE 20 MG/1
20 TABLET, FILM COATED ORAL PRN
COMMUNITY

## 2024-05-20 ASSESSMENT — ENCOUNTER SYMPTOMS
FACIAL SWELLING: 0
DIARRHEA: 0
SINUS PAIN: 0
APNEA: 0
COUGH: 0
SINUS PRESSURE: 0
WHEEZING: 0
EYE PAIN: 0
SHORTNESS OF BREATH: 0
STRIDOR: 0
NAUSEA: 0
EYE ITCHING: 0
CHOKING: 0
CONSTIPATION: 0
EYE DISCHARGE: 0

## 2024-05-20 NOTE — PROGRESS NOTES
HPI:  Juan J Trinh is a 63 y.o. male seen Established   Chief Complaint   Patient presents with    Follow-up     Patient presents today for right ear wick removal , he continues to have some unsteadiness and occasional pain . Patient did not  ear drops due to price .        63-year-old male seen for a follow-up evaluation history of right-sided ear canal cyst.  1 week ago he was being evaluated for worsening right-sided hearing loss found to have a large obstructing right ear canal cyst with no TM visualization.  This was decompressed via needle aspiration and ear wick was placed to compress with instructions to use eardrops with a 5-day follow-up.  Unfortunately the drops were rather expensive and he is not used any since his office evaluation.  He has had no further symptoms outside of clogged ear typical of ear wick.    Past Medical History, Past Surgical History, Family history, Social History, and Medications were all reviewed with the patient today and updated as necessary.     Allergies   Allergen Reactions    Iohexol Diarrhea     Other reaction(s): Abdominal pain-I    5/3/21 pt states abd issues after CT scan, not allergic.     5/3/21 pt tolerated iv contrast for CT on this date without premedication and was fine. [facility]    Other reaction(s): Abdominal pain-I   Other reaction(s): Abdominal pain-I   5/3/21 pt states abd issues after CT scan, not allergic.    5/3/21 pt tolerated iv contrast for CT on this date without premedication and was fine. [facility]    Other reaction(s): Abdominal pain-I   5/3/21 pt states abd issues after CT scan, not allergic.    5/3/21 pt tolerated iv contrast for CT on this date without premedication and was fine. [facility]   Other reaction(s): Abdominal pain-I   Other reaction(s): Abdominal pain-I   5/3/21 pt states abd issues after CT scan, not allergic.    5/3/21 pt tolerated iv contrast for CT on this date without premedication and was fine. [facility]   Other reaction(s): Abdominal

## 2024-07-14 NOTE — PROGRESS NOTES
sinus issues    Tinnitus     notice for years        Tobacco Use      Smoking status: Never      Smokeless tobacco: Never    Allergies   Allergen Reactions    Iohexol Diarrhea     Other reaction(s): Abdominal pain-I    5/3/21 pt states abd issues after CT scan, not allergic.     5/3/21 pt tolerated iv contrast for CT on this date without premedication and was fine. [facility]    Other reaction(s): Abdominal pain-I   Other reaction(s): Abdominal pain-I   5/3/21 pt states abd issues after CT scan, not allergic.    5/3/21 pt tolerated iv contrast for CT on this date without premedication and was fine. [facility]    Other reaction(s): Abdominal pain-I   5/3/21 pt states abd issues after CT scan, not allergic.    5/3/21 pt tolerated iv contrast for CT on this date without premedication and was fine. [facility]   Other reaction(s): Abdominal pain-I   Other reaction(s): Abdominal pain-I   5/3/21 pt states abd issues after CT scan, not allergic.    5/3/21 pt tolerated iv contrast for CT on this date without premedication and was fine. [facility]   Other reaction(s): Abdominal pain-I   5/3/21 pt states abd issues after CT scan, not allergic.    5/3/21 pt tolerated iv contrast for CT on this date without premedication and was fine. [facility]    Penicillin G Swelling    Penicillins Swelling     Current Outpatient Medications   Medication Instructions    diclofenac (CATAFLAM) 50 mg, 3 TIMES DAILY    EPINEPHrine (EPIPEN) 0.3 MG/0.3ML SOAJ injection PRN    famotidine (PEPCID) 20 mg, Oral, PRN    lamoTRIgine (LAMICTAL) 25 MG tablet TAKE 1 TAB DAILY X 14 DAYS    lamoTRIgine (LAMICTAL) 100 mg, Oral, DAILY    LORazepam (ATIVAN) 1 mg, Oral, EVERY 8 HOURS PRN    PARoxetine (PAXIL) 10 MG tablet TAKE 1 TABLET (10 MG TOTAL) BY MOUTH EVERY MORNING.    PARoxetine (PAXIL) 20 mg, Oral, EVERY MORNING    tamsulosin (FLOMAX) 0.4 mg, Oral, EVERY BEDTIME

## 2024-07-15 ENCOUNTER — OFFICE VISIT (OUTPATIENT)
Dept: PULMONOLOGY | Age: 64
End: 2024-07-15
Payer: COMMERCIAL

## 2024-07-15 VITALS
WEIGHT: 183.5 LBS | OXYGEN SATURATION: 97 % | DIASTOLIC BLOOD PRESSURE: 68 MMHG | HEIGHT: 67 IN | HEART RATE: 69 BPM | RESPIRATION RATE: 18 BRPM | BODY MASS INDEX: 28.8 KG/M2 | TEMPERATURE: 97.7 F | SYSTOLIC BLOOD PRESSURE: 116 MMHG

## 2024-07-15 DIAGNOSIS — U09.9 POST-COVID CHRONIC DYSPNEA: Primary | ICD-10-CM

## 2024-07-15 DIAGNOSIS — J43.2 CENTRILOBULAR EMPHYSEMA (HCC): ICD-10-CM

## 2024-07-15 DIAGNOSIS — R06.09 POST-COVID CHRONIC DYSPNEA: Primary | ICD-10-CM

## 2024-07-15 PROBLEM — U07.1 ACUTE RESPIRATORY FAILURE DUE TO SEVERE ACUTE RESPIRATORY SYNDROME CORONAVIRUS 2 (SARS-COV-2) INFECTION (HCC): Status: RESOLVED | Noted: 2021-02-21 | Resolved: 2024-07-15

## 2024-07-15 PROBLEM — J96.00 ACUTE RESPIRATORY FAILURE DUE TO SEVERE ACUTE RESPIRATORY SYNDROME CORONAVIRUS 2 (SARS-COV-2) INFECTION (HCC): Status: RESOLVED | Noted: 2021-02-21 | Resolved: 2024-07-15

## 2024-07-15 PROCEDURE — 99213 OFFICE O/P EST LOW 20 MIN: CPT | Performed by: NURSE PRACTITIONER

## 2024-08-12 ENCOUNTER — HOSPITAL ENCOUNTER (EMERGENCY)
Age: 64
Discharge: HOME | End: 2024-08-13
Attending: STUDENT IN AN ORGANIZED HEALTH CARE EDUCATION/TRAINING PROGRAM
Payer: COMMERCIAL

## 2024-08-12 DIAGNOSIS — R45.851 SUICIDAL IDEATION: Primary | ICD-10-CM

## 2024-08-12 LAB
ALBUMIN SERPL-MCNC: 3.9 G/DL (ref 3.2–4.6)
ALBUMIN/GLOB SERPL: 1.6 (ref 1–1.9)
ALP SERPL-CCNC: 136 U/L (ref 40–129)
ALT SERPL-CCNC: 15 U/L (ref 12–65)
AMPHET UR QL SCN: NEGATIVE
ANION GAP SERPL CALC-SCNC: 11 MMOL/L (ref 9–18)
APAP SERPL-MCNC: <5 UG/ML (ref 10–30)
AST SERPL-CCNC: 17 U/L (ref 15–37)
BARBITURATES UR QL SCN: NEGATIVE
BASOPHILS # BLD: 0 K/UL (ref 0–0.2)
BASOPHILS NFR BLD: 0 % (ref 0–2)
BENZODIAZ UR QL: POSITIVE
BILIRUB SERPL-MCNC: 0.4 MG/DL (ref 0–1.2)
BUN SERPL-MCNC: 13 MG/DL (ref 8–23)
CALCIUM SERPL-MCNC: 9 MG/DL (ref 8.8–10.2)
CANNABINOIDS UR QL SCN: NEGATIVE
CHLORIDE SERPL-SCNC: 110 MMOL/L (ref 98–107)
CO2 SERPL-SCNC: 24 MMOL/L (ref 20–28)
COCAINE UR QL SCN: NEGATIVE
CREAT SERPL-MCNC: 1.14 MG/DL (ref 0.8–1.3)
DIFFERENTIAL METHOD BLD: ABNORMAL
EOSINOPHIL # BLD: 0 K/UL (ref 0–0.8)
EOSINOPHIL NFR BLD: 0 % (ref 0.5–7.8)
ERYTHROCYTE [DISTWIDTH] IN BLOOD BY AUTOMATED COUNT: 11.9 % (ref 11.9–14.6)
GLOBULIN SER CALC-MCNC: 2.4 G/DL (ref 2.3–3.5)
GLUCOSE SERPL-MCNC: 89 MG/DL (ref 70–99)
HCT VFR BLD AUTO: 49.7 % (ref 41.1–50.3)
HGB BLD-MCNC: 16.5 G/DL (ref 13.6–17.2)
IMM GRANULOCYTES # BLD AUTO: 0 K/UL (ref 0–0.5)
IMM GRANULOCYTES NFR BLD AUTO: 0 % (ref 0–5)
LYMPHOCYTES # BLD: 1.4 K/UL (ref 0.5–4.6)
LYMPHOCYTES NFR BLD: 21 % (ref 13–44)
MCH RBC QN AUTO: 30.7 PG (ref 26.1–32.9)
MCHC RBC AUTO-ENTMCNC: 33.2 G/DL (ref 31.4–35)
MCV RBC AUTO: 92.4 FL (ref 82–102)
METHADONE UR QL: NEGATIVE
MONOCYTES # BLD: 0.5 K/UL (ref 0.1–1.3)
MONOCYTES NFR BLD: 8 % (ref 4–12)
NEUTS SEG # BLD: 4.7 K/UL (ref 1.7–8.2)
NEUTS SEG NFR BLD: 71 % (ref 43–78)
NRBC # BLD: 0 K/UL (ref 0–0.2)
OPIATES UR QL: NEGATIVE
PCP UR QL: NEGATIVE
PLATELET # BLD AUTO: 261 K/UL (ref 150–450)
PMV BLD AUTO: 9.9 FL (ref 9.4–12.3)
POTASSIUM SERPL-SCNC: 4 MMOL/L (ref 3.5–5.1)
PROT SERPL-MCNC: 6.3 G/DL (ref 6.3–8.2)
RBC # BLD AUTO: 5.38 M/UL (ref 4.23–5.6)
SALICYLATES SERPL-MCNC: <0.5 MG/DL
SODIUM SERPL-SCNC: 144 MMOL/L (ref 136–145)
WBC # BLD AUTO: 6.7 K/UL (ref 4.3–11.1)

## 2024-08-12 PROCEDURE — 80175 DRUG SCREEN QUAN LAMOTRIGINE: CPT

## 2024-08-12 PROCEDURE — 99285 EMERGENCY DEPT VISIT HI MDM: CPT

## 2024-08-12 PROCEDURE — 80053 COMPREHEN METABOLIC PANEL: CPT

## 2024-08-12 PROCEDURE — 80307 DRUG TEST PRSMV CHEM ANLYZR: CPT

## 2024-08-12 PROCEDURE — 85025 COMPLETE CBC W/AUTO DIFF WBC: CPT

## 2024-08-12 PROCEDURE — 80179 DRUG ASSAY SALICYLATE: CPT

## 2024-08-12 PROCEDURE — 6370000000 HC RX 637 (ALT 250 FOR IP): Performed by: STUDENT IN AN ORGANIZED HEALTH CARE EDUCATION/TRAINING PROGRAM

## 2024-08-12 PROCEDURE — 80143 DRUG ASSAY ACETAMINOPHEN: CPT

## 2024-08-12 RX ORDER — LORAZEPAM 1 MG/1
2 TABLET ORAL EVERY 6 HOURS PRN
Status: DISCONTINUED | OUTPATIENT
Start: 2024-08-12 | End: 2024-08-13 | Stop reason: HOSPADM

## 2024-08-12 RX ADMIN — LORAZEPAM 2 MG: 1 TABLET ORAL at 19:53

## 2024-08-12 RX ADMIN — LAMOTRIGINE 150 MG: 25 TABLET ORAL at 19:53

## 2024-08-12 ASSESSMENT — LIFESTYLE VARIABLES
HOW OFTEN DO YOU HAVE A DRINK CONTAINING ALCOHOL: NEVER
HOW MANY STANDARD DRINKS CONTAINING ALCOHOL DO YOU HAVE ON A TYPICAL DAY: PATIENT DOES NOT DRINK

## 2024-08-12 NOTE — CARE COORDINATION
Chart review complete, CM met with pt at bedside, pt found laying on stretcher alert and oriented x4, pt states he lives alone in own house, states his children are staying with him but moving out. Pt states he has a psych md who he saw 6 weeks ago and she placed him on medications that made him feel bad so he called and was told to stop taking them. Pt states he saw his counselor today and was instructed to come to the ED for evaluation, pt c/o SI with plans to OD on meds, shoot self or jump off bridge. Pt also c/o HI. Pt became tearful during conversation at bedside. Pt states he has been complaint with all his psych medications.  Demographics, insurance and PCP confirmed with pt.    Pt is pending Psych evaluation at this time.    CM will remain available to assist as needed with dc needs.       08/12/24 3537   Service Assessment   Patient Orientation Alert and Oriented   Cognition Alert   History Provided By Patient   Primary Caregiver Self   Accompanied By/Relationship none   Support Systems Children   Patient's Healthcare Decision Maker is: Legal Next of Kin   PCP Verified by CM Yes   Last Visit to PCP Within last 6 months   Prior Functional Level Independent in ADLs/IADLs   Current Functional Level Independent in ADLs/IADLs   Can patient return to prior living arrangement Yes   Ability to make needs known: Good   Family able to assist with home care needs: Yes   Would you like for me to discuss the discharge plan with any other family members/significant others, and if so, who? Yes   Financial Resources Other (Comment)  ([facility])   Community Resources Psychiatric Treatment   CM/SW Referral Psychiatry   Social/Functional History   Lives With Alone   Type of Home House   Home Layout One level   Home Access Level entry   Bathroom Shower/Tub Tub/Shower unit   Bathroom Toilet Standard   Bathroom Equipment None   Bathroom Accessibility Accessible   Home Equipment None   Receives Help From Family   ADL Assistance

## 2024-08-12 NOTE — ED TRIAGE NOTES
Pt ambulatory unassisted to triage. Pt states he is \"rapid cycling\" bipolar and was instructed by his counselor to come to ED. Complains of having both SI and HI.  Reports stopping his Calypta x3 days ago at the direction of his psychiatrist. Reports prior psychiatric hospitalizations at [facility] in 2007.

## 2024-08-12 NOTE — ED PROVIDER NOTES
Emergency Department Provider Note       PCP: Tevin Crawford Jr., MD   Age: 63 y.o.   Sex: male     DISPOSITION Behavioral Health Hold 08/12/2024 08:35:50 PM       ICD-10-CM    1. Suicidal ideation  R45.851           Medical Decision Making     63-year-old male patient with longstanding history of mental health disorder presenting to this department with acute suicidality with plan to obtain a gun to end his life.  States he has been \"spiraling downward\" recently after discontinuing medication prescribed by his psychiatrist.  He discontinue use of this medication at the advisement of his provider.  He states he was doing little to treat his symptoms otherwise however.  Will obtain basic labs and asked that psychiatry speak with patient to provide medication recommendations     1 or more acute illnesses that pose a threat to life or bodily function.   1 or more chronic illnesses with a severe exacerbation or progression.  Drug therapy given requiring intensive monitoring for toxicity.  Discussion with external consultants.  Chronic medical problems impacting care include BPD.  Shared medical decision making was utilized in creating the patients health plan today.    I independently ordered and reviewed each unique test.  I reviewed external records: ED visit note from an outside group.  I reviewed external records: provider visit note from PCP.  I reviewed external records: provider visit note from outside specialist.         The management of this patient was discussed with an external consultant.            History     63-year-old male patient with a history of mental health issues presents to this department with reports of depression, feelings of suicidal ideation.  Patient is given thought to obtaining gun and killing himself.  He states he has been spiraling downward for several weeks.  Recently started on a new medication that made his symptoms worse thus he has been weaned off of this medication by his

## 2024-08-12 NOTE — ED NOTES
Patient resting at this time. No signs or symptoms of distress. Respirations even and unlabored. Sitter at bedside. Safety precautions in place.      Brien Menon RN  08/12/24 9101

## 2024-08-12 NOTE — ED NOTES
Constant Observer Yes - Name: dallin Rincon Observer Oriented yes   High risk patients are in line of sight at all times Yes   Excess equipment/medical supplies not necessary for the care of the patient removed Yes   All sharp or dangerous objects are removed from room: including but not limited to belts, pens & pencils, needles, medications, cosmetics, lighters, matches, nail files, watches, necklaces, glass objects, razors, razor blades, knives, aerosol sprays, drawstring pants, shoes, cords (telephone, call bells, etc.) cleaning wipes or other cleaning items, aluminum cans, not permanently attached wall décor Yes   Telephone/cell phone removed as well as TV remote (batteries can be swallowed) Yes   Patient belongings removed and labeled at nurses station Yes   Excess linen is removed from room Yes   All plastic bags are removed from the room and replaced with paper trash bags Yes   Patient is in paper scrubs or appropriate gown and using hospital socks with rubber soles Yes   No metal, hard eating utensils or hard plates are on meal tray Yes   Remove all cleaning agents used by Environmental Services Yes   If Crucifix is hanging on a nail, remove Crucifix as well as the nail Yes       *If any question above is answered \"No,\" documentation is required.       Mariann Fabian RN  08/12/24 7033

## 2024-08-12 NOTE — ED NOTES
Constant Observer Yes - Name: Tad   Constant Observer Oriented yes   High risk patients are in line of sight at all times Yes   Excess equipment/medical supplies not necessary for the care of the patient removed Yes   All sharp or dangerous objects are removed from room: including but not limited to belts, pens & pencils, needles, medications, cosmetics, lighters, matches, nail files, watches, necklaces, glass objects, razors, razor blades, knives, aerosol sprays, drawstring pants, shoes, cords (telephone, call bells, etc.) cleaning wipes or other cleaning items, aluminum cans, not permanently attached wall décor Yes   Telephone/cell phone removed as well as TV remote (batteries can be swallowed) Yes   Patient belongings removed and labeled at nurses station Yes   Excess linen is removed from room Yes   All plastic bags are removed from the room and replaced with paper trash bags Yes   Patient is in paper scrubs or appropriate gown and using hospital socks with rubber soles Yes   No metal, hard eating utensils or hard plates are on meal tray Yes   Remove all cleaning agents used by Environmental Services Yes   If Crucifix is hanging on a nail, remove Crucifix as well as the nail Yes       *If any question above is answered \"No,\" documentation is required.       Brien Menon RN  08/12/24 9441

## 2024-08-13 VITALS
DIASTOLIC BLOOD PRESSURE: 86 MMHG | BODY MASS INDEX: 28.25 KG/M2 | TEMPERATURE: 97.8 F | HEIGHT: 67 IN | HEART RATE: 72 BPM | OXYGEN SATURATION: 99 % | RESPIRATION RATE: 20 BRPM | SYSTOLIC BLOOD PRESSURE: 120 MMHG | WEIGHT: 180 LBS

## 2024-08-13 PROCEDURE — 6370000000 HC RX 637 (ALT 250 FOR IP): Performed by: STUDENT IN AN ORGANIZED HEALTH CARE EDUCATION/TRAINING PROGRAM

## 2024-08-13 RX ADMIN — LORAZEPAM 2 MG: 1 TABLET ORAL at 08:24

## 2024-08-13 NOTE — CARE COORDINATION
CM received call from Alicia CRUZ with [facility] admission, pt has been accepted for admission by Dr Carmona to unit 2, pt will be transport via EMS to facility. Pt, primary RN Renato updated, Dr Tinajero to complete dc for pt to go .

## 2024-08-13 NOTE — ED NOTES
Patient resting at this time. Respirations are unlabored and even. No signs of distress noted. Safety precautions in place. Sitter at bedside.      Glen Reed RN  08/13/24 3342

## 2024-08-13 NOTE — ED NOTES
TRANSFER - OUT REPORT:    Verbal report given to Roxanna Santamaria RN on Juan J Trinh  being transferred to [facility] for routine progression of patient care       Report consisted of patient's Situation, Background, Assessment and   Recommendations(SBAR).     Information from the following report(s) ED Encounter Summary was reviewed with the receiving nurse.    Royston Fall Assessment:    Presents to emergency department  because of falls (Syncope, seizure, or loss of consciousness): No  Age > 70: No  Altered Mental Status, Intoxication with alcohol or substance confusion (Disorientation, impaired judgment, poor safety awaremess, or inability to follow instructions): No  Impaired Mobility: Ambulates or transfers with assistive devices or assistance; Unable to ambulate or transer.: No  Nursing Judgement: No          Lines:       Opportunity for questions and clarification was provided.      Patient transported with:  Glen Shah RN  08/13/24 5303

## 2024-08-13 NOTE — ED NOTES
Patient resting at this time. Respirations are unlabored and even. No signs of distress noted. Safety precautions in place. Sitter at bedside.      Glen Reed RN  08/13/24 3963

## 2024-08-13 NOTE — ED NOTES
Patient resting at this time. Respirations are unlabored and even. No signs of distress noted. Safety precautions in place. Sitter at bedside.      Glen Reed RN  08/13/24 6632

## 2024-08-13 NOTE — ED NOTES
Patient resting at this time. Respirations are unlabored and even. No signs of distress noted. Safety precautions in place.       Glen Reed RN  08/13/24 3773

## 2024-08-14 LAB — LAMOTRIGINE SERPL-MCNC: 4.8 UG/ML (ref 2–20)

## 2025-04-24 ENCOUNTER — APPOINTMENT (OUTPATIENT)
Dept: CT IMAGING | Age: 65
End: 2025-04-24
Payer: COMMERCIAL

## 2025-04-24 ENCOUNTER — HOSPITAL ENCOUNTER (EMERGENCY)
Age: 65
Discharge: HOME | End: 2025-04-24
Attending: EMERGENCY MEDICINE
Payer: COMMERCIAL

## 2025-04-24 VITALS
DIASTOLIC BLOOD PRESSURE: 92 MMHG | TEMPERATURE: 98.1 F | RESPIRATION RATE: 16 BRPM | BODY MASS INDEX: 28.25 KG/M2 | OXYGEN SATURATION: 100 % | HEART RATE: 68 BPM | SYSTOLIC BLOOD PRESSURE: 126 MMHG | HEIGHT: 67 IN | WEIGHT: 180 LBS

## 2025-04-24 DIAGNOSIS — R31.9 URINARY TRACT INFECTION WITH HEMATURIA, SITE UNSPECIFIED: ICD-10-CM

## 2025-04-24 DIAGNOSIS — N39.0 URINARY TRACT INFECTION WITH HEMATURIA, SITE UNSPECIFIED: ICD-10-CM

## 2025-04-24 DIAGNOSIS — N23 RENAL COLIC: Primary | ICD-10-CM

## 2025-04-24 LAB
ALBUMIN SERPL-MCNC: 3.8 G/DL (ref 3.2–4.6)
ALBUMIN/GLOB SERPL: 1.3 (ref 1–1.9)
ALP SERPL-CCNC: 160 U/L (ref 40–129)
ALT SERPL-CCNC: 15 U/L (ref 8–55)
ANION GAP SERPL CALC-SCNC: 12 MMOL/L (ref 7–16)
APPEARANCE UR: ABNORMAL
AST SERPL-CCNC: 25 U/L (ref 15–37)
BACTERIA URNS QL MICRO: ABNORMAL /HPF
BASOPHILS # BLD: 0.02 K/UL (ref 0–0.2)
BASOPHILS NFR BLD: 0.2 % (ref 0–2)
BILIRUB SERPL-MCNC: 0.5 MG/DL (ref 0–1.2)
BILIRUB UR QL: ABNORMAL
BILIRUB UR QL: NEGATIVE
BUN SERPL-MCNC: 11 MG/DL (ref 8–23)
CALCIUM SERPL-MCNC: 9.1 MG/DL (ref 8.8–10.2)
CHLORIDE SERPL-SCNC: 108 MMOL/L (ref 98–107)
CO2 SERPL-SCNC: 20 MMOL/L (ref 20–29)
COLOR UR: ABNORMAL
CREAT SERPL-MCNC: 1.33 MG/DL (ref 0.8–1.3)
DIFFERENTIAL METHOD BLD: ABNORMAL
EOSINOPHIL # BLD: 0 K/UL (ref 0–0.8)
EOSINOPHIL NFR BLD: 0 % (ref 0.5–7.8)
EPI CELLS #/AREA URNS HPF: ABNORMAL /HPF
ERYTHROCYTE [DISTWIDTH] IN BLOOD BY AUTOMATED COUNT: 11.9 % (ref 11.9–14.6)
GLOBULIN SER CALC-MCNC: 3 G/DL (ref 2.3–3.5)
GLUCOSE SERPL-MCNC: 102 MG/DL (ref 70–99)
GLUCOSE UR QL STRIP.AUTO: NEGATIVE MG/DL
GLUCOSE UR STRIP.AUTO-MCNC: NEGATIVE MG/DL
HCT VFR BLD AUTO: 48.3 % (ref 41.1–50.3)
HGB BLD-MCNC: 17.1 G/DL (ref 13.6–17.2)
HGB UR QL STRIP: ABNORMAL
IMM GRANULOCYTES # BLD AUTO: 0.06 K/UL (ref 0–0.5)
IMM GRANULOCYTES NFR BLD AUTO: 0.5 % (ref 0–5)
KETONES UR QL STRIP.AUTO: ABNORMAL MG/DL
KETONES UR-MCNC: 15 MG/DL
LEUKOCYTE ESTERASE UR QL STRIP.AUTO: ABNORMAL
LEUKOCYTE ESTERASE UR QL STRIP: NEGATIVE
LIPASE SERPL-CCNC: 22 U/L (ref 13–60)
LYMPHOCYTES # BLD: 1.19 K/UL (ref 0.5–4.6)
LYMPHOCYTES NFR BLD: 9.5 % (ref 13–44)
MCH RBC QN AUTO: 30.5 PG (ref 26.1–32.9)
MCHC RBC AUTO-ENTMCNC: 35.4 G/DL (ref 31.4–35)
MCV RBC AUTO: 86.3 FL (ref 82–102)
MONOCYTES # BLD: 0.55 K/UL (ref 0.1–1.3)
MONOCYTES NFR BLD: 4.4 % (ref 4–12)
NEUTS SEG # BLD: 10.7 K/UL (ref 1.7–8.2)
NEUTS SEG NFR BLD: 85.4 % (ref 43–78)
NITRITE UR QL STRIP.AUTO: NEGATIVE
NITRITE UR QL: NEGATIVE
NRBC # BLD: 0 K/UL (ref 0–0.2)
OTHER OBSERVATIONS: ABNORMAL
PH UR STRIP: 5.5 (ref 5–9)
PH UR: 5.5 (ref 5–9)
PLATELET # BLD AUTO: 302 K/UL (ref 150–450)
PMV BLD AUTO: 9.7 FL (ref 9.4–12.3)
POTASSIUM SERPL-SCNC: 4.6 MMOL/L (ref 3.5–5.1)
PROT SERPL-MCNC: 6.8 G/DL (ref 6.3–8.2)
PROT UR QL: >300 MG/DL
PROT UR STRIP-MCNC: 100 MG/DL
RBC # BLD AUTO: 5.6 M/UL (ref 4.23–5.6)
RBC # UR STRIP: ABNORMAL
RBC #/AREA URNS HPF: >100 /HPF
SERVICE CMNT-IMP: ABNORMAL
SODIUM SERPL-SCNC: 140 MMOL/L (ref 136–145)
SP GR UR REFRACTOMETRY: 1.03 (ref 1–1.02)
SP GR UR: >1.03 (ref 1–1.02)
UROBILINOGEN UR QL STRIP.AUTO: 0.2 EU/DL (ref 0.2–1)
UROBILINOGEN UR QL: 0.2 EU/DL (ref 0.2–1)
WBC # BLD AUTO: 12.5 K/UL (ref 4.3–11.1)
WBC URNS QL MICRO: ABNORMAL /HPF

## 2025-04-24 PROCEDURE — 99285 EMERGENCY DEPT VISIT HI MDM: CPT

## 2025-04-24 PROCEDURE — 6370000000 HC RX 637 (ALT 250 FOR IP): Performed by: EMERGENCY MEDICINE

## 2025-04-24 PROCEDURE — 80053 COMPREHEN METABOLIC PANEL: CPT

## 2025-04-24 PROCEDURE — 85025 COMPLETE CBC W/AUTO DIFF WBC: CPT

## 2025-04-24 PROCEDURE — 96374 THER/PROPH/DIAG INJ IV PUSH: CPT

## 2025-04-24 PROCEDURE — 6360000002 HC RX W HCPCS: Performed by: EMERGENCY MEDICINE

## 2025-04-24 PROCEDURE — 96375 TX/PRO/DX INJ NEW DRUG ADDON: CPT

## 2025-04-24 PROCEDURE — 87086 URINE CULTURE/COLONY COUNT: CPT

## 2025-04-24 PROCEDURE — 74176 CT ABD & PELVIS W/O CONTRAST: CPT

## 2025-04-24 PROCEDURE — 83690 ASSAY OF LIPASE: CPT

## 2025-04-24 PROCEDURE — 81001 URINALYSIS AUTO W/SCOPE: CPT

## 2025-04-24 PROCEDURE — 2500000003 HC RX 250 WO HCPCS: Performed by: EMERGENCY MEDICINE

## 2025-04-24 PROCEDURE — 81003 URINALYSIS AUTO W/O SCOPE: CPT

## 2025-04-24 RX ORDER — KETOROLAC TROMETHAMINE 30 MG/ML
30 INJECTION, SOLUTION INTRAMUSCULAR; INTRAVENOUS
Status: COMPLETED | OUTPATIENT
Start: 2025-04-24 | End: 2025-04-24

## 2025-04-24 RX ORDER — KETOROLAC TROMETHAMINE 10 MG/1
10 TABLET, FILM COATED ORAL EVERY 6 HOURS PRN
Qty: 20 TABLET | Refills: 0 | Status: SHIPPED | OUTPATIENT
Start: 2025-04-24 | End: 2025-04-29

## 2025-04-24 RX ORDER — ONDANSETRON 8 MG/1
8 TABLET, FILM COATED ORAL EVERY 8 HOURS PRN
Qty: 15 TABLET | Refills: 0 | Status: SHIPPED | OUTPATIENT
Start: 2025-04-24

## 2025-04-24 RX ORDER — TAMSULOSIN HYDROCHLORIDE 0.4 MG/1
0.4 CAPSULE ORAL
Status: COMPLETED | OUTPATIENT
Start: 2025-04-24 | End: 2025-04-24

## 2025-04-24 RX ORDER — ONDANSETRON 2 MG/ML
4 INJECTION INTRAMUSCULAR; INTRAVENOUS
Status: COMPLETED | OUTPATIENT
Start: 2025-04-24 | End: 2025-04-24

## 2025-04-24 RX ORDER — CEFPODOXIME PROXETIL 200 MG/1
200 TABLET, FILM COATED ORAL DAILY
Qty: 10 TABLET | Refills: 0 | Status: SHIPPED | OUTPATIENT
Start: 2025-04-24 | End: 2025-05-04

## 2025-04-24 RX ADMIN — TAMSULOSIN HYDROCHLORIDE 0.4 MG: 0.4 CAPSULE ORAL at 17:34

## 2025-04-24 RX ADMIN — WATER 1000 MG: 1 INJECTION INTRAMUSCULAR; INTRAVENOUS; SUBCUTANEOUS at 19:58

## 2025-04-24 RX ADMIN — KETOROLAC TROMETHAMINE 30 MG: 30 INJECTION, SOLUTION INTRAMUSCULAR at 17:34

## 2025-04-24 RX ADMIN — ONDANSETRON 4 MG: 2 INJECTION, SOLUTION INTRAMUSCULAR; INTRAVENOUS at 17:34

## 2025-04-24 ASSESSMENT — PAIN DESCRIPTION - DESCRIPTORS
DESCRIPTORS: ACHING
DESCRIPTORS: ACHING

## 2025-04-24 ASSESSMENT — ENCOUNTER SYMPTOMS: ABDOMINAL PAIN: 1

## 2025-04-24 ASSESSMENT — PAIN DESCRIPTION - ORIENTATION: ORIENTATION: LEFT

## 2025-04-24 ASSESSMENT — PAIN - FUNCTIONAL ASSESSMENT: PAIN_FUNCTIONAL_ASSESSMENT: 0-10

## 2025-04-24 ASSESSMENT — PAIN SCALES - GENERAL
PAINLEVEL_OUTOF10: 3
PAINLEVEL_OUTOF10: 3

## 2025-04-24 ASSESSMENT — PAIN DESCRIPTION - LOCATION
LOCATION: ABDOMEN;BACK
LOCATION: ABDOMEN

## 2025-04-24 NOTE — ED PROVIDER NOTES
Emergency Department Provider Note       PCP: Tevin Crawford Jr., MD   Age: 64 y.o.   Sex: male     DISPOSITION Discharge - Pending Orders Complete 04/24/2025 07:56:51 PM   DISPOSITION CONDITION Stable            ICD-10-CM    1. Renal colic  N23       2. Urinary tract infection with hematuria, site unspecified  N39.0     R31.9           Medical Decision Making     Patient has elevation of white blood cell count noted but no other clinically significant abnormalities on blood work.  Urinalysis has positive hematuria with small to moderate number of white blood cells and 2+ bacteria noted.  Patient otherwise not septic appearing and has no fever but will initiate treatment for UTI.  CT of the abdomen pelvis shows no acute intra-abdominal process.  The patient has multiple left renal stones with renal cyst.  My review is suggestive of a stone in the renal pelvis which may be causing intermittent obstruction.  However there is no obstruction noted at this time.  Patient is comfortable with minimal pain.  He is established with [facility], Dr. Haskins, will discharge and give a dose of Rocephin in the emergency department with prescription for antibiotic and analgesics and antiemetic.  Urine was cultured.     1 acute complicated illness or injury.  Prescription drug management performed.  Shared medical decision making was utilized in creating the patients health plan today.    I independently ordered and reviewed each unique test.                     History     Patient presents for evaluation with a complaint of left-sided flank and left-sided abdominal pain that woke him up at about 3 AM today.  The pain has been waxing waning intensity and when it becomes severe he gets nauseated but has had no vomiting.  He denies any fever or chills.  He reports a history of kidney stones also history of inflammatory bowel disease and cannot discern whether the pain is from 1 or the other at this time.  He  (H) 1.001 - 1.023      pH, Urine 5.5 5.0 - 9.0      Protein,  (A) NEG mg/dL    Glucose, Ur Negative NEG mg/dL    Ketones, Urine TRACE (A) NEG mg/dL    Bilirubin, Urine Negative NEG      Blood, Urine MODERATE (A) NEG      Urobilinogen, Urine 0.2 0.2 - 1.0 EU/dL    Nitrite, Urine Negative NEG      Leukocyte Esterase, Urine SMALL (A) NEG      WBC, UA 5-10 0 /hpf    RBC, UA >100 0 /hpf    Epithelial Cells, UA 0-3 0 /hpf    BACTERIA, URINE 2+ (H) 0 /hpf    Other observations RESULTS VERIFIED MANUALLY     POCT Urinalysis no Micro   Result Value Ref Range    Specific Gravity, Urine, POC >1.030 (H) 1.001 - 1.023    pH, Urine, POC 5.5 5.0 - 9.0      Protein, Urine, POC >300 (A) NEG mg/dL    Glucose, UA POC Negative NEG mg/dL    Ketones, Urine, POC 15 (A) NEG mg/dL    Bilirubin, Urine, POC SMALL (A) NEG      Blood, UA POC LARGE (A) NEG      URINE UROBILINOGEN POC 0.2 0.2 - 1.0 EU/dL    Nitrite, Urine, POC Negative NEG      Leukocyte Est, UA POC Negative NEG      Performed by: Juan Reed          CT ABDOMEN PELVIS RENAL STONE   Final Result   No obstructive uropathy.   Bilateral nonobstructive nephrolithiasis.   Bilateral indeterminate kidney lesions . Further characterization with renal   protocol CT or MRI is recommended.            ** If there are any questions about this report, I can be reached on   [employer]**      Electronically signed by Nuno Tolbert                   No results for input(s): \"COVID19\" in the last 72 hours.    Voice dictation software was used during the making of this note.  This software is not perfect and grammatical and other typographical errors may be present.  This note has not been completely proofread for errors.     Iván Balderas,   04/24/25 2002

## 2025-04-24 NOTE — ED TRIAGE NOTES
Pt ambulatory to ED w/ LLQ pain and L flank pain that started at 0300 today. Hx IBS, kidney stones, BPH. Pt endorses hematuria and intermittent dysuria. Pt A&O x 4

## 2025-04-26 LAB
BACTERIA SPEC CULT: NORMAL
SERVICE CMNT-IMP: NORMAL

## 2025-04-30 ENCOUNTER — TELEPHONE (OUTPATIENT)
Dept: UROLOGY | Age: 65
End: 2025-04-30

## 2025-04-30 ENCOUNTER — OFFICE VISIT (OUTPATIENT)
Dept: UROLOGY | Age: 65
End: 2025-04-30
Payer: COMMERCIAL

## 2025-04-30 DIAGNOSIS — N40.0 BENIGN PROSTATIC HYPERPLASIA, UNSPECIFIED WHETHER LOWER URINARY TRACT SYMPTOMS PRESENT: ICD-10-CM

## 2025-04-30 DIAGNOSIS — N39.0 RECURRENT UTI: ICD-10-CM

## 2025-04-30 DIAGNOSIS — N41.1 CHRONIC PROSTATITIS: ICD-10-CM

## 2025-04-30 DIAGNOSIS — N20.0 KIDNEY STONE: ICD-10-CM

## 2025-04-30 DIAGNOSIS — N20.0 KIDNEY STONES: Primary | ICD-10-CM

## 2025-04-30 LAB
BILIRUBIN, URINE, POC: NORMAL
BLOOD URINE, POC: NORMAL
GLUCOSE URINE, POC: NEGATIVE MG/DL
KETONES, URINE, POC: 15 MG/DL
LEUKOCYTE ESTERASE, URINE, POC: NEGATIVE
NITRITE, URINE, POC: NEGATIVE
PH, URINE, POC: 6 (ref 4.6–8)
PROTEIN,URINE, POC: 100 MG/DL
PSA SERPL-MCNC: 2.1 NG/ML (ref 0–4)
SPECIFIC GRAVITY, URINE, POC: 1.03 (ref 1–1.03)
URINALYSIS CLARITY, POC: NORMAL
URINALYSIS COLOR, POC: NORMAL
UROBILINOGEN, POC: NORMAL MG/DL

## 2025-04-30 PROCEDURE — 99214 OFFICE O/P EST MOD 30 MIN: CPT | Performed by: UROLOGY

## 2025-04-30 PROCEDURE — 81003 URINALYSIS AUTO W/O SCOPE: CPT | Performed by: UROLOGY

## 2025-04-30 NOTE — TELEPHONE ENCOUNTER
----- Message from Dr. Elpidio Haskins MD sent at 4/30/2025  3:51 PM EDT -----  Regarding: Surgery Scheduling  Hospital: [facility]    Surgeon: Jozef    Assist: NONE    Diagnosis: Left Kidney Stones    Procedure: Cystoscopy, LEFT Ureteroscopy, Laser Lithotripsy, LEFT Ureteral Stent Placement    Posting time: 1 hour    Special Instruments Needed:  NONE    Anesthesia: GENERAL    Labs: CBC, BMP, U/A    Tests: EKG per anesthesia    Blood: NONE    Bowel Prep: NONE    Special Instructions: need tube for anesthesia.  Need kan 100 watt laser    Orders/HandP:     Follow up appointment: 3 days with NP for stent removal.

## 2025-05-01 ENCOUNTER — RESULTS FOLLOW-UP (OUTPATIENT)
Dept: UROLOGY | Age: 65
End: 2025-05-01

## 2025-05-01 ASSESSMENT — ENCOUNTER SYMPTOMS
EYE DISCHARGE: 0
BLOOD IN STOOL: 0
BACK PAIN: 0
CONSTIPATION: 0
COUGH: 0
VOMITING: 0
INDIGESTION: 0
NAUSEA: 0
EYE PAIN: 0
ABDOMINAL PAIN: 0
DIARRHEA: 0
SKIN LESIONS: 0
WHEEZING: 0
HEARTBURN: 0
SHORTNESS OF BREATH: 0

## 2025-05-01 NOTE — PROGRESS NOTES
previous notes, test results and face to face with the patient as well as documenting.      Elpidio Haskins M.D.    [facility] [place] [address]  Phone: [phone]  Fax: [phone]

## 2025-05-05 PROBLEM — N20.0 KIDNEY STONE: Status: ACTIVE | Noted: 2025-04-30

## 2025-05-05 RX ORDER — BUSPIRONE HYDROCHLORIDE 5 MG/1
15 TABLET ORAL
COMMUNITY

## 2025-05-05 RX ORDER — NAPROXEN 500 MG/1
500 TABLET ORAL 2 TIMES DAILY PRN
COMMUNITY

## 2025-05-05 NOTE — PROGRESS NOTES
PLEASE CONTINUE TAKING ALL PRESCRIPTION MEDICATIONS UP TO THE DAY OF SURGERY UNLESS OTHERWISE DIRECTED BELOW. You may take Tylenol, allergy,  and/or indigestion medications.     TAKE ONLY THESE MEDICATIONS ON THE DAY OF SURGERY    Pepcid  and if needed ativan, zofran and toradol           DISCONTINUE all vitamins and supplements 7 days prior to surgery. DISCONTINUE Non-Steroidal Anti-Inflammatory (NSAIDS) such as Advil and Aleve 5 days prior to surgery.     Home Medications to Hold- please continue all other medications except these.    Diclofenac and naprosyn        Comments      On the day before surgery please take 2 Tylenol in the morning and then again before bed. You may use either regular or extra strength.           Please do not bring home medications with you on the day of surgery unless otherwise directed by your nurse.  If you are instructed to bring home medications, please give them to your nurse as they will be administered by the nursing staff.    If you have any questions, please call [facility] [phone].    A copy of this note was provided to the patient for reference.

## 2025-05-05 NOTE — TELEPHONE ENCOUNTER
CYSTOSCOPY LEFT URETEROSCOPY LASER LITHOTRIPSY LEFT URETERAL STENT PLACEMENT - NEED TUBE FOR ANESTHESIA   Date: 5/13/2025   Time: 1656

## 2025-05-05 NOTE — PROGRESS NOTES
Patient verified name and     Order for consent was not found in EHR and PT matchED case posting; patient verified.     Type 1B surgery, PHONE assessment complete.    Labs per surgeon: PENDING ORDERS  Labs per anesthesia protocol: POTASSIUM DOS-- ORDER PLACED IN EPIC  EKG: NONE        Patient provided with and instructed on educational handouts including Guide to Surgery, Preventing Surgical Site Infections, Pain Management, and Platter Anesthesia Brochure.    Patient answered medical/surgical history questions at their best of ability. All prior to admission medications documented in EPIC. Original medication prescription bottle was not visualized during patient appointment.     Patient instructed to hold all vitamins 7 days prior to surgery and NSAIDS 5 days prior to surgery, patient verbalized understanding.     Patient teach back successful and patient demonstrates knowledge of instructions.

## 2025-05-06 ENCOUNTER — PREP FOR PROCEDURE (OUTPATIENT)
Dept: UROLOGY | Age: 65
End: 2025-05-06

## 2025-05-06 DIAGNOSIS — N20.0 KIDNEY STONE ON LEFT SIDE: Primary | ICD-10-CM

## 2025-05-06 RX ORDER — SODIUM CHLORIDE 9 MG/ML
INJECTION, SOLUTION INTRAVENOUS PRN
Status: CANCELLED | OUTPATIENT
Start: 2025-05-06

## 2025-05-06 RX ORDER — SODIUM CHLORIDE 0.9 % (FLUSH) 0.9 %
5-40 SYRINGE (ML) INJECTION EVERY 12 HOURS SCHEDULED
Status: CANCELLED | OUTPATIENT
Start: 2025-05-06

## 2025-05-06 RX ORDER — SODIUM CHLORIDE 0.9 % (FLUSH) 0.9 %
5-40 SYRINGE (ML) INJECTION PRN
Status: CANCELLED | OUTPATIENT
Start: 2025-05-06

## 2025-05-12 ENCOUNTER — ANESTHESIA EVENT (OUTPATIENT)
Dept: SURGERY | Age: 65
End: 2025-05-12
Payer: COMMERCIAL

## 2025-05-13 ENCOUNTER — HOSPITAL ENCOUNTER (OUTPATIENT)
Age: 65
Setting detail: OUTPATIENT SURGERY
Discharge: HOME | End: 2025-05-13
Attending: UROLOGY | Admitting: UROLOGY
Payer: COMMERCIAL

## 2025-05-13 ENCOUNTER — ANESTHESIA (OUTPATIENT)
Dept: SURGERY | Age: 65
End: 2025-05-13
Payer: COMMERCIAL

## 2025-05-13 VITALS
BODY MASS INDEX: 28.97 KG/M2 | TEMPERATURE: 97.9 F | SYSTOLIC BLOOD PRESSURE: 160 MMHG | WEIGHT: 184.6 LBS | RESPIRATION RATE: 18 BRPM | OXYGEN SATURATION: 98 % | HEART RATE: 92 BPM | HEIGHT: 67 IN | DIASTOLIC BLOOD PRESSURE: 95 MMHG

## 2025-05-13 DIAGNOSIS — N20.0 KIDNEY STONE: Primary | ICD-10-CM

## 2025-05-13 LAB
APPEARANCE UR: CLEAR
BACTERIA URNS QL MICRO: 0 /HPF
BILIRUB UR QL: NEGATIVE
COLOR UR: ABNORMAL
EPI CELLS #/AREA URNS HPF: ABNORMAL /HPF
GLUCOSE BLD STRIP.AUTO-MCNC: 85 MG/DL (ref 65–100)
GLUCOSE BLD STRIP.AUTO-MCNC: 85 MG/DL (ref 65–100)
GLUCOSE UR STRIP.AUTO-MCNC: NEGATIVE MG/DL
HGB UR QL STRIP: ABNORMAL
KETONES UR QL STRIP.AUTO: NEGATIVE MG/DL
LEUKOCYTE ESTERASE UR QL STRIP.AUTO: NEGATIVE
NITRITE UR QL STRIP.AUTO: NEGATIVE
OTHER OBSERVATIONS: ABNORMAL
PH UR STRIP: 6 (ref 5–9)
POTASSIUM BLD-SCNC: 3.4 MMOL/L (ref 3.5–5.1)
PROT UR STRIP-MCNC: NEGATIVE MG/DL
RBC #/AREA URNS HPF: ABNORMAL /HPF
SERVICE CMNT-IMP: NORMAL
SERVICE CMNT-IMP: NORMAL
SP GR UR REFRACTOMETRY: 1.01 (ref 1–1.02)
UROBILINOGEN UR QL STRIP.AUTO: 0.2 EU/DL (ref 0.2–1)
WBC URNS QL MICRO: ABNORMAL /HPF

## 2025-05-13 PROCEDURE — 52351 CYSTOURETERO & OR PYELOSCOPE: CPT | Performed by: UROLOGY

## 2025-05-13 PROCEDURE — 2580000003 HC RX 258: Performed by: SURGERY

## 2025-05-13 PROCEDURE — 3700000000 HC ANESTHESIA ATTENDED CARE: Performed by: UROLOGY

## 2025-05-13 PROCEDURE — 7100000001 HC PACU RECOVERY - ADDTL 15 MIN: Performed by: UROLOGY

## 2025-05-13 PROCEDURE — 3600000004 HC SURGERY LEVEL 4 BASE: Performed by: UROLOGY

## 2025-05-13 PROCEDURE — C1769 GUIDE WIRE: HCPCS | Performed by: UROLOGY

## 2025-05-13 PROCEDURE — C2617 STENT, NON-COR, TEM W/O DEL: HCPCS | Performed by: UROLOGY

## 2025-05-13 PROCEDURE — 7100000000 HC PACU RECOVERY - FIRST 15 MIN: Performed by: UROLOGY

## 2025-05-13 PROCEDURE — 81001 URINALYSIS AUTO W/SCOPE: CPT

## 2025-05-13 PROCEDURE — 74420 UROGRAPHY RTRGR +-KUB: CPT | Performed by: UROLOGY

## 2025-05-13 PROCEDURE — 6360000004 HC RX CONTRAST MEDICATION: Performed by: UROLOGY

## 2025-05-13 PROCEDURE — 6360000002 HC RX W HCPCS: Performed by: UROLOGY

## 2025-05-13 PROCEDURE — C1747 HC ENDOSCOPE, SINGLE, URINARY TRACT: HCPCS | Performed by: UROLOGY

## 2025-05-13 PROCEDURE — 52332 CYSTOSCOPY AND TREATMENT: CPT | Performed by: UROLOGY

## 2025-05-13 PROCEDURE — 2709999900 HC NON-CHARGEABLE SUPPLY: Performed by: UROLOGY

## 2025-05-13 PROCEDURE — 84132 ASSAY OF SERUM POTASSIUM: CPT

## 2025-05-13 PROCEDURE — C1894 INTRO/SHEATH, NON-LASER: HCPCS | Performed by: UROLOGY

## 2025-05-13 PROCEDURE — 82962 GLUCOSE BLOOD TEST: CPT

## 2025-05-13 PROCEDURE — 6360000002 HC RX W HCPCS: Performed by: SURGERY

## 2025-05-13 PROCEDURE — 7100000011 HC PHASE II RECOVERY - ADDTL 15 MIN: Performed by: UROLOGY

## 2025-05-13 PROCEDURE — 3600000014 HC SURGERY LEVEL 4 ADDTL 15MIN: Performed by: UROLOGY

## 2025-05-13 PROCEDURE — 7100000010 HC PHASE II RECOVERY - FIRST 15 MIN: Performed by: UROLOGY

## 2025-05-13 PROCEDURE — 2720000010 HC SURG SUPPLY STERILE: Performed by: UROLOGY

## 2025-05-13 PROCEDURE — 2500000003 HC RX 250 WO HCPCS: Performed by: NURSE ANESTHETIST, CERTIFIED REGISTERED

## 2025-05-13 PROCEDURE — 6360000002 HC RX W HCPCS: Performed by: NURSE ANESTHETIST, CERTIFIED REGISTERED

## 2025-05-13 PROCEDURE — 3700000001 HC ADD 15 MINUTES (ANESTHESIA): Performed by: UROLOGY

## 2025-05-13 DEVICE — URETERAL STENT
Type: IMPLANTABLE DEVICE | Site: URETER | Status: FUNCTIONAL
Brand: PERCUFLEX™ PLUS

## 2025-05-13 RX ORDER — SULFAMETHOXAZOLE AND TRIMETHOPRIM 800; 160 MG/1; MG/1
1 TABLET ORAL 2 TIMES DAILY
Qty: 6 TABLET | Refills: 0 | Status: SHIPPED | OUTPATIENT
Start: 2025-05-13 | End: 2025-05-16

## 2025-05-13 RX ORDER — ONDANSETRON 2 MG/ML
INJECTION INTRAMUSCULAR; INTRAVENOUS
Status: DISCONTINUED | OUTPATIENT
Start: 2025-05-13 | End: 2025-05-13 | Stop reason: SDUPTHER

## 2025-05-13 RX ORDER — LIDOCAINE HYDROCHLORIDE 10 MG/ML
1 INJECTION, SOLUTION INFILTRATION; PERINEURAL
Status: DISCONTINUED | OUTPATIENT
Start: 2025-05-13 | End: 2025-05-13 | Stop reason: HOSPADM

## 2025-05-13 RX ORDER — OXYCODONE HYDROCHLORIDE 5 MG/1
5 TABLET ORAL
Status: DISCONTINUED | OUTPATIENT
Start: 2025-05-13 | End: 2025-05-13 | Stop reason: HOSPADM

## 2025-05-13 RX ORDER — PROPOFOL 10 MG/ML
INJECTION, EMULSION INTRAVENOUS
Status: DISCONTINUED | OUTPATIENT
Start: 2025-05-13 | End: 2025-05-13 | Stop reason: SDUPTHER

## 2025-05-13 RX ORDER — SODIUM CHLORIDE 0.9 % (FLUSH) 0.9 %
5-40 SYRINGE (ML) INJECTION EVERY 12 HOURS SCHEDULED
Status: DISCONTINUED | OUTPATIENT
Start: 2025-05-13 | End: 2025-05-13 | Stop reason: HOSPADM

## 2025-05-13 RX ORDER — LIDOCAINE HYDROCHLORIDE 20 MG/ML
INJECTION, SOLUTION EPIDURAL; INFILTRATION; INTRACAUDAL; PERINEURAL
Status: DISCONTINUED | OUTPATIENT
Start: 2025-05-13 | End: 2025-05-13 | Stop reason: SDUPTHER

## 2025-05-13 RX ORDER — SODIUM CHLORIDE 9 MG/ML
INJECTION, SOLUTION INTRAVENOUS PRN
Status: DISCONTINUED | OUTPATIENT
Start: 2025-05-13 | End: 2025-05-13 | Stop reason: HOSPADM

## 2025-05-13 RX ORDER — NEOSTIGMINE METHYLSULFATE 1 MG/ML
INJECTION, SOLUTION INTRAVENOUS
Status: DISCONTINUED | OUTPATIENT
Start: 2025-05-13 | End: 2025-05-13 | Stop reason: SDUPTHER

## 2025-05-13 RX ORDER — PHENYLEPHRINE HYDROCHLORIDE 10 MG/ML
INJECTION INTRAVENOUS
Status: DISCONTINUED | OUTPATIENT
Start: 2025-05-13 | End: 2025-05-13 | Stop reason: SDUPTHER

## 2025-05-13 RX ORDER — HALOPERIDOL 5 MG/ML
1 INJECTION INTRAMUSCULAR
Status: DISCONTINUED | OUTPATIENT
Start: 2025-05-13 | End: 2025-05-13 | Stop reason: HOSPADM

## 2025-05-13 RX ORDER — IPRATROPIUM BROMIDE AND ALBUTEROL SULFATE 2.5; .5 MG/3ML; MG/3ML
1 SOLUTION RESPIRATORY (INHALATION)
Status: DISCONTINUED | OUTPATIENT
Start: 2025-05-13 | End: 2025-05-13 | Stop reason: HOSPADM

## 2025-05-13 RX ORDER — LABETALOL HYDROCHLORIDE 5 MG/ML
10 INJECTION, SOLUTION INTRAVENOUS
Status: DISCONTINUED | OUTPATIENT
Start: 2025-05-13 | End: 2025-05-13 | Stop reason: HOSPADM

## 2025-05-13 RX ORDER — PROCHLORPERAZINE EDISYLATE 5 MG/ML
5 INJECTION INTRAMUSCULAR; INTRAVENOUS
Status: DISCONTINUED | OUTPATIENT
Start: 2025-05-13 | End: 2025-05-13 | Stop reason: HOSPADM

## 2025-05-13 RX ORDER — SODIUM CHLORIDE 0.9 % (FLUSH) 0.9 %
5-40 SYRINGE (ML) INJECTION PRN
Status: DISCONTINUED | OUTPATIENT
Start: 2025-05-13 | End: 2025-05-13 | Stop reason: HOSPADM

## 2025-05-13 RX ORDER — NALOXONE HYDROCHLORIDE 0.4 MG/ML
INJECTION, SOLUTION INTRAMUSCULAR; INTRAVENOUS; SUBCUTANEOUS PRN
Status: DISCONTINUED | OUTPATIENT
Start: 2025-05-13 | End: 2025-05-13 | Stop reason: HOSPADM

## 2025-05-13 RX ORDER — ROCURONIUM BROMIDE 10 MG/ML
INJECTION, SOLUTION INTRAVENOUS
Status: DISCONTINUED | OUTPATIENT
Start: 2025-05-13 | End: 2025-05-13 | Stop reason: SDUPTHER

## 2025-05-13 RX ORDER — ACETAMINOPHEN 500 MG
1000 TABLET ORAL ONCE
Status: DISCONTINUED | OUTPATIENT
Start: 2025-05-13 | End: 2025-05-13 | Stop reason: HOSPADM

## 2025-05-13 RX ORDER — SUCCINYLCHOLINE/SOD CL,ISO/PF 200MG/10ML
SYRINGE (ML) INTRAVENOUS
Status: DISCONTINUED | OUTPATIENT
Start: 2025-05-13 | End: 2025-05-13 | Stop reason: SDUPTHER

## 2025-05-13 RX ORDER — PHENAZOPYRIDINE HYDROCHLORIDE 200 MG/1
200 TABLET, FILM COATED ORAL 3 TIMES DAILY PRN
Qty: 6 TABLET | Refills: 0 | Status: SHIPPED | OUTPATIENT
Start: 2025-05-13 | End: 2025-05-19

## 2025-05-13 RX ORDER — SODIUM CHLORIDE, SODIUM LACTATE, POTASSIUM CHLORIDE, CALCIUM CHLORIDE 600; 310; 30; 20 MG/100ML; MG/100ML; MG/100ML; MG/100ML
INJECTION, SOLUTION INTRAVENOUS CONTINUOUS
Status: DISCONTINUED | OUTPATIENT
Start: 2025-05-13 | End: 2025-05-13 | Stop reason: HOSPADM

## 2025-05-13 RX ORDER — GLYCOPYRROLATE 0.2 MG/ML
INJECTION INTRAMUSCULAR; INTRAVENOUS
Status: DISCONTINUED | OUTPATIENT
Start: 2025-05-13 | End: 2025-05-13 | Stop reason: SDUPTHER

## 2025-05-13 RX ORDER — IOPAMIDOL 612 MG/ML
INJECTION, SOLUTION INTRAVASCULAR PRN
Status: DISCONTINUED | OUTPATIENT
Start: 2025-05-13 | End: 2025-05-13 | Stop reason: ALTCHOICE

## 2025-05-13 RX ORDER — FENTANYL CITRATE 50 UG/ML
INJECTION, SOLUTION INTRAMUSCULAR; INTRAVENOUS
Status: DISCONTINUED | OUTPATIENT
Start: 2025-05-13 | End: 2025-05-13 | Stop reason: SDUPTHER

## 2025-05-13 RX ORDER — DEXAMETHASONE SODIUM PHOSPHATE 4 MG/ML
INJECTION, SOLUTION INTRA-ARTICULAR; INTRALESIONAL; INTRAMUSCULAR; INTRAVENOUS; SOFT TISSUE
Status: DISCONTINUED | OUTPATIENT
Start: 2025-05-13 | End: 2025-05-13 | Stop reason: SDUPTHER

## 2025-05-13 RX ORDER — OXYCODONE AND ACETAMINOPHEN 5; 325 MG/1; MG/1
1 TABLET ORAL EVERY 6 HOURS PRN
Qty: 20 TABLET | Refills: 0 | Status: SHIPPED | OUTPATIENT
Start: 2025-05-13 | End: 2025-05-18

## 2025-05-13 RX ORDER — HYOSCYAMINE SULFATE 0.12 MG/1
0.12 TABLET SUBLINGUAL EVERY 4 HOURS PRN
Qty: 30 TABLET | Refills: 1 | Status: SHIPPED | OUTPATIENT
Start: 2025-05-13

## 2025-05-13 RX ADMIN — PROPOFOL 200 MG: 10 INJECTION, EMULSION INTRAVENOUS at 17:23

## 2025-05-13 RX ADMIN — HYDROMORPHONE HYDROCHLORIDE 0.5 MG: 1 INJECTION, SOLUTION INTRAMUSCULAR; INTRAVENOUS; SUBCUTANEOUS at 18:34

## 2025-05-13 RX ADMIN — LIDOCAINE HYDROCHLORIDE 100 MG: 20 INJECTION, SOLUTION EPIDURAL; INFILTRATION; INTRACAUDAL; PERINEURAL at 17:23

## 2025-05-13 RX ADMIN — Medication 2 G: at 17:30

## 2025-05-13 RX ADMIN — Medication 140 MG: at 17:23

## 2025-05-13 RX ADMIN — GLYCOPYRROLATE 0.6 MG: 0.2 INJECTION INTRAMUSCULAR; INTRAVENOUS at 17:54

## 2025-05-13 RX ADMIN — Medication 4 MG: at 17:54

## 2025-05-13 RX ADMIN — HYDROMORPHONE HYDROCHLORIDE 0.5 MG: 1 INJECTION, SOLUTION INTRAMUSCULAR; INTRAVENOUS; SUBCUTANEOUS at 18:13

## 2025-05-13 RX ADMIN — ONDANSETRON 4 MG: 2 INJECTION INTRAMUSCULAR; INTRAVENOUS at 17:30

## 2025-05-13 RX ADMIN — ROCURONIUM BROMIDE 10 MG: 10 INJECTION, SOLUTION INTRAVENOUS at 17:32

## 2025-05-13 RX ADMIN — ROCURONIUM BROMIDE 10 MG: 10 INJECTION, SOLUTION INTRAVENOUS at 17:23

## 2025-05-13 RX ADMIN — HYDROMORPHONE HYDROCHLORIDE 0.5 MG: 1 INJECTION, SOLUTION INTRAMUSCULAR; INTRAVENOUS; SUBCUTANEOUS at 18:18

## 2025-05-13 RX ADMIN — HYDROMORPHONE HYDROCHLORIDE 0.5 MG: 1 INJECTION, SOLUTION INTRAMUSCULAR; INTRAVENOUS; SUBCUTANEOUS at 18:25

## 2025-05-13 RX ADMIN — PHENYLEPHRINE HYDROCHLORIDE 100 MCG: 10 INJECTION INTRAVENOUS at 17:35

## 2025-05-13 RX ADMIN — SODIUM CHLORIDE, SODIUM LACTATE, POTASSIUM CHLORIDE, AND CALCIUM CHLORIDE: 600; 310; 30; 20 INJECTION, SOLUTION INTRAVENOUS at 17:12

## 2025-05-13 RX ADMIN — DEXAMETHASONE SODIUM PHOSPHATE 10 MG: 4 INJECTION, SOLUTION INTRAMUSCULAR; INTRAVENOUS at 17:30

## 2025-05-13 RX ADMIN — PHENYLEPHRINE HYDROCHLORIDE 100 MCG: 10 INJECTION INTRAVENOUS at 17:39

## 2025-05-13 RX ADMIN — FENTANYL CITRATE 50 MCG: 50 INJECTION, SOLUTION INTRAMUSCULAR; INTRAVENOUS at 17:22

## 2025-05-13 RX ADMIN — FENTANYL CITRATE 50 MCG: 50 INJECTION, SOLUTION INTRAMUSCULAR; INTRAVENOUS at 17:51

## 2025-05-13 ASSESSMENT — PAIN DESCRIPTION - LOCATION: LOCATION: PENIS

## 2025-05-13 ASSESSMENT — PAIN SCALES - GENERAL
PAINLEVEL_OUTOF10: 8
PAINLEVEL_OUTOF10: 5
PAINLEVEL_OUTOF10: 8
PAINLEVEL_OUTOF10: 7

## 2025-05-13 ASSESSMENT — PAIN DESCRIPTION - DESCRIPTORS: DESCRIPTORS: BURNING

## 2025-05-13 ASSESSMENT — PAIN - FUNCTIONAL ASSESSMENT: PAIN_FUNCTIONAL_ASSESSMENT: 0-10

## 2025-05-13 ASSESSMENT — ENCOUNTER SYMPTOMS: SHORTNESS OF BREATH: 1

## 2025-05-13 NOTE — BRIEF OP NOTE
Brief Postoperative Note      Patient: Juan J Trinh  YOB: 1960  MRN: [id number]    Date of Procedure: 5/13/2025    Pre-Op Diagnosis Codes:      * Kidney stone [N20.0]    Post-Op Diagnosis: Same       Procedure(s):  CYSTOSCOPY LEFT URETEROSCOPY LASER LITHOTRIPSY LEFT URETERAL STENT PLACEMENT, Left Retrograde Pyelogram, Intra-operative Interpretation of Fluoroscopy < 1 hour    Surgeon(s):  Elpidio Haskins MD    Assistant:  * No surgical staff found *    Anesthesia: General    Estimated Blood Loss (mL): Minimal    Complications: None    Specimens:   * No specimens in log *    Implants:  Implant Name Type Inv. Item Serial No.  Lot No. LRB No. Used Action   STENT URET 7FR L26CM PERCFLX + HYDR+ FIRM DUROMETER DBL - [id number]  STENT URET 7FR L26CM PERCFLX + HYDR+ FIRM DUROMETER DBL  [employer] UROLOGY-WD [id number] Left 1 Implanted         Drains: * No LDAs found *    Findings:  Infection Present At Time Of Surgery (PATOS) (choose all levels that have infection present):  No infection present  Other Findings: Tight proximal left ureter that would not allow passage of flexible or rigid ureteroscopes.      Left Retrograde Pyelogram Interpretation:   Filling defects in left lower pole of kidney consistent with kidney stone burden in this area.  Narrow proximal left ureter No obvious hydronephrosis.     Electronically signed by Elpidio Haskins MD on 5/13/2025 at 6:10 PM

## 2025-05-13 NOTE — OP NOTE
[address]  [address]                            OPERATIVE REPORT      PATIENT NAME: SHIVAM SCHOFIELD        : 1960  MED REC NO: [id number]                       ROOM: [facility] NO: [id number]                       ADMIT DATE: 2025  PROVIDER: Elpidio Haskins MD    DATE OF SERVICE:  2025    PREOPERATIVE DIAGNOSES:  Left kidney stones.    POSTOPERATIVE DIAGNOSES:  Left kidney stones.    PROCEDURES PERFORMED:  Cystoscopy, left ureteroscopy, diagnostic left retrograde pyelogram, left ureteral stent placement, intraoperative interpretation of fluoroscopy less than 1 hour.    SURGEON:  Elpidio Haskins MD    ASSISTANT:  None.    ANESTHESIA:  General.    ESTIMATED BLOOD LOSS:  Minimal.    SPECIMENS REMOVED:  None.    INTRAOPERATIVE FINDINGS:  No infection.  Other findings, a tight proximal left ureter that would not allow passage of the flexible or rigid ureteroscope.     COMPLICATIONS:  None.    IMPLANTS:  7 x 26 left ureter stent without strings.    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  He had a CT scan performed in the ER that showed the stable Bosniak II kidney cyst as well as left greater than right kidney stones, particularly a cluster of stones in the left lower pole of the kidney.  Given his pain, he opted to proceed with left-sided ureteroscopy today to try to treat the kidney stones on the left side as it was felt that they were moving and causing intermittent obstruction at the left UPJ and likely intermittent left flank pain.  After risk-benefit discussion was had, he opted to proceed.    DESCRIPTION OF PROCEDURE:  After informed consent was obtained,

## 2025-05-13 NOTE — ANESTHESIA PRE PROCEDURE
Department of Anesthesiology  Preprocedure Note       Name:  Juan J Trinh   Age:  64 y.o.  :  1960                                          MRN:  [id number]         Date:  2025      Surgeon: Surgeon(s):  Elpidio Haskins MD    Procedure: Procedure(s):  CYSTOSCOPY LEFT URETEROSCOPY LASER LITHOTRIPSY LEFT URETERAL STENT PLACEMENT    Medications prior to admission:   Prior to Admission medications    Medication Sig Start Date End Date Taking? Authorizing Provider   cariprazine hcl (VRAYLAR) 1.5 MG capsule Take 1 capsule by mouth nightly   Yes ProviderLincoln MD   busPIRone (BUSPAR) 5 MG tablet Take 3 tablets by mouth nightly   Yes Provider, MD Lincoln   diclofenac (VOLTAREN) 50 MG EC tablet Take 1 tablet by mouth daily as needed for Pain   Yes Provider, MD Lincoln   naproxen (NAPROSYN) 500 MG tablet Take 1 tablet by mouth 2 times daily as needed for Pain   Yes Provider, MD Lincoln   ondansetron (ZOFRAN) 8 MG tablet Take 1 tablet by mouth every 8 hours as needed for Nausea or Vomiting 25  Yes Iván Balderas DO   famotidine (PEPCID) 20 MG tablet Take 1 tablet by mouth as needed   Yes Provider, MD Lincoln   lamoTRIgine (LAMICTAL) 100 MG tablet Take 1.5 tablets by mouth nightly   Yes Provider, MD Lincoln   tamsulosin (FLOMAX) 0.4 MG capsule Take 1 capsule by mouth nightly 23  Yes Elipdio Haskins MD   LORazepam (ATIVAN) 1 MG tablet Take 1 tablet by mouth every 8 hours as needed. 19  Yes Automatic Reconciliation, Ar   ketorolac (TORADOL) 10 MG tablet Take 1 tablet by mouth every 6 hours as needed for Pain 25  Iván Balderas DO   EPINEPHrine (EPIPEN) 0.3 MG/0.3ML SOAJ injection as needed INJECT INTO OUTER THIGH FOR SEVERE ALLERGIC REACTION CALL 911 AFTER USE  Patient not taking: Reported on 2025   Automatic Reconciliation, Ar       Current medications:    Current Facility-Administered Medications   Medication Dose Route Frequency

## 2025-05-13 NOTE — DISCHARGE INSTRUCTIONS
If you have had surgery in the past 7-10 days by one of our providers and are having fever, bleeding, or drainage from an incision, have an opening in an incision, or having issues urinating properly, please call [phone] during normal office hours. Please call [phone] for any immediate needs AFTER HOURS.     Ureteral Stent Placement: What to Expect at Home  Your Recovery  A ureteral (say \"you-REE-ter-ul\") stent is a thin, hollow tube that is placed in the ureter to help urine pass from the kidney into the bladder. Ureters are the tubes that connect the kidneys to the bladder.  You may have a small amount of blood in your urine for 1 to 3 days after the procedure.  While the stent is in place, you may have to urinate more often, feel a sudden need to urinate, or feel like you cannot completely empty your bladder. You may feel some pain when you urinate or do strenuous activity. You also may notice a small amount of blood in your urine after strenuous activities. These side effects usually do not prevent people from doing their normal daily activities. You may have a string attached to your stent. Do not to pull the string unless the doctor tells you to. The doctor will use the string to pull out the stent when you no longer need it.  After the procedure, urine may flow better from your kidneys to your bladder. A ureteral stent may be left in place for several days or for as long as several months. Your doctor will take it out when you no longer need it.    Cystoscopy: What to Expect at Home  Your Recovery  A cystoscopy is a procedure that lets a doctor look inside of the bladder and the urethra. The urethra is the tube that carries urine from the bladder to outside the body. The doctor uses a thin, lighted tube called a cystoscope to look for kidney or bladder stones, tumors, bleeding, or infection. After the cystoscopy, your urethra may be sore at first, and it may burn when you urinate for the first few

## 2025-05-13 NOTE — H&P
Update History & Physical    The patient's History and Physical of 2025 was reviewed with the patient and I examined the patient. There was no change. The surgical site was confirmed by the patient and me.       Plan: The risks, benefits, expected outcome, and alternative to the recommended procedure have been discussed with the patient. Patient understands and wants to proceed with the procedure.     Electronically signed by Elpidio Haskins MD on 2025 at 4:40 PM    [address] [place] [id number]  [phone]     Juan J Trinh  : 1960         Chief Complaint   Patient presents with    Follow-up    Nephrolithiasis            HPI      Juan J Trinh is a 64 y.o. male     History of Present Illness  The patient is a 64-year-old male with a history of kidney stones and a Bosniak 2 kidney cyst that have been stable since , as well as BPH and prostatitis managed with Flomax. He presented to the emergency room with left flank pain on 2025. A CT scan showed left greater than right kidney stones likely causing intermittent obstruction and pain on the left side, but no stones in the ureter. His PSA in May 2024 was normal at 1.6 with his primary care doctor. Urine test today shows trace blood, no nitrites, no leukocytes. He reports urgency, frequency, intermittent hematuria, left flank and back pain, nausea, but no vomiting.     He experienced an episode of severe left-sided back and abdominal pain upon awakening on Wednesday night. Despite seeking initial care at an urgent care facility, he was advised to proceed to the emergency room due to the severity of his symptoms. A urine sample revealed a purple discoloration, indicative of hematuria. A subsequent CT scan confirmed the presence of renal calculi and a cyst. He has a long-standing history of renal and hepatic cysts. His pain is intermittent in nature. He has been managing his pain with ketorolac,

## 2025-05-14 ENCOUNTER — TELEPHONE (OUTPATIENT)
Dept: UROLOGY | Age: 65
End: 2025-05-14

## 2025-05-14 NOTE — ANESTHESIA POSTPROCEDURE EVALUATION
Department of Anesthesiology  Postprocedure Note    Patient: Juan J Trinh  MRN: [id number]  YOB: 1960  Date of evaluation: 5/13/2025    Procedure Summary       Date: 05/13/25 Room / Location: [facility] MAIN OR  [facility] MAIN OR    Anesthesia Start: 1712 Anesthesia Stop: 1808    Procedure: CYSTOSCOPY LEFT URETEROSCOPY LASER LITHOTRIPSY LEFT URETERAL STENT PLACEMENT (Left: Ureter) Diagnosis:       Kidney stone      (Kidney stone [N20.0])    Providers: Elpidio Haskins MD Responsible Provider: Luis Daniel Auguste MD    Anesthesia Type: General ASA Status: 2            Anesthesia Type: General    Mabel Phase I: Mabel Score: 10    Mabel Phase II: Mabel Score: 10    Anesthesia Post Evaluation    Patient location during evaluation: PACU  Patient participation: complete - patient participated  Level of consciousness: awake and alert  Airway patency: patent  Nausea & Vomiting: no nausea and no vomiting  Cardiovascular status: hemodynamically stable  Respiratory status: acceptable, nonlabored ventilation and spontaneous ventilation  Hydration status: euvolemic  Comments: BP (!) 160/95   Pulse 92   Temp 97.9 °F (36.6 °C) (Temporal)   Resp 18   Ht 1.702 m (5' 7\")   Wt 83.7 kg (184 lb 9.6 oz)   SpO2 98%   BMI 28.91 kg/m²     Multimodal analgesia pain management approach  Pain management: adequate and satisfactory to patient    No notable events documented.

## 2025-05-14 NOTE — TELEPHONE ENCOUNTER
----- Message from Dr. Elpidio Haskins MD sent at 5/13/2025  6:15 PM EDT -----  Regarding: Surgery Scheduling  Hospital: [facility]    Surgeon: Jozef    Assist: NONE    Diagnosis: Left Kidney Stones    Procedure: Cystoscopy, LEFT Ureteroscopy, Laser Lithotripsy, LEFT Ureteral Stent Exchange vs. Removal with Calyxo CVAC    Posting time: 90 minutes    Special Instruments Needed:  NONE    Anesthesia: GENERAL    Labs: CBC, BMP, U/A    Tests: EKG per anesthesia    Blood: NONE    Bowel Prep: NONE    Special Instructions: need to schedule at least 2 weeks from now to allow stent time to dilate ureter.  Need kan laser and calyxo rep for CVAC    Orders/HandP:     Follow up appointment: 3 days stent removal with NP

## 2025-05-19 ENCOUNTER — PREP FOR PROCEDURE (OUTPATIENT)
Dept: UROLOGY | Age: 65
End: 2025-05-19

## 2025-05-19 DIAGNOSIS — N20.0 CALCULUS OF LEFT KIDNEY: Primary | ICD-10-CM

## 2025-05-19 RX ORDER — OXYCODONE AND ACETAMINOPHEN 5; 325 MG/1; MG/1
1 TABLET ORAL EVERY 6 HOURS PRN
Status: ON HOLD | COMMUNITY
End: 2025-05-27

## 2025-05-19 RX ORDER — SODIUM CHLORIDE 9 MG/ML
INJECTION, SOLUTION INTRAVENOUS PRN
Status: CANCELLED | OUTPATIENT
Start: 2025-05-19

## 2025-05-19 RX ORDER — SODIUM CHLORIDE 0.9 % (FLUSH) 0.9 %
5-40 SYRINGE (ML) INJECTION EVERY 12 HOURS SCHEDULED
Status: CANCELLED | OUTPATIENT
Start: 2025-05-19

## 2025-05-19 RX ORDER — SODIUM CHLORIDE 0.9 % (FLUSH) 0.9 %
5-40 SYRINGE (ML) INJECTION PRN
Status: CANCELLED | OUTPATIENT
Start: 2025-05-19

## 2025-05-19 RX ORDER — BUSPIRONE HYDROCHLORIDE 7.5 MG/1
7.5-15 TABLET ORAL 2 TIMES DAILY
COMMUNITY

## 2025-05-21 ENCOUNTER — HOSPITAL ENCOUNTER (OUTPATIENT)
Dept: LAB | Age: 65
Discharge: HOME | End: 2025-05-24
Payer: COMMERCIAL

## 2025-05-21 DIAGNOSIS — N20.0 CALCULUS OF LEFT KIDNEY: ICD-10-CM

## 2025-05-21 LAB
ANION GAP SERPL CALC-SCNC: 12 MMOL/L (ref 7–16)
APPEARANCE UR: ABNORMAL
BACTERIA URNS QL MICRO: ABNORMAL /HPF
BILIRUB UR QL: ABNORMAL
BUN SERPL-MCNC: 16 MG/DL (ref 8–23)
CALCIUM SERPL-MCNC: 9.2 MG/DL (ref 8.8–10.2)
CHLORIDE SERPL-SCNC: 103 MMOL/L (ref 98–107)
CO2 SERPL-SCNC: 24 MMOL/L (ref 20–29)
COLOR UR: ABNORMAL
CREAT SERPL-MCNC: 1.3 MG/DL (ref 0.8–1.3)
EPI CELLS #/AREA URNS HPF: ABNORMAL /HPF
ERYTHROCYTE [DISTWIDTH] IN BLOOD BY AUTOMATED COUNT: 11.9 % (ref 11.9–14.6)
GLUCOSE SERPL-MCNC: 114 MG/DL (ref 70–99)
GLUCOSE UR STRIP.AUTO-MCNC: NEGATIVE MG/DL
HCT VFR BLD AUTO: 43.4 % (ref 41.1–50.3)
HGB BLD-MCNC: 14.2 G/DL (ref 13.6–17.2)
HGB UR QL STRIP: ABNORMAL
KETONES UR QL STRIP.AUTO: NEGATIVE MG/DL
LEUKOCYTE ESTERASE UR QL STRIP.AUTO: ABNORMAL
MCH RBC QN AUTO: 29.6 PG (ref 26.1–32.9)
MCHC RBC AUTO-ENTMCNC: 32.7 G/DL (ref 31.4–35)
MCV RBC AUTO: 90.4 FL (ref 82–102)
NITRITE UR QL STRIP.AUTO: NEGATIVE
NRBC # BLD: 0 K/UL (ref 0–0.2)
PH UR STRIP: 5.5 (ref 5–9)
PLATELET # BLD AUTO: 371 K/UL (ref 150–450)
PMV BLD AUTO: 9.6 FL (ref 9.4–12.3)
POTASSIUM SERPL-SCNC: 3.7 MMOL/L (ref 3.5–5.1)
PROT UR STRIP-MCNC: >300 MG/DL
RBC # BLD AUTO: 4.8 M/UL (ref 4.23–5.6)
RBC #/AREA URNS HPF: >100 /HPF
SODIUM SERPL-SCNC: 139 MMOL/L (ref 136–145)
SP GR UR REFRACTOMETRY: 1.03 (ref 1–1.02)
UROBILINOGEN UR QL STRIP.AUTO: 0.2 EU/DL (ref 0.2–1)
WBC # BLD AUTO: 16.1 K/UL (ref 4.3–11.1)
WBC URNS QL MICRO: >100 /HPF

## 2025-05-21 PROCEDURE — 85027 COMPLETE CBC AUTOMATED: CPT

## 2025-05-21 PROCEDURE — 36415 COLL VENOUS BLD VENIPUNCTURE: CPT

## 2025-05-21 PROCEDURE — 87086 URINE CULTURE/COLONY COUNT: CPT

## 2025-05-21 PROCEDURE — 81001 URINALYSIS AUTO W/SCOPE: CPT

## 2025-05-21 PROCEDURE — 80048 BASIC METABOLIC PNL TOTAL CA: CPT

## 2025-05-23 LAB
BACTERIA SPEC CULT: NORMAL
SERVICE CMNT-IMP: NORMAL

## 2025-05-27 ENCOUNTER — HOSPITAL ENCOUNTER (OUTPATIENT)
Age: 65
Setting detail: OUTPATIENT SURGERY
Discharge: HOME | End: 2025-05-27
Attending: UROLOGY | Admitting: UROLOGY
Payer: COMMERCIAL

## 2025-05-27 ENCOUNTER — ANESTHESIA (OUTPATIENT)
Dept: SURGERY | Age: 65
End: 2025-05-27
Payer: COMMERCIAL

## 2025-05-27 ENCOUNTER — ANESTHESIA EVENT (OUTPATIENT)
Dept: SURGERY | Age: 65
End: 2025-05-27
Payer: COMMERCIAL

## 2025-05-27 VITALS
RESPIRATION RATE: 18 BRPM | OXYGEN SATURATION: 91 % | HEIGHT: 67 IN | SYSTOLIC BLOOD PRESSURE: 127 MMHG | WEIGHT: 174.2 LBS | DIASTOLIC BLOOD PRESSURE: 84 MMHG | TEMPERATURE: 98.2 F | BODY MASS INDEX: 27.34 KG/M2 | HEART RATE: 99 BPM

## 2025-05-27 DIAGNOSIS — N20.0 KIDNEY STONE: Primary | ICD-10-CM

## 2025-05-27 LAB
APPEARANCE UR: ABNORMAL
BACTERIA URNS QL MICRO: ABNORMAL /HPF
BILIRUB UR QL: ABNORMAL
COLOR UR: YELLOW
EPI CELLS #/AREA URNS HPF: ABNORMAL /HPF
GLUCOSE UR STRIP.AUTO-MCNC: NEGATIVE MG/DL
HGB UR QL STRIP: ABNORMAL
KETONES UR QL STRIP.AUTO: 15 MG/DL
LEUKOCYTE ESTERASE UR QL STRIP.AUTO: ABNORMAL
MUCOUS THREADS URNS QL MICRO: ABNORMAL /LPF
NITRITE UR QL STRIP.AUTO: POSITIVE
OTHER OBSERVATIONS: ABNORMAL
PH UR STRIP: 6.5 (ref 5–9)
PROT UR STRIP-MCNC: ABNORMAL MG/DL
RBC #/AREA URNS HPF: >100 /HPF
SP GR UR REFRACTOMETRY: 1.02 (ref 1–1.02)
UROBILINOGEN UR QL STRIP.AUTO: 0.2 EU/DL (ref 0.2–1)
WBC URNS QL MICRO: >100 /HPF

## 2025-05-27 PROCEDURE — 6360000002 HC RX W HCPCS: Performed by: REGISTERED NURSE

## 2025-05-27 PROCEDURE — 3700000001 HC ADD 15 MINUTES (ANESTHESIA): Performed by: UROLOGY

## 2025-05-27 PROCEDURE — C2617 STENT, NON-COR, TEM W/O DEL: HCPCS | Performed by: UROLOGY

## 2025-05-27 PROCEDURE — 2720000010 HC SURG SUPPLY STERILE: Performed by: UROLOGY

## 2025-05-27 PROCEDURE — 6360000002 HC RX W HCPCS: Performed by: NURSE ANESTHETIST, CERTIFIED REGISTERED

## 2025-05-27 PROCEDURE — 2709999900 HC NON-CHARGEABLE SUPPLY: Performed by: UROLOGY

## 2025-05-27 PROCEDURE — 2500000003 HC RX 250 WO HCPCS: Performed by: NURSE ANESTHETIST, CERTIFIED REGISTERED

## 2025-05-27 PROCEDURE — 81003 URINALYSIS AUTO W/O SCOPE: CPT

## 2025-05-27 PROCEDURE — 6370000000 HC RX 637 (ALT 250 FOR IP): Performed by: ANESTHESIOLOGY

## 2025-05-27 PROCEDURE — 7100000010 HC PHASE II RECOVERY - FIRST 15 MIN: Performed by: UROLOGY

## 2025-05-27 PROCEDURE — 7100000001 HC PACU RECOVERY - ADDTL 15 MIN: Performed by: UROLOGY

## 2025-05-27 PROCEDURE — 3600000004 HC SURGERY LEVEL 4 BASE: Performed by: UROLOGY

## 2025-05-27 PROCEDURE — 3700000000 HC ANESTHESIA ATTENDED CARE: Performed by: UROLOGY

## 2025-05-27 PROCEDURE — 82355 CALCULUS ANALYSIS QUAL: CPT

## 2025-05-27 PROCEDURE — 7100000000 HC PACU RECOVERY - FIRST 15 MIN: Performed by: UROLOGY

## 2025-05-27 PROCEDURE — C1769 GUIDE WIRE: HCPCS | Performed by: UROLOGY

## 2025-05-27 PROCEDURE — 88300 SURGICAL PATH GROSS: CPT

## 2025-05-27 PROCEDURE — 6360000002 HC RX W HCPCS: Performed by: UROLOGY

## 2025-05-27 PROCEDURE — 7100000011 HC PHASE II RECOVERY - ADDTL 15 MIN: Performed by: UROLOGY

## 2025-05-27 PROCEDURE — 2500000003 HC RX 250 WO HCPCS: Performed by: REGISTERED NURSE

## 2025-05-27 PROCEDURE — 52356 CYSTO/URETERO W/LITHOTRIPSY: CPT | Performed by: UROLOGY

## 2025-05-27 PROCEDURE — 2580000003 HC RX 258: Performed by: ANESTHESIOLOGY

## 2025-05-27 PROCEDURE — 3600000014 HC SURGERY LEVEL 4 ADDTL 15MIN: Performed by: UROLOGY

## 2025-05-27 PROCEDURE — C1747 HC ENDOSCOPE, SINGLE, URINARY TRACT: HCPCS | Performed by: UROLOGY

## 2025-05-27 PROCEDURE — C1894 INTRO/SHEATH, NON-LASER: HCPCS | Performed by: UROLOGY

## 2025-05-27 DEVICE — URETERAL STENT
Type: IMPLANTABLE DEVICE | Status: FUNCTIONAL
Brand: PERCUFLEX™ PLUS

## 2025-05-27 RX ORDER — GLYCOPYRROLATE 0.2 MG/ML
INJECTION INTRAMUSCULAR; INTRAVENOUS
Status: DISCONTINUED | OUTPATIENT
Start: 2025-05-27 | End: 2025-05-27 | Stop reason: SDUPTHER

## 2025-05-27 RX ORDER — ACETAMINOPHEN 500 MG
1000 TABLET ORAL ONCE
Status: COMPLETED | OUTPATIENT
Start: 2025-05-27 | End: 2025-05-27

## 2025-05-27 RX ORDER — PROPOFOL 10 MG/ML
INJECTION, EMULSION INTRAVENOUS
Status: DISCONTINUED | OUTPATIENT
Start: 2025-05-27 | End: 2025-05-27 | Stop reason: SDUPTHER

## 2025-05-27 RX ORDER — OXYCODONE HYDROCHLORIDE 5 MG/1
5 TABLET ORAL PRN
Status: COMPLETED | OUTPATIENT
Start: 2025-05-27 | End: 2025-05-27

## 2025-05-27 RX ORDER — FENTANYL CITRATE 50 UG/ML
100 INJECTION, SOLUTION INTRAMUSCULAR; INTRAVENOUS
Status: DISCONTINUED | OUTPATIENT
Start: 2025-05-27 | End: 2025-05-27 | Stop reason: HOSPADM

## 2025-05-27 RX ORDER — SODIUM CHLORIDE 9 MG/ML
INJECTION, SOLUTION INTRAVENOUS PRN
Status: DISCONTINUED | OUTPATIENT
Start: 2025-05-27 | End: 2025-05-27 | Stop reason: HOSPADM

## 2025-05-27 RX ORDER — MIDAZOLAM HYDROCHLORIDE 2 MG/2ML
2 INJECTION, SOLUTION INTRAMUSCULAR; INTRAVENOUS
Status: DISCONTINUED | OUTPATIENT
Start: 2025-05-27 | End: 2025-05-27 | Stop reason: HOSPADM

## 2025-05-27 RX ORDER — SODIUM CHLORIDE, SODIUM LACTATE, POTASSIUM CHLORIDE, CALCIUM CHLORIDE 600; 310; 30; 20 MG/100ML; MG/100ML; MG/100ML; MG/100ML
INJECTION, SOLUTION INTRAVENOUS CONTINUOUS
Status: DISCONTINUED | OUTPATIENT
Start: 2025-05-27 | End: 2025-05-27 | Stop reason: HOSPADM

## 2025-05-27 RX ORDER — SULFAMETHOXAZOLE AND TRIMETHOPRIM 800; 160 MG/1; MG/1
1 TABLET ORAL 2 TIMES DAILY
Qty: 10 TABLET | Refills: 0 | Status: SHIPPED | OUTPATIENT
Start: 2025-05-27 | End: 2025-06-01

## 2025-05-27 RX ORDER — FAMOTIDINE 20 MG/1
20 TABLET, FILM COATED ORAL ONCE
Status: COMPLETED | OUTPATIENT
Start: 2025-05-27 | End: 2025-05-27

## 2025-05-27 RX ORDER — HYDROMORPHONE HYDROCHLORIDE 1 MG/ML
INJECTION, SOLUTION INTRAMUSCULAR; INTRAVENOUS; SUBCUTANEOUS
Status: DISCONTINUED | OUTPATIENT
Start: 2025-05-27 | End: 2025-05-27 | Stop reason: SDUPTHER

## 2025-05-27 RX ORDER — LIDOCAINE HYDROCHLORIDE 10 MG/ML
1 INJECTION, SOLUTION INFILTRATION; PERINEURAL
Status: DISCONTINUED | OUTPATIENT
Start: 2025-05-27 | End: 2025-05-27 | Stop reason: HOSPADM

## 2025-05-27 RX ORDER — NEOSTIGMINE METHYLSULFATE 1 MG/ML
INJECTION INTRAVENOUS
Status: DISCONTINUED | OUTPATIENT
Start: 2025-05-27 | End: 2025-05-27 | Stop reason: SDUPTHER

## 2025-05-27 RX ORDER — ONDANSETRON 2 MG/ML
4 INJECTION INTRAMUSCULAR; INTRAVENOUS
Status: DISCONTINUED | OUTPATIENT
Start: 2025-05-27 | End: 2025-05-27 | Stop reason: HOSPADM

## 2025-05-27 RX ORDER — NALOXONE HYDROCHLORIDE 0.4 MG/ML
INJECTION, SOLUTION INTRAMUSCULAR; INTRAVENOUS; SUBCUTANEOUS PRN
Status: DISCONTINUED | OUTPATIENT
Start: 2025-05-27 | End: 2025-05-27 | Stop reason: HOSPADM

## 2025-05-27 RX ORDER — DIPHENHYDRAMINE HYDROCHLORIDE 50 MG/ML
12.5 INJECTION, SOLUTION INTRAMUSCULAR; INTRAVENOUS
Status: DISCONTINUED | OUTPATIENT
Start: 2025-05-27 | End: 2025-05-27 | Stop reason: HOSPADM

## 2025-05-27 RX ORDER — HYOSCYAMINE SULFATE 0.12 MG/1
0.12 TABLET SUBLINGUAL EVERY 4 HOURS PRN
Qty: 30 TABLET | Refills: 1 | Status: SHIPPED | OUTPATIENT
Start: 2025-05-27

## 2025-05-27 RX ORDER — SODIUM CHLORIDE 9 MG/ML
INJECTION, SOLUTION INTRAVENOUS CONTINUOUS
Status: DISCONTINUED | OUTPATIENT
Start: 2025-05-27 | End: 2025-05-27 | Stop reason: HOSPADM

## 2025-05-27 RX ORDER — OXYCODONE AND ACETAMINOPHEN 5; 325 MG/1; MG/1
1 TABLET ORAL EVERY 6 HOURS PRN
Qty: 20 TABLET | Refills: 0 | Status: SHIPPED | OUTPATIENT
Start: 2025-05-27 | End: 2025-06-03

## 2025-05-27 RX ORDER — DEXAMETHASONE SODIUM PHOSPHATE 4 MG/ML
INJECTION, SOLUTION INTRA-ARTICULAR; INTRALESIONAL; INTRAMUSCULAR; INTRAVENOUS; SOFT TISSUE
Status: DISCONTINUED | OUTPATIENT
Start: 2025-05-27 | End: 2025-05-27 | Stop reason: SDUPTHER

## 2025-05-27 RX ORDER — LIDOCAINE HYDROCHLORIDE 20 MG/ML
INJECTION, SOLUTION EPIDURAL; INFILTRATION; INTRACAUDAL; PERINEURAL
Status: DISCONTINUED | OUTPATIENT
Start: 2025-05-27 | End: 2025-05-27 | Stop reason: SDUPTHER

## 2025-05-27 RX ORDER — SODIUM CHLORIDE 9 MG/ML
INJECTION, SOLUTION INTRAVENOUS PRN
Status: DISCONTINUED | OUTPATIENT
Start: 2025-05-27 | End: 2025-05-27 | Stop reason: SDUPTHER

## 2025-05-27 RX ORDER — SODIUM CHLORIDE 0.9 % (FLUSH) 0.9 %
5-40 SYRINGE (ML) INJECTION EVERY 12 HOURS SCHEDULED
Status: DISCONTINUED | OUTPATIENT
Start: 2025-05-27 | End: 2025-05-27 | Stop reason: HOSPADM

## 2025-05-27 RX ORDER — SODIUM CHLORIDE 0.9 % (FLUSH) 0.9 %
5-40 SYRINGE (ML) INJECTION PRN
Status: DISCONTINUED | OUTPATIENT
Start: 2025-05-27 | End: 2025-05-27 | Stop reason: HOSPADM

## 2025-05-27 RX ORDER — ONDANSETRON 2 MG/ML
INJECTION INTRAMUSCULAR; INTRAVENOUS
Status: DISCONTINUED | OUTPATIENT
Start: 2025-05-27 | End: 2025-05-27 | Stop reason: SDUPTHER

## 2025-05-27 RX ORDER — ROCURONIUM BROMIDE 10 MG/ML
INJECTION, SOLUTION INTRAVENOUS
Status: DISCONTINUED | OUTPATIENT
Start: 2025-05-27 | End: 2025-05-27 | Stop reason: SDUPTHER

## 2025-05-27 RX ORDER — OXYCODONE HYDROCHLORIDE 5 MG/1
10 TABLET ORAL PRN
Status: COMPLETED | OUTPATIENT
Start: 2025-05-27 | End: 2025-05-27

## 2025-05-27 RX ORDER — PHENAZOPYRIDINE HYDROCHLORIDE 200 MG/1
200 TABLET, FILM COATED ORAL 3 TIMES DAILY PRN
Qty: 18 TABLET | Refills: 0 | Status: SHIPPED | OUTPATIENT
Start: 2025-05-27 | End: 2025-06-02

## 2025-05-27 RX ADMIN — PHENYLEPHRINE HYDROCHLORIDE 100 MCG: 0.1 INJECTION, SOLUTION INTRAVENOUS at 13:13

## 2025-05-27 RX ADMIN — PROPOFOL 30 MG: 10 INJECTION, EMULSION INTRAVENOUS at 13:33

## 2025-05-27 RX ADMIN — DEXAMETHASONE SODIUM PHOSPHATE 4 MG: 4 INJECTION INTRA-ARTICULAR; INTRALESIONAL; INTRAMUSCULAR; INTRAVENOUS; SOFT TISSUE at 13:03

## 2025-05-27 RX ADMIN — OXYCODONE 10 MG: 5 TABLET ORAL at 15:36

## 2025-05-27 RX ADMIN — ROCURONIUM BROMIDE 10 MG: 10 INJECTION, SOLUTION INTRAVENOUS at 13:33

## 2025-05-27 RX ADMIN — SODIUM CHLORIDE, SODIUM LACTATE, POTASSIUM CHLORIDE, AND CALCIUM CHLORIDE: 600; 310; 30; 20 INJECTION, SOLUTION INTRAVENOUS at 12:34

## 2025-05-27 RX ADMIN — SODIUM CHLORIDE, SODIUM LACTATE, POTASSIUM CHLORIDE, AND CALCIUM CHLORIDE: 600; 310; 30; 20 INJECTION, SOLUTION INTRAVENOUS at 14:10

## 2025-05-27 RX ADMIN — ONDANSETRON 4 MG: 2 INJECTION, SOLUTION INTRAMUSCULAR; INTRAVENOUS at 14:37

## 2025-05-27 RX ADMIN — ACETAMINOPHEN 1000 MG: 500 TABLET, FILM COATED ORAL at 12:37

## 2025-05-27 RX ADMIN — PHENYLEPHRINE HYDROCHLORIDE 100 MCG: 0.1 INJECTION, SOLUTION INTRAVENOUS at 13:03

## 2025-05-27 RX ADMIN — ROCURONIUM BROMIDE 35 MG: 10 INJECTION, SOLUTION INTRAVENOUS at 12:59

## 2025-05-27 RX ADMIN — Medication 2000 MG: at 13:07

## 2025-05-27 RX ADMIN — GLYCOPYRROLATE 0.4 MG: 0.2 INJECTION INTRAMUSCULAR; INTRAVENOUS at 14:55

## 2025-05-27 RX ADMIN — FAMOTIDINE 20 MG: 20 TABLET, FILM COATED ORAL at 12:37

## 2025-05-27 RX ADMIN — FENTANYL CITRATE 50 MCG: 50 INJECTION INTRAMUSCULAR; INTRAVENOUS at 12:58

## 2025-05-27 RX ADMIN — PROPOFOL 20 MG: 10 INJECTION, EMULSION INTRAVENOUS at 14:05

## 2025-05-27 RX ADMIN — LIDOCAINE HYDROCHLORIDE 100 MG: 20 INJECTION, SOLUTION EPIDURAL; INFILTRATION; INTRACAUDAL; PERINEURAL at 12:58

## 2025-05-27 RX ADMIN — NEOSTIGMINE METHYLSULFATE 3.5 MG: 1 INJECTION INTRAVENOUS at 14:55

## 2025-05-27 RX ADMIN — HYDROMORPHONE HYDROCHLORIDE 1 MG: 1 INJECTION, SOLUTION INTRAMUSCULAR; INTRAVENOUS; SUBCUTANEOUS at 14:54

## 2025-05-27 RX ADMIN — PROPOFOL 170 MG: 10 INJECTION, EMULSION INTRAVENOUS at 12:58

## 2025-05-27 RX ADMIN — ROCURONIUM BROMIDE 5 MG: 10 INJECTION, SOLUTION INTRAVENOUS at 14:25

## 2025-05-27 RX ADMIN — FENTANYL CITRATE 25 MCG: 50 INJECTION INTRAMUSCULAR; INTRAVENOUS at 13:34

## 2025-05-27 RX ADMIN — FENTANYL CITRATE 25 MCG: 50 INJECTION INTRAMUSCULAR; INTRAVENOUS at 14:05

## 2025-05-27 RX ADMIN — PHENYLEPHRINE HYDROCHLORIDE 100 MCG: 0.1 INJECTION, SOLUTION INTRAVENOUS at 13:21

## 2025-05-27 ASSESSMENT — PAIN - FUNCTIONAL ASSESSMENT
PAIN_FUNCTIONAL_ASSESSMENT: ACTIVITIES ARE NOT PREVENTED
PAIN_FUNCTIONAL_ASSESSMENT: 0-10

## 2025-05-27 ASSESSMENT — PAIN DESCRIPTION - DESCRIPTORS
DESCRIPTORS: CRAMPING
DESCRIPTORS: SORE
DESCRIPTORS: ACHING

## 2025-05-27 ASSESSMENT — PAIN DESCRIPTION - ORIENTATION: ORIENTATION: ANTERIOR

## 2025-05-27 ASSESSMENT — PAIN DESCRIPTION - LOCATION: LOCATION: ABDOMEN

## 2025-05-27 ASSESSMENT — PAIN SCALES - GENERAL
PAINLEVEL_OUTOF10: 4
PAINLEVEL_OUTOF10: 1

## 2025-05-27 NOTE — BRIEF OP NOTE
Brief Postoperative Note      Patient: Juan J Trinh  YOB: 1960  MRN: [id number]    Date of Procedure: 5/27/2025    Pre-Op Diagnosis Codes:      * Kidney stones [N20.0]    Post-Op Diagnosis: Same       Procedure(s):  CYSTOSCOPY, LEFT URETEROSCOPY, LASER LITHOTRIPSY, LEFT URETERAL STENT EXCHANGE  WITH CALYXO CVAC    Surgeon(s):  Elpidio Haskins MD    Assistant:  * No surgical staff found *    Anesthesia: General    Estimated Blood Loss (mL): Minimal    Complications: None    Specimens:   ID Type Source Tests Collected by Time Destination   1 : Left Kidney Stone Stone (Calculus) Kidney STONE ANALYSIS Elpidio Haskins MD 5/27/2025 1426        Implants:  Implant Name Type Inv. Item Serial No.  Lot No. LRB No. Used Action   STENT URET 7FR L26CM PERCFLX + HYDR+ FIRM DUROMETER DBL - [id number]  STENT URET 7FR L26CM PERCFLX + HYDR+ FIRM DUROMETER DB  [id number] [facility] UROLOGY- [id number] Left 1 Implanted         Drains: * No LDAs found *    Findings:  Infection Present At Time Of Surgery (PATOS) (choose all levels that have infection present):  No infection present  Other Findings: 8 mm stone in proximal L ureter and multiple L lower pole kidney stones dusted and evacuated using CVAC evacuator.  No significant stone fragments remaining at end of case.     Electronically signed by Elpidio Haskins MD on 5/27/2025 at 3:01 PM

## 2025-05-27 NOTE — H&P
[facility] H&P Note                                           05/27/25     Patient: Juan J Trinh  MRN: [id number]    Admission Date:  5/27/2025, 0  Admission Diagnosis: Kidney stones [N20.0]  Reason for Consult: Kidney Stones     ASSESSMENT: 64 y.o. male with left sided kidney stones who presents for L URS/LL today.  Had stent placed 5/13/25.  Presents for interval treatment of stones.     PLAN:  I reviewed the risks/benefits/alternatives for stone treatment today in detail with the patient. Treatment options discussed include medical expulsive therapy (MET) vs. ESWL vs. Ureteroscopy/laser lithotropsy vs. PCNL.      After our discussion, the patient would like to proceed with LEFT Ureteroscopy/Laser Lithotripsy/Basket Extraction of Stone/Stent Exchange vs. Removal with calyxo CVAC.  Risks of ureteroscopy that we discussed were bleeding, infection, injury to the bladder/ureter/kidney and surrounding structures, incomplete fragmentation of stones, steinstrasse, inability to pass stone fragments requiring additional operative intervention in the form of second look ureteroscopy/laser lithotripsy and/or stent placement, ureteral stricture, stent migration, stent pain, urinary retention, risks of general anesthesia, recurrent stones.  The patient expressed understanding of the above.      -Consented  -Antibiotic on call to OR  -NPO for procedure.         __________________________________________________________________________________    HPI:     Juan J Trinh is a 64 y.o. male with left sided kidney stones who presents for L URS/LL today.  Had stent placed 5/13/25.  Presents for interval treatment of stones.     NO fever/chills.  U/A abnormal but this is expected with stent in place.     Past Medical History:  Past Medical History:   Diagnosis Date    Anxiety     Bipolar 1 disorder (HCC)     Chronic left maxillary sinusitis     Depression     Diabetes mellitus

## 2025-05-27 NOTE — ANESTHESIA PRE PROCEDURE
Department of Anesthesiology  Preprocedure Note       Name:  Juan J Trinh   Age:  64 y.o.  :  1960                                          MRN:  [id number]         Date:  2025      Surgeon: Surgeon(s):  Elpidio Haskins MD    Procedure: Procedure(s):  CYSTOSCOPY, LEFT URETEROSCOPY, LASER LITHOTRIPSY, LEFT URETERAL STENT EXCHANGE VERSUS REMOVAL WITH CALYXO CVAC    Medications prior to admission:   Prior to Admission medications    Medication Sig Start Date End Date Taking? Authorizing Provider   busPIRone (BUSPAR) 7.5 MG tablet Take 1-2 tablets by mouth 2 times daily 7.5 mg in morning 15 mg at bedtime   Yes ProviderLincoln MD   oxyCODONE-acetaminophen (PERCOCET) 5-325 MG per tablet Take 1 tablet by mouth every 6 hours as needed for Pain.   Yes ProviderLincoln MD   hyoscyamine (LEVSIN/SL) 0.125 MG sublingual tablet Place 1 tablet under the tongue every 4 hours as needed (bladder spasms) 25  Yes Elpidio Haskins MD   cariprazine hcl (VRAYLAR) 1.5 MG capsule Take 1 capsule by mouth nightly   Yes ProviderLincoln MD   diclofenac (VOLTAREN) 50 MG EC tablet Take 1 tablet by mouth daily as needed for Pain   Yes ProviderLincoln MD   naproxen (NAPROSYN) 500 MG tablet Take 1 tablet by mouth 2 times daily as needed for Pain   Yes Provider, MD Lincoln   ondansetron (ZOFRAN) 8 MG tablet Take 1 tablet by mouth every 8 hours as needed for Nausea or Vomiting 25  Yes Iván Balderas,    famotidine (PEPCID) 20 MG tablet Take 1 tablet by mouth as needed   Yes ProviderLincoln MD   lamoTRIgine (LAMICTAL) 100 MG tablet Take 1.5 tablets by mouth nightly   Yes Lincoln Hernandez MD   tamsulosin (FLOMAX) 0.4 MG capsule Take 1 capsule by mouth nightly 23  Yes Elpidio Haskins MD   LORazepam (ATIVAN) 1 MG tablet Take 1 tablet by mouth every 8 hours as needed for Anxiety. 19  Yes Automatic Reconciliation, Ar   EPINEPHrine (EPIPEN) 0.3 MG/0.3ML SOAJ injection as

## 2025-05-27 NOTE — DISCHARGE INSTRUCTIONS
If you have had surgery in the past 7-10 days by one of our providers and are having fever, bleeding, or drainage from an incision, have an opening in an incision, or having issues urinating properly, please call [phone] during normal office hours. Please call [phone] for any immediate needs AFTER HOURS.     Ureteral Stent Placement: What to Expect at Home  Your Recovery  A ureteral (say \"you-REE-ter-ul\") stent is a thin, hollow tube that is placed in the ureter to help urine pass from the kidney into the bladder. Ureters are the tubes that connect the kidneys to the bladder.  You may have a small amount of blood in your urine for 1 to 3 days after the procedure.  While the stent is in place, you may have to urinate more often, feel a sudden need to urinate, or feel like you cannot completely empty your bladder. You may feel some pain when you urinate or do strenuous activity. You also may notice a small amount of blood in your urine after strenuous activities. These side effects usually do not prevent people from doing their normal daily activities. You may have a string attached to your stent. Do not to pull the string unless the doctor tells you to. The doctor will use the string to pull out the stent when you no longer need it.  After the procedure, urine may flow better from your kidneys to your bladder. A ureteral stent may be left in place for several days or for as long as several months. Your doctor will take it out when you no longer need it.    Cystoscopy: What to Expect at Home  Your Recovery  A cystoscopy is a procedure that lets a doctor look inside of the bladder and the urethra. The urethra is the tube that carries urine from the bladder to outside the body. The doctor uses a thin, lighted tube called a cystoscope to look for kidney or bladder stones, tumors, bleeding, or infection. After the cystoscopy, your urethra may be sore at first, and it may burn when you urinate for the first few

## 2025-05-27 NOTE — OP NOTE
[address]  [address]                            OPERATIVE REPORT      PATIENT NAME: SHIVAM SCHOFIELD        : 1960  MED REC NO: [id number]                       ROOM: [facility] NO: [id number]                       ADMIT DATE: 2025  PROVIDER: Elpidio Haskins MD    DATE OF SERVICE:  2025    PREOPERATIVE DIAGNOSES:  Left kidney stones, left ureter stone.    POSTOPERATIVE DIAGNOSES:  Left kidney stones, left ureter stone.    PROCEDURES PERFORMED:  Cystoscopy, left ureteroscopy, laser lithotripsy, left ureteral stent exchange with Calyxo CVAC.    SURGEON:  Elpidio Haskins MD    ASSISTANT:  Surgical assistant, none.    ANESTHESIA:  General.    ESTIMATED BLOOD LOSS:  Minimal.    SPECIMENS REMOVED:  Left kidney stone for analysis.    INTRAOPERATIVE FINDINGS:  No infection.    Other findings:  An 8 mm left proximal ureteral stone and multiple left lower pole kidney stones dusted and evacuated using the CVAC evacuator.  No significant stone fragments remaining at the end of the case.     COMPLICATIONS:  None.    IMPLANTS:  7 x 26 left ureteral stent with strings.    INDICATIONS:  The patient is a 64-year-old gentleman with a history of kidney stones as well as BPH and recurrent urinary tract infections and prostatitis, who presented to see me on 2025, with left-sided back pain that awoke him from sleep.  He had a CT scan performed as part of the workup for the back pain that showed a left-sided renal calculi and a left proximal ureteral calculus.  He was counseled on management options, wanted to proceed with left-sided ureteroscopy for treatment of his left flank pain.  He had this attempted on May 13, 2025, but unfortunately his ureter was too edematous that the ureteroscope and laser were not able to pass up the left ureter to access the stone or kidney.  Therefore, a stent was left in place and he presents

## 2025-05-27 NOTE — PERIOP NOTE
Labs within anesthesia guidelines, no follow-up required. Labs automatically routed to ordering provider via Epic documentation.Abnormal UA and WBC reviewed by Dr. Haskins.    
Patient states he can arrive at noon. OR and Dr. Haskins aware of new arrival time.  
portable oxygen, rescue inhalers, remote for spinal cord stimulator or any electronic remote implant, and post-operative supplies such as cold therapy pad, sling, brace, shoe or boot as it applies to your case.      CLEAR LIQUID DIET    Things included in a clear liquid diet:     Water  Decaf Black Coffee (may have sugar but no milk or cream)  Decaf Tea  Any type soft drink  Apple, Cranberry, or Grape juice  Chicken bouillon or broth  Beef bouillon or broth  Popsicles  Jell-O (any flavor), NO solid fruit mixed in  Hard candy that is clear, such as lifesavers or lemon drops    Things NOT included in clear liquid:     Milk and milk products  Milkshakes  Any solid food  Any solid soup with solid ingredients such as noodles  Any creamed soup  Gum or Mints  Orange juice (or any other juice containing pulp)      Our Guide to Surgery with additional information can be found:  [email or web address]

## 2025-05-27 NOTE — ANESTHESIA POSTPROCEDURE EVALUATION
Department of Anesthesiology  Postprocedure Note    Patient: Juan J Trinh  MRN: [id number]  YOB: 1960  Date of evaluation: 5/27/2025    Procedure Summary       Date: 05/27/25 Room / Location: [facility] MAIN OR  [facility] MAIN OR    Anesthesia Start: 1249 Anesthesia Stop: 1512    Procedure: CYSTOSCOPY, LEFT URETEROSCOPY, LASER LITHOTRIPSY, LEFT URETERAL STENT EXCHANGE  WITH CALYXO CVAC (Left: Kidney) Diagnosis:       Kidney stones      (Kidney stones [N20.0])    Providers: Elpidio Haskins MD Responsible Provider: Caterina Martinez MD    Anesthesia Type: general ASA Status: 2            Anesthesia Type: No value filed.    Mabel Phase I: Mabel Score: 10    Mabel Phase II: Mabel Score: 10    Anesthesia Post Evaluation    Patient location during evaluation: PACU  Patient participation: complete - patient participated  Level of consciousness: awake and alert  Airway patency: patent  Nausea & Vomiting: no nausea  Cardiovascular status: hemodynamically stable  Respiratory status: acceptable  Hydration status: euvolemic  Pain management: adequate and satisfactory to patient        No notable events documented.

## 2025-06-02 ENCOUNTER — OFFICE VISIT (OUTPATIENT)
Dept: UROLOGY | Age: 65
End: 2025-06-02
Payer: COMMERCIAL

## 2025-06-02 DIAGNOSIS — N20.0 KIDNEY STONE: ICD-10-CM

## 2025-06-02 DIAGNOSIS — N20.0 KIDNEY STONE: Primary | ICD-10-CM

## 2025-06-02 DIAGNOSIS — N28.9 RENAL LESION: ICD-10-CM

## 2025-06-02 DIAGNOSIS — N23 RENAL COLIC: ICD-10-CM

## 2025-06-02 LAB
BILIRUBIN, URINE, POC: ABNORMAL
BLOOD URINE, POC: ABNORMAL
GLUCOSE URINE, POC: 100 MG/DL
KETONES, URINE, POC: 15 MG/DL
LEUKOCYTE ESTERASE, URINE, POC: ABNORMAL
NITRITE, URINE, POC: POSITIVE
PH, URINE, POC: 5 (ref 4.6–8)
PROTEIN,URINE, POC: 300 MG/DL
SPECIFIC GRAVITY, URINE, POC: 1.02 (ref 1–1.03)
URINALYSIS CLARITY, POC: ABNORMAL
URINALYSIS COLOR, POC: ABNORMAL
UROBILINOGEN, POC: ABNORMAL MG/DL

## 2025-06-02 PROCEDURE — 99214 OFFICE O/P EST MOD 30 MIN: CPT | Performed by: NURSE PRACTITIONER

## 2025-06-02 PROCEDURE — 81003 URINALYSIS AUTO W/O SCOPE: CPT | Performed by: NURSE PRACTITIONER

## 2025-06-02 ASSESSMENT — ENCOUNTER SYMPTOMS
VOMITING: 1
BACK PAIN: 0
NAUSEA: 1

## 2025-06-02 NOTE — PROGRESS NOTES
[facility]  [address] [address]  [phone]          Juan J Trinh  : 1960    Chief Complaint   Patient presents with    Follow-up          HPI     Juan J Trinh is a 64 y.o. male who is followed for kidney stone, BPH, and recurrent UTI/prostatitis.     CT a/p 25 showed no obstructive uropathy. Bilateral nonobstructive nephrolithiasis. Bilateral indeterminate kidney lesions . Further characterization with renal protocol CT or MRI is recommended. Of note, previous MÓNICA in  showed septated renal cysts unchanged since  MÓNICA.     Due to ongoing LEFT sided pain, he opted for left-sided ureteroscopy for treatment. He had this attempted on 25 but unfortunately his ureter was too edematous that the ureteroscope and laser were not able to pass up the left ureter to access the stone or kidney. A stent was left in place and he returned to the OR 25 for interval treatment of the stones using the CVAC evacuator. Found to have an 8 mm left proximal ureteral stone and multiple left lower pole kidney stones dusted and evacuated using the CVAC evacuator. Stone analysis is pending.    He is having normal irritative stent sx. Overall malaise w n/v. No fever.       Past Medical History:   Diagnosis Date    Anxiety     Bipolar 1 disorder (HCC)     Chronic left maxillary sinusitis     Depression     Diabetes mellitus (HCC)     pt denies-- no meds; pt denies again 25- no medication, states h/o hypoglycemia, symptomatic below 70    Dizziness     years of problem    Dyspnea 2015    none recent    Emphysema lung (HCC)     no inhalers    GERD (gastroesophageal reflux disease)     pepcid, well controlled, 1 pillow    Headache     experienced for years    Hearing loss     over past few years    Hypoglycemia     Kidney stone     Lung disease 2021    covid lung-- mild per pt-- no meds    Mixed hyperlipidemia 2015    Nosebleed

## 2025-06-04 ENCOUNTER — RESULTS FOLLOW-UP (OUTPATIENT)
Dept: UROLOGY | Age: 65
End: 2025-06-04

## 2025-06-04 LAB
BACTERIA SPEC CULT: NORMAL
SERVICE CMNT-IMP: NORMAL

## 2025-06-16 ENCOUNTER — TELEPHONE (OUTPATIENT)
Dept: UROLOGY | Age: 65
End: 2025-06-16

## 2025-06-16 NOTE — TELEPHONE ENCOUNTER
This can be side effect of tamsulosin. Stopped the medication and see if blood pressure improves. If it does not, need to contact PCP.    Velma Miles, DAVIE - CNP

## 2025-06-16 NOTE — TELEPHONE ENCOUNTER
Patient said his blood pressure has been 90/70 recently and he has read that in the side effects for Tamsulosin it could cause blood pressure problems.  He wants a call back.  I did ask him if he had called pcp yet.  He said no.

## 2025-06-24 ENCOUNTER — TELEPHONE (OUTPATIENT)
Dept: ENT CLINIC | Age: 65
End: 2025-06-24

## 2025-06-24 NOTE — TELEPHONE ENCOUNTER
Pts symptoms started 2-3 weeks ago. Went to urgent care and just finished doxycycline this past Friday ( 7 day course) .  No pain or fever. His Right ear stays stopped up. He had a cyst in his right ear in the past and is concerned it could have grown back. It will pop briefly and unstop but it returns stopped up mainly at night. Seen Dr. Gordon most recently but has seen Dr. Phoenix more time

## 2025-07-10 RX ORDER — CIPROFLOXACIN AND DEXAMETHASONE 3; 1 MG/ML; MG/ML
4 SUSPENSION/ DROPS AURICULAR (OTIC) DAILY
Qty: 7.5 ML | Refills: 0 | Status: SHIPPED | OUTPATIENT
Start: 2025-07-10 | End: 2025-07-15

## 2025-07-10 RX ORDER — CIPROFLOXACIN AND DEXAMETHASONE 3; 1 MG/ML; MG/ML
4 SUSPENSION/ DROPS AURICULAR (OTIC) 2 TIMES DAILY
Qty: 1 EACH | Refills: 0 | Status: SHIPPED | OUTPATIENT
Start: 2025-07-10 | End: 2025-07-15

## 2025-07-10 NOTE — TELEPHONE ENCOUNTER
I had a cancellation for next Monday so I went ahead ans scheduled this patient. I see Dr. Gordon mentioned sending in ciprodex drops but they have not been sent in yet.

## 2025-07-14 ENCOUNTER — OFFICE VISIT (OUTPATIENT)
Dept: ENT CLINIC | Age: 65
End: 2025-07-14

## 2025-07-14 VITALS
BODY MASS INDEX: 27.31 KG/M2 | HEART RATE: 101 BPM | WEIGHT: 174 LBS | RESPIRATION RATE: 18 BRPM | OXYGEN SATURATION: 97 % | HEIGHT: 67 IN

## 2025-07-14 DIAGNOSIS — H90.11 CONDUCTIVE HEARING LOSS OF RIGHT EAR WITH UNRESTRICTED HEARING OF LEFT EAR: ICD-10-CM

## 2025-07-14 DIAGNOSIS — Q18.1 CYST OF EAR CANAL: Primary | ICD-10-CM

## 2025-07-14 RX ORDER — OFLOXACIN 3 MG/ML
5 SOLUTION AURICULAR (OTIC) 2 TIMES DAILY
Qty: 2.5 ML | Refills: 0 | Status: SHIPPED | OUTPATIENT
Start: 2025-07-14 | End: 2025-07-19

## 2025-07-14 RX ORDER — LURASIDONE HYDROCHLORIDE 20 MG/1
TABLET, FILM COATED ORAL
COMMUNITY

## 2025-07-14 NOTE — PROGRESS NOTES
HPI:    Juan J Trinh is a 64 y.o. male seen Established   Chief Complaint   Patient presents with    Ear Problem     Patient presents today for recheck of R ear . Patient states that he has taken antibiotics but states that he opted to not take Cipo .        History of Present Illness  64-year-old male with recurrent right-sided otitis externa and ear canal cyst. Last evaluation 14 months ago. Recent oral antibiotic use, no topical drops. Required wick placement last year.    Urgent care visit 3-4 weeks ago for ear infection, prescribed doxycycline. Severe skin reactions from sunlight exposure, including hand and ankle swelling, and burn-like sensations on arms. Constant sensation of something lodged in ear, worse in mornings. Symptoms fluctuate. Reports mouth pain with hot food. No swimming. Using OTC earache drops.    Chronic sinus issues, sinus surgery by Dr. Proctor due to abscessed tooth perforating sinus. Since 05/2025, 2 kidney stone surgeries and 4 different antibiotics.    PAST SURGICAL HISTORY:  Sinus surgery due to abscessed tooth perforating sinus  Kidney stone surgeries (twice) in 05/2025        Past Medical History, Past Surgical History, Family history, Social History, and Medications were all reviewed with the patient today and updated as necessary.     Allergies   Allergen Reactions    Penicillin G Swelling     Facial swelling in childhood     Penicillins Swelling     Facial swelling in childhood    Facial swelling in childhood       Facial swelling in childhood    Iohexol Diarrhea and Nausea And Vomiting     5/3/21 pt states abd issues after CT scan, not allergic.     5/3/21 pt tolerated iv contrast for CT on this date without premedication and was fine. [facility]    Other reaction(s): Abdominal pain-I     Other reaction(s): Abdominal pain-I     5/3/21 pt states abd issues after CT scan, not allergic.      5/3/21 pt tolerated iv contrast for CT on this date without premedication and was fine.

## 2025-07-23 ENCOUNTER — OFFICE VISIT (OUTPATIENT)
Dept: ENT CLINIC | Age: 65
End: 2025-07-23
Payer: COMMERCIAL

## 2025-07-23 VITALS — RESPIRATION RATE: 98 BRPM | WEIGHT: 174 LBS | BODY MASS INDEX: 27.31 KG/M2 | HEART RATE: 74 BPM | HEIGHT: 67 IN

## 2025-07-23 DIAGNOSIS — Q18.1 CYST OF EAR CANAL: Primary | ICD-10-CM

## 2025-07-23 PROCEDURE — 99213 OFFICE O/P EST LOW 20 MIN: CPT | Performed by: STUDENT IN AN ORGANIZED HEALTH CARE EDUCATION/TRAINING PROGRAM

## (undated) DEVICE — URETERAL ACCESS SHEATH SET: Brand: NAVIGATOR HD

## (undated) DEVICE — Device

## (undated) DEVICE — MASTISOL ADHESIVE LIQ 2/3ML

## (undated) DEVICE — SOLUTION IRRIG 1000ML H2O PIC PLAS SHATTERPROOF CONTAINER

## (undated) DEVICE — BAG DRAIN UROLOGY GENESIS NS

## (undated) DEVICE — STRIP,CLOSURE,WOUND,MEDI-STRIP,1/2X4: Brand: MEDLINE

## (undated) DEVICE — GARMENT,MEDLINE,DVT,INT,CALF,MED, GEN2: Brand: MEDLINE

## (undated) DEVICE — NITINOL WIRE WITH HYDROPHILIC TIP: Brand: SENSOR

## (undated) DEVICE — SOLUTION IRRIG 3000ML H2O STRL BAG

## (undated) DEVICE — TURP TURB: Brand: MEDLINE INDUSTRIES, INC.

## (undated) DEVICE — STERILE LATEX POWDER FREE SURGICAL GLOVES WITH HYDROGEL COATING: Brand: PROTEXIS

## (undated) DEVICE — SINGLE-USE DIGITAL FLEXIBLE URETEROSCOPE: Brand: LITHOVUE

## (undated) DEVICE — CONTAINER,SPECIMEN,O.R.STRL,4.5OZ: Brand: MEDLINE

## (undated) DEVICE — URETEROSCOPE RIGID ASPIR SYS STRL DISP CVAC  MIN ORDER 2BX

## (undated) DEVICE — NITINOL STONE RETRIEVAL DEVICE: Brand: DAKOTA

## (undated) DEVICE — GLOVE SURG SZ 65 THK91MIL LTX FREE SYN POLYISOPRENE

## (undated) DEVICE — FIBER LASER 200UM 2J 80HZ 60W D F L FOR LITHO MOSES